# Patient Record
Sex: MALE | Race: WHITE | NOT HISPANIC OR LATINO | Employment: FULL TIME | URBAN - NONMETROPOLITAN AREA
[De-identification: names, ages, dates, MRNs, and addresses within clinical notes are randomized per-mention and may not be internally consistent; named-entity substitution may affect disease eponyms.]

---

## 2017-02-01 DIAGNOSIS — E11.9 TYPE 2 DIABETES MELLITUS WITHOUT COMPLICATION, UNSPECIFIED LONG TERM INSULIN USE STATUS: ICD-10-CM

## 2017-02-01 DIAGNOSIS — I10 BENIGN ESSENTIAL HYPERTENSION: Primary | ICD-10-CM

## 2017-02-02 RX ORDER — LISINOPRIL AND HYDROCHLOROTHIAZIDE 20; 25 MG/1; MG/1
TABLET ORAL
Qty: 60 TABLET | Refills: 0 | Status: SHIPPED | OUTPATIENT
Start: 2017-02-02 | End: 2017-03-21

## 2017-02-02 NOTE — TELEPHONE ENCOUNTER
Metformin 1000mg          Last Written Prescription Date: 07/18/2016  Last Fill Quantity: 180, # refills: 1  Last Office Visit with [facility], [facility] or [facility] prescribing provider:  01/27/2016        BP Readings from Last 3 Encounters:   01/27/16 120/80   01/08/16 138/77   12/18/15 132/82     MICROL       14   9/9/2015  No results found for this basename: microalbumin  CREATININE   Date Value Ref Range Status   01/05/2016 0.62* 0.66 - 1.25 mg/dL Final   ]  GFR ESTIMATE   Date Value Ref Range Status   01/05/2016 >90  Non  GFR Calc   >60 mL/min/1.7m2 Final   09/09/2015 >90  Non  GFR Calc   >60 mL/min/1.7m2 Final   03/24/2014 >90 >60 mL/min/1.7m2 Final     GFR ESTIMATE IF BLACK   Date Value Ref Range Status   01/05/2016 >90   GFR Calc   >60 mL/min/1.7m2 Final   09/09/2015 >90   GFR Calc   >60 mL/min/1.7m2 Final   03/24/2014 >90 >60 mL/min/1.7m2 Final     CHOL      191   9/9/2015  HDL       30   9/9/2015  LDL      111   9/9/2015  LDL      154   3/24/2014  TRIG      249   9/9/2015  CHOLHDLRATIO      6.4   9/9/2015  AST        9   9/9/2015  ALT       18   9/9/2015  A1C      7.4   1/6/2016  A1C      7.6   9/9/2015  A1C      7.7   3/16/2015  A1C      6.6   3/24/2014  A1C      6.8   8/12/2013  POTASSIUM   Date Value Ref Range Status   09/09/2015 3.9 3.4 - 5.3 mmol/L Final         LISINOPRIL-HYDROCHLOROTH 20-25 TABS  Last Written Prescription Date: 07/18/2016  Last Fill Quantity: 180, # refills: 1  Last Office Visit with [facility], [facility] or [facility] prescribing provider: 01/7/2016       POTASSIUM   Date Value Ref Range Status   09/09/2015 3.9 3.4 - 5.3 mmol/L Final     CREATININE   Date Value Ref Range Status   01/05/2016 0.62* 0.66 - 1.25 mg/dL Final     BP Readings from Last 3 Encounters:   01/27/16 120/80   01/08/16 138/77   12/18/15 132/82

## 2017-02-02 NOTE — TELEPHONE ENCOUNTER
Routing refill request to provider for review/approval because:  Labs out of range:  Creatine  Labs not current:  Potassium, creatinine, BP, GFR, LDL, Microalbumin, and A1C  Patient needs to be seen because it has been more than 1 year since last office visit.    Serenity Douglas RN  [facility]

## 2017-02-27 ENCOUNTER — TELEPHONE (OUTPATIENT)
Dept: FAMILY MEDICINE | Facility: OTHER | Age: 57
End: 2017-02-27

## 2017-02-27 DIAGNOSIS — E78.5 HYPERLIPIDEMIA LDL GOAL <100: ICD-10-CM

## 2017-02-27 DIAGNOSIS — E11.9 TYPE 2 DIABETES MELLITUS WITHOUT COMPLICATION, WITHOUT LONG-TERM CURRENT USE OF INSULIN (H): ICD-10-CM

## 2017-02-27 DIAGNOSIS — I10 BENIGN ESSENTIAL HYPERTENSION: Primary | ICD-10-CM

## 2017-02-27 DIAGNOSIS — I10 BENIGN ESSENTIAL HYPERTENSION: ICD-10-CM

## 2017-02-27 LAB
ALBUMIN SERPL-MCNC: 3.8 G/DL (ref 3.4–5)
ALP SERPL-CCNC: 84 U/L (ref 40–150)
ALT SERPL W P-5'-P-CCNC: 21 U/L (ref 0–70)
ANION GAP SERPL CALCULATED.3IONS-SCNC: 8 MMOL/L (ref 3–14)
AST SERPL W P-5'-P-CCNC: 14 U/L (ref 0–45)
BILIRUB SERPL-MCNC: 0.5 MG/DL (ref 0.2–1.3)
BUN SERPL-MCNC: 13 MG/DL (ref 7–30)
CALCIUM SERPL-MCNC: 8.8 MG/DL (ref 8.5–10.1)
CHLORIDE SERPL-SCNC: 102 MMOL/L (ref 94–109)
CO2 SERPL-SCNC: 31 MMOL/L (ref 20–32)
CREAT SERPL-MCNC: 0.75 MG/DL (ref 0.66–1.25)
CREAT UR-MCNC: 121 MG/DL
GFR SERPL CREATININE-BSD FRML MDRD: ABNORMAL ML/MIN/1.7M2
GLUCOSE SERPL-MCNC: 195 MG/DL (ref 70–99)
HBA1C MFR BLD: 9 % (ref 4.3–6)
LDLC SERPL DIRECT ASSAY-MCNC: 178 MG/DL
MICROALBUMIN UR-MCNC: 26 MG/L
MICROALBUMIN/CREAT UR: 21.49 MG/G CR (ref 0–17)
POTASSIUM SERPL-SCNC: 4 MMOL/L (ref 3.4–5.3)
PROT SERPL-MCNC: 7.2 G/DL (ref 6.8–8.8)
SODIUM SERPL-SCNC: 141 MMOL/L (ref 133–144)

## 2017-02-27 PROCEDURE — 83721 ASSAY OF BLOOD LIPOPROTEIN: CPT | Performed by: PHYSICIAN ASSISTANT

## 2017-02-27 PROCEDURE — 80053 COMPREHEN METABOLIC PANEL: CPT | Performed by: PHYSICIAN ASSISTANT

## 2017-02-27 PROCEDURE — 36415 COLL VENOUS BLD VENIPUNCTURE: CPT | Performed by: PHYSICIAN ASSISTANT

## 2017-02-27 PROCEDURE — 83036 HEMOGLOBIN GLYCOSYLATED A1C: CPT | Performed by: PHYSICIAN ASSISTANT

## 2017-02-27 PROCEDURE — 82043 UR ALBUMIN QUANTITATIVE: CPT | Performed by: PHYSICIAN ASSISTANT

## 2017-02-27 NOTE — TELEPHONE ENCOUNTER
Patient stopped in for his yearly blood work but there are no orders.  He says your name was on the letter he received and asks that you place the orders.  Please call when done and he will stop back.  [phone]

## 2017-02-27 NOTE — TELEPHONE ENCOUNTER
"Orders have been placed.  These should have come from Dr ALEJANDRE as I refilled while he was on \"vacation\".  He needs to follow-up with Dr ALEJANDRE soon.  Electronically signed:    Del Moise PA-C   "

## 2017-02-28 ENCOUNTER — TELEPHONE (OUTPATIENT)
Dept: FAMILY MEDICINE | Facility: OTHER | Age: 57
End: 2017-02-28

## 2017-02-28 NOTE — TELEPHONE ENCOUNTER
Patient has already been notified of this and had an appointment. Please disregard.   Kathy Becerra MA     2/28/2017

## 2017-02-28 NOTE — TELEPHONE ENCOUNTER
I left a call back message.  I was informing him of the following: Advise pt labs are not normal,needs office visit to discuss and arange treatment

## 2017-02-28 NOTE — LETTER
[address]   [address]  [phone]      2/28/2017  Fredy Clark   [address] [facility] [address]      Dear Fredy:      Lab results:    The results of your labs were abnormal. Please make an office visit to discuss and arange treatment . Enclosed is a copy of your current labs as well as your previous labs for comparison.  Please feel free to contact the clinic if you have any questions regarding your results.       Recommendations:  Please schedule an appointment with me.         Sincerely,         DOYLE Cannon M.D.  [address]   [address]  Tel. [phone]  Fax [phone]

## 2017-02-28 NOTE — TELEPHONE ENCOUNTER
Pt called back and we scheduled for 3/21 at 9am. If you want to see him sooner, please call him. Also pt would like a copy of his results mailed to him in the mail. Please send that to him.

## 2017-02-28 NOTE — TELEPHONE ENCOUNTER
I have attempted to contact this patient by phone with the following results: left message to return my call on answering machine.  Patient needs to schedule an office visit with Dr. Cannon to discuss lab results. Kathy Becerra MA     2/28/2017    Kathy Becerra MA     2/28/2017

## 2017-02-28 NOTE — TELEPHONE ENCOUNTER
----- Message from Lowell Cannon MD sent at 2/27/2017  4:33 PM CST -----  Advise pt labs are not normal,needs office visit to discuss and arange treatment

## 2017-03-21 ENCOUNTER — OFFICE VISIT (OUTPATIENT)
Dept: FAMILY MEDICINE | Facility: OTHER | Age: 57
End: 2017-03-21
Payer: COMMERCIAL

## 2017-03-21 VITALS
BODY MASS INDEX: 37.52 KG/M2 | RESPIRATION RATE: 20 BRPM | OXYGEN SATURATION: 95 % | DIASTOLIC BLOOD PRESSURE: 80 MMHG | TEMPERATURE: 97.4 F | HEART RATE: 89 BPM | HEIGHT: 72 IN | WEIGHT: 277 LBS | SYSTOLIC BLOOD PRESSURE: 134 MMHG

## 2017-03-21 DIAGNOSIS — E11.9 TYPE 2 DIABETES MELLITUS WITHOUT COMPLICATION, UNSPECIFIED LONG TERM INSULIN USE STATUS: ICD-10-CM

## 2017-03-21 DIAGNOSIS — E11.9 TYPE 2 DIABETES MELLITUS WITHOUT COMPLICATION, WITHOUT LONG-TERM CURRENT USE OF INSULIN (H): ICD-10-CM

## 2017-03-21 DIAGNOSIS — E78.5 HYPERLIPIDEMIA LDL GOAL <100: Primary | ICD-10-CM

## 2017-03-21 PROCEDURE — 99213 OFFICE O/P EST LOW 20 MIN: CPT | Performed by: FAMILY MEDICINE

## 2017-03-21 RX ORDER — GLIPIZIDE 10 MG/1
10 TABLET ORAL
Qty: 90 TABLET | Refills: 1 | Status: SHIPPED | OUTPATIENT
Start: 2017-03-21 | End: 2018-03-14

## 2017-03-21 ASSESSMENT — PAIN SCALES - GENERAL: PAINLEVEL: NO PAIN (0)

## 2017-03-21 NOTE — PROGRESS NOTES
SUBJECTIVE:                                                    Fredy Clark is a 56 year old male who presents to clinic today for the following health issues:      Diabetes Follow-up      Patient is checking blood sugars: not at all    Diabetic concerns: None     Symptoms of hypoglycemia (low blood sugar): none     Paresthesias (numbness or burning in feet) or sores: Yes has sore feet      Date of last diabetic eye exam: none      Hyperlipidemia Follow-Up      Rate your low fat/cholesterol diet?: not monitoring fat    Taking statin?  none    Other lipid medications/supplements?:  Fish oil/Omega 3,  without side effects     Hypertension Follow-up      Outpatient blood pressures are not being checked.    Low Salt Diet: not monitoring salt         Amount of exercise or physical activity: None    Problems taking medications regularly: adjusts on own    Medication side effects: none    Diet: regular (no restrictions)      Problem list and histories reviewed & adjusted, as indicated.    C: NEGATIVE for fever, chills, change in weight  E/M: NEGATIVE for ear, mouth and throat problems  R: NEGATIVE for significant cough or SOB  CV: NEGATIVE for chest pain, palpitations or peripheral edema    OBJECTIVE:                                                    /80 (BP Location: Left arm, Patient Position: Chair, Cuff Size: Adult Large)  Pulse 89  Temp 97.4  F (36.3  C) (Temporal)  Resp 20  Ht 6' (1.829 m)  Wt 277 lb (125.6 kg)  SpO2 95%  BMI 37.57 kg/m2  Body mass index is 37.57 kg/(m^2).    See dictated note     ASSESSMENT/PLAN:                                                        Lowell Cannon MD, MD  [facility]

## 2017-03-21 NOTE — PROGRESS NOTES
SUBJECTIVE:  Mr. Fredy Clark is a 56-year-old man, in to discuss recent labs several of which were abnormal.        He had labs drawn last month; came back with a markedly elevated A1c of 9; the previous one having been 7.4 and prior to that even better.  He has been on metformin 1000 mg twice daily for years.  He is on blood pressure medication and blood pressure is normal.  He does not smoke cigarettes.  He has had some lipid elevations in the past, was started on statins but developed aching on that and was intolerant of it so he has not been taking it.  He has been using some sort of herbal supplement ; he seems convinced that this has helped his diabetes and cholesterol.  I do not see where that would do much.      OBJECTIVE:     GENERAL:  On examination, a very talkative man, hard to keep him on subject.   I think he would really like to be in denial about his diabetes.  He is overweight and a bit florid.   HEART:  Regular.   LUNGS:  Clear.        ASSESSMENT AND PLAN:  He apparently does not have an Accu-Chek machine that is workable.  He has not had any hypoglycemic episodes.  I would not expect him to have on metformin.  We discuss this; I do note that he did not have a thyroid tested on his recent labs and we will draw that today.  His other labs were normal other than an elevated LDL.  He insists on repeating his lipids today and we will do so, although I expect they will remain elevated.  His option would probably be Zetia as he has been intolerant of statins.  I am going to have him seen by a diabetic nurse educator as he needs some education on his diabetes; needs an Accu-Chek machine and monitoring.        He is told I would like to see him back in 3 months.  We will repeat an A1c and lipids at that time.  We will not start the Zetia unless he agrees to it if lipids come back elevated, I do explain that the concern about his diabetes is for vascular damage.         LATONIA DEY M.D.              D: 2017 14:09   T: 2017 17:18   MT: [id number]      Name:     EVELYN CHILDRESS   MRN:      [id number]        Account:      [id number]   :      1960           Visit Date:   2017      Document: [id number]

## 2017-03-21 NOTE — MR AVS SNAPSHOT
After Visit Summary   3/21/2017    Fredy Clark    MRN: [id number]           Patient Information     Date Of Birth          1960        Visit Information        Provider Department      3/21/2017 9:00 AM Lowell Cannon MD [facility]        Today's Diagnoses     Hyperlipidemia LDL goal <100    -  1    Type 2 diabetes mellitus without complication, without long-term current use of insulin (H)        Type 2 diabetes mellitus without complication, unspecified long term insulin use status (H)           Follow-ups after your visit        Additional Services     DIABETES EDUCATOR REFERRAL       DIABETES SELF MANAGEMENT TRAINING (DSMT)      Your provider has referred you to Diabetes Education: [facility]: Diabetes Education - All [facility] [phone]   [email or web address]    Type of training and number of hours: Previous Diagnosis: Follow-up DSMT - 2 hours.    Medicare covers: 10 hours of initial DSMT in 12 month period from the time of first visit, plus 2 hours of follow-up DSMT annually, and additional hours as requested for insulin training.    Diabetes Type: Type 2 - On Oral Medication             Diabetes Co-Morbidities: none               A1C Goal:  <7.0       A1C is: Lab Results       Component                Value               Date                       A1C                      9.0                 02/27/2017              If an urgent visit is needed or A1C is above 12, Care Team to call the Diabetes Education Team at [phone] or send an In Basket message to the Diabetes Education Pool (P DIAB ED-PATIENT CARE).    Diabetes Education Topics: Comprehensive Knowledge Assessment and Instruction, Blood glucose meter instruction  and Medication Start: Oral Medication:     Special Educational Needs Requiring Individual DSMT: None       MEDICAL NUTRITION THERAPY (MNT) for Diabetes    Medical Nutrition Therapy with a  Registered Dietitian can be provided in coordination with Diabetes Self-Management Training to assist in achieving optimal diabetes management.    MNT Type and Hours: Previous diagnosis: Annual follow-up MNT - 2 hours                       Medicare will cover: 3 hours initial MNT in 12 month period after first visit, plus 2 hours of follow-up MNT annually    Please be aware that coverage of these services is subject to the terms and limitations of your health insurance plan.  Call member services at your health plan to determine Diabetes Self-Management Training benefits and ask which blood glucose monitor brands are covered by your plan.      Please bring the following with you to your appointment:    (1)  List of current medications   (2)  List of Blood Glucose Monitor brands that are covered by your insurance plan  (3)  Blood Glucose Monitor and log book  (4)   Food records for the 3 days prior to your visit    The Certified Diabetes Educator may make diabetes medication adjustments per the CDE Protocol and Collaborative Practice Agreement.                  Who to contact     If you have questions or need follow up information about today's clinic visit or your schedule please contact [facility] directly at [phone].  Normal or non-critical lab and imaging results will be communicated to you by Navdyhart, letter or phone within 4 business days after the clinic has received the results. If you do not hear from us within 7 days, please contact the clinic through [employer] or phone. If you have a critical or abnormal lab result, we will notify you by phone as soon as possible.  Submit refill requests through [employer] or call your pharmacy and they will forward the refill request to us. Please allow 3 business days for your refill to be completed.          Additional Information About Your Visit        [employer] Information     [employer] lets you send messages to your doctor, view your test results, renew  "your prescriptions, schedule appointments and more. To sign up, go to [email or web address] . Click on \"Log in\" on the left side of the screen, which will take you to the Welcome page. Then click on \"Sign up Now\" on the right side of the page.     You will be asked to enter the access code listed below, as well as some personal information. Please follow the directions to create your username and password.     Your access code is: [email or web address]  Expires: 2017 11:31 AM     Your access code will  in 90 days. If you need help or a new code, please call your [facility] clinic or [phone].        Care EveryWhere ID     This is your Care EveryWhere ID. This could be used by other organizations to access your [facility] medical records  [id number]        Your Vitals Were     Pulse Temperature Respirations Height Pulse Oximetry BMI (Body Mass Index)    89 97.4  F (36.3  C) (Temporal) 20 6' (1.829 m) 95% 37.57 kg/m2       Blood Pressure from Last 3 Encounters:   17 134/80   16 120/80   16 138/77    Weight from Last 3 Encounters:   17 277 lb (125.6 kg)   16 280 lb (127 kg)   16 274 lb (124.3 kg)              We Performed the Following     DIABETES EDUCATOR REFERRAL     LDL cholesterol direct     TSH with free T4 reflex          Today's Medication Changes          These changes are accurate as of: 3/21/17 11:31 AM.  If you have any questions, ask your nurse or doctor.               Start taking these medicines.        Dose/Directions    glipiZIDE 10 MG tablet   Commonly known as:  GLUCOTROL   Used for:  Type 2 diabetes mellitus without complication, unspecified long term insulin use status (H)   Started by:  Lowell Cannon MD        Dose:  10 mg   Take 1 tablet (10 mg) by mouth 2 times daily (before meals)   Quantity:  90 tablet   Refills:  1         These medicines have changed or have updated prescriptions.        Dose/Directions    metFORMIN 1000 MG tablet   Commonly " known as:  GLUCOPHAGE   This may have changed:  See the new instructions.   Used for:  Type 2 diabetes mellitus without complication, unspecified long term insulin use status (H)   Changed by:  Lowell Cannon MD        Dose:  1000 mg   Take 1 tablet (1,000 mg) by mouth 2 times daily (with meals)   Quantity:  180 tablet   Refills:  3            Where to get your medicines      These medications were sent to [facility] [address] - [place], [place] - [address] [facility]  [address] [facility] [address] [address]     Phone:  [phone]     glipiZIDE 10 MG tablet    metFORMIN 1000 MG tablet                Primary Care Provider Office Phone # Fax #    Lowell Cannon [phone]       [facility] [address] [facility] [address]        Thank you!     Thank you for choosing [facility]  for your care. Our goal is always to provide you with excellent care. Hearing back from our patients is one way we can continue to improve our services. Please take a few minutes to complete the written survey that you may receive in the mail after your visit with us. Thank you!             Your Updated Medication List - Protect others around you: Learn how to safely use, store and throw away your medicines at www.disposemymeds.org.          This list is accurate as of: 3/21/17 11:31 AM.  Always use your most recent med list.                   Brand Name Dispense Instructions for use    ascorbic acid 500 MG tablet    VITAMIN C    60    1 tab daily       aspirin  MG EC tablet      Take 1 tablet (325 mg) by mouth daily       * Cayenne 450 MG Caps      1 tab  bid  with [place]       * Cayenne 500 MG Caps      1 tab twice daily       glipiZIDE 10 MG tablet    GLUCOTROL    90 tablet    Take 1 tablet (10 mg) by mouth 2 times daily (before meals)       lisinopril-hydrochlorothiazide 20-25 MG per tablet    PRINZIDE/ZESTORETIC    180 tablet    Hold medication for the next three days.  On Monday 1/11/16,  begin taking 1 tablet by mouth daily.  On Friday 1/15/16, begin taking your normal 2 tablets by mouth daily.       metFORMIN 1000 MG tablet    GLUCOPHAGE    180 tablet    Take 1 tablet (1,000 mg) by mouth 2 times daily (with meals)       MULTIVITAMIN TABS   OR      1 tab daily       OMEGA-3 CAPS   OR      1200 mg.--1 tab bid       VITAMIN D (CHOLECALCIFEROL) PO      Take 1,000 Units by mouth daily       * Notice:  This list has 2 medication(s) that are the same as other medications prescribed for you. Read the directions carefully, and ask your doctor or other care provider to review them with you.

## 2017-05-14 DIAGNOSIS — I10 BENIGN ESSENTIAL HYPERTENSION: ICD-10-CM

## 2017-05-15 RX ORDER — LISINOPRIL AND HYDROCHLOROTHIAZIDE 20; 25 MG/1; MG/1
TABLET ORAL
Qty: 180 TABLET | Refills: 1 | Status: SHIPPED | OUTPATIENT
Start: 2017-05-15 | End: 2017-12-12

## 2017-05-15 NOTE — TELEPHONE ENCOUNTER
Lisinopril-hydrochlorothiazide (PRINZIDE, ZESTORETIC) 20-25 MG per tablet      Last Written Prescription Date: 01/08/2016  Last Fill Quantity: 180, # refills: 3  Last Office Visit with [facility], P or [facility] prescribing provider: 03/21/2017       Potassium   Date Value Ref Range Status   02/27/2017 4.0 3.4 - 5.3 mmol/L Final     Creatinine   Date Value Ref Range Status   02/27/2017 0.75 0.66 - 1.25 mg/dL Final     BP Readings from Last 3 Encounters:   03/21/17 134/80   01/27/16 120/80   01/08/16 138/77         Jennifer Maguire CMA

## 2017-05-15 NOTE — TELEPHONE ENCOUNTER
Routing refill request to provider for review/approval because:  A break in medication, patient should have been out of medication 4 months ago    Serenity Douglas RN  [facility]

## 2017-11-02 ENCOUNTER — TELEPHONE (OUTPATIENT)
Dept: FAMILY MEDICINE | Facility: OTHER | Age: 57
End: 2017-11-02

## 2017-11-02 NOTE — TELEPHONE ENCOUNTER
Panel Management Review      Patient has the following on his problem list:       Composite cancer screening  Chart review shows that this patient is due/due soon for the following   Summary:    Patient is due/failing the following:   tsh and diabetic office visit. and A1C    Action needed:   Patient needs office visit for diabetes.    Type of outreach:    Phone, left message for patient to call back.     Questions for provider review:    None                                                                                                                                    Cielo PAUL LPN       Chart routed to Cielo PAUL LPN   .

## 2017-11-06 DIAGNOSIS — E11.9 TYPE 2 DIABETES MELLITUS WITHOUT COMPLICATION, WITHOUT LONG-TERM CURRENT USE OF INSULIN (H): ICD-10-CM

## 2017-11-06 DIAGNOSIS — E78.5 HYPERLIPIDEMIA LDL GOAL <100: ICD-10-CM

## 2017-11-06 LAB
LDLC SERPL DIRECT ASSAY-MCNC: 154 MG/DL
TSH SERPL DL<=0.005 MIU/L-ACNC: 1.69 MU/L (ref 0.4–4)

## 2017-11-06 PROCEDURE — 36415 COLL VENOUS BLD VENIPUNCTURE: CPT | Performed by: FAMILY MEDICINE

## 2017-11-06 PROCEDURE — 83721 ASSAY OF BLOOD LIPOPROTEIN: CPT | Performed by: FAMILY MEDICINE

## 2017-11-06 PROCEDURE — 84443 ASSAY THYROID STIM HORMONE: CPT | Performed by: FAMILY MEDICINE

## 2017-11-20 ENCOUNTER — TELEPHONE (OUTPATIENT)
Dept: FAMILY MEDICINE | Facility: OTHER | Age: 57
End: 2017-11-20

## 2017-11-20 NOTE — TELEPHONE ENCOUNTER
"Patient called and is extremely upset that he was called and an A1C was not ordered or drawn at that time states \"well maybe I need to go elsewhere and they will do right and not charge me for a second draw.  Goodbye\"  And hung up on writer will forward to supervisor  "

## 2017-11-20 NOTE — TELEPHONE ENCOUNTER
Reason for Call:  Other regarding labs    Detailed comments: Nabil has questions regarding the lab results that were done on 11-6-17, please call.    Phone Number Patient can be reached at: Cell number on file:    Telephone Information:   Mobile [phone]       Best Time:     Can we leave a detailed message on this number? YES    Call taken on 11/20/2017 at 12:09 PM by María Roldan

## 2017-12-12 DIAGNOSIS — I10 BENIGN ESSENTIAL HYPERTENSION: ICD-10-CM

## 2017-12-12 NOTE — TELEPHONE ENCOUNTER
Requested Prescriptions   Pending Prescriptions Disp Refills     lisinopril-hydrochlorothiazide (PRINZIDE/ZESTORETIC) 20-25 MG per tablet [Pharmacy Med Name: LISINOPRIL-HYDROCHLOROTH 20-25 TABS] 180 tablet 1     Sig: TAKE TWO TABLETS BY MOUTH EVERY DAY    Diuretics (Including Combos) Protocol Passed    12/12/2017  2:52 AM       Passed - Blood pressure under 140/90    BP Readings from Last 3 Encounters:   03/21/17 134/80   01/27/16 120/80   01/08/16 138/77                Passed - Recent or future visit with authorizing provider's specialty    Patient had office visit in the last year or has a visit in the next 30 days with authorizing provider.  See chart review.              Passed - Patient is age 18 or older       Passed - Normal serum creatinine on file in past 12 months    Recent Labs   Lab Test  02/27/17   0937   CR  0.75             Passed - Normal serum potassium on file in past 12 months    Recent Labs   Lab Test  02/27/17   0937   POTASSIUM  4.0                   Passed - Normal serum sodium on file in past 12 months    Recent Labs   Lab Test  02/27/17   0937   NA  141

## 2017-12-13 RX ORDER — LISINOPRIL AND HYDROCHLOROTHIAZIDE 20; 25 MG/1; MG/1
TABLET ORAL
Qty: 180 TABLET | Refills: 1 | Status: SHIPPED | OUTPATIENT
Start: 2017-12-13 | End: 2018-12-17

## 2017-12-13 NOTE — TELEPHONE ENCOUNTER
Prescription approved per [facility] to the [facility] Refill Protocol.    Serenity Douglas RN  [facility]

## 2017-12-18 DIAGNOSIS — E11.9 TYPE 2 DIABETES MELLITUS WITHOUT COMPLICATION, WITHOUT LONG-TERM CURRENT USE OF INSULIN (H): Primary | ICD-10-CM

## 2017-12-18 LAB — HBA1C MFR BLD: 8.3 % (ref 4.3–6)

## 2017-12-18 PROCEDURE — 83036 HEMOGLOBIN GLYCOSYLATED A1C: CPT | Performed by: FAMILY MEDICINE

## 2017-12-18 PROCEDURE — 36415 COLL VENOUS BLD VENIPUNCTURE: CPT | Performed by: FAMILY MEDICINE

## 2018-02-20 ENCOUNTER — TELEPHONE (OUTPATIENT)
Dept: FAMILY MEDICINE | Facility: OTHER | Age: 58
End: 2018-02-20

## 2018-02-20 NOTE — TELEPHONE ENCOUNTER
Panel Management Review      Patient has the following on his problem list:       Composite cancer screening  Chart review shows that this patient is due/due soon for the following   Summary:    Patient is due/failing the following:   Diabetic office visit, labs and A1C    Action needed:   Patient needs office visit for diabetes.    Type of outreach:    Phone, left message for patient to call back.     Questions for provider review:    None                                                                                                                                    Cielo PAUL LPN       Chart routed to Cielo PAUL LPN   .

## 2018-03-14 ENCOUNTER — OFFICE VISIT (OUTPATIENT)
Dept: FAMILY MEDICINE | Facility: OTHER | Age: 58
End: 2018-03-14
Payer: COMMERCIAL

## 2018-03-14 ENCOUNTER — TELEPHONE (OUTPATIENT)
Dept: FAMILY MEDICINE | Facility: OTHER | Age: 58
End: 2018-03-14

## 2018-03-14 ENCOUNTER — RADIANT APPOINTMENT (OUTPATIENT)
Dept: GENERAL RADIOLOGY | Facility: OTHER | Age: 58
End: 2018-03-14
Attending: INTERNAL MEDICINE
Payer: COMMERCIAL

## 2018-03-14 VITALS
DIASTOLIC BLOOD PRESSURE: 70 MMHG | TEMPERATURE: 97.5 F | OXYGEN SATURATION: 91 % | BODY MASS INDEX: 35.13 KG/M2 | WEIGHT: 259 LBS | HEART RATE: 87 BPM | SYSTOLIC BLOOD PRESSURE: 110 MMHG

## 2018-03-14 DIAGNOSIS — H66.003 ACUTE SUPPURATIVE OTITIS MEDIA OF BOTH EARS WITHOUT SPONTANEOUS RUPTURE OF TYMPANIC MEMBRANES, RECURRENCE NOT SPECIFIED: ICD-10-CM

## 2018-03-14 DIAGNOSIS — R06.02 SOB (SHORTNESS OF BREATH): Primary | ICD-10-CM

## 2018-03-14 DIAGNOSIS — I25.10 ASCVD (ARTERIOSCLEROTIC CARDIOVASCULAR DISEASE): ICD-10-CM

## 2018-03-14 DIAGNOSIS — R07.9 ACUTE CHEST PAIN: ICD-10-CM

## 2018-03-14 DIAGNOSIS — E11.9 TYPE 2 DIABETES MELLITUS WITHOUT COMPLICATION, WITHOUT LONG-TERM CURRENT USE OF INSULIN (H): ICD-10-CM

## 2018-03-14 DIAGNOSIS — I10 BENIGN ESSENTIAL HYPERTENSION: ICD-10-CM

## 2018-03-14 DIAGNOSIS — R42 VERTIGO: ICD-10-CM

## 2018-03-14 DIAGNOSIS — R06.02 SOB (SHORTNESS OF BREATH): ICD-10-CM

## 2018-03-14 DIAGNOSIS — E78.5 HYPERLIPIDEMIA LDL GOAL <100: ICD-10-CM

## 2018-03-14 LAB
ALBUMIN SERPL-MCNC: 3.3 G/DL (ref 3.4–5)
ALP SERPL-CCNC: 63 U/L (ref 40–150)
ALT SERPL W P-5'-P-CCNC: 21 U/L (ref 0–70)
ANION GAP SERPL CALCULATED.3IONS-SCNC: 11 MMOL/L (ref 3–14)
AST SERPL W P-5'-P-CCNC: 26 U/L (ref 0–45)
BILIRUB SERPL-MCNC: 0.8 MG/DL (ref 0.2–1.3)
BUN SERPL-MCNC: 17 MG/DL (ref 7–30)
CALCIUM SERPL-MCNC: 8.5 MG/DL (ref 8.5–10.1)
CHLORIDE SERPL-SCNC: 95 MMOL/L (ref 94–109)
CO2 SERPL-SCNC: 28 MMOL/L (ref 20–32)
CREAT SERPL-MCNC: 0.76 MG/DL (ref 0.66–1.25)
ERYTHROCYTE [DISTWIDTH] IN BLOOD BY AUTOMATED COUNT: 12.2 % (ref 10–15)
GFR SERPL CREATININE-BSD FRML MDRD: >90 ML/MIN/1.7M2
GLUCOSE SERPL-MCNC: 168 MG/DL (ref 70–99)
HCT VFR BLD AUTO: 43.7 % (ref 40–53)
HGB BLD-MCNC: 15.5 G/DL (ref 13.3–17.7)
MCH RBC QN AUTO: 31.4 PG (ref 26.5–33)
MCHC RBC AUTO-ENTMCNC: 35.5 G/DL (ref 31.5–36.5)
MCV RBC AUTO: 89 FL (ref 78–100)
PLATELET # BLD AUTO: 196 10E9/L (ref 150–450)
POTASSIUM SERPL-SCNC: 3.7 MMOL/L (ref 3.4–5.3)
PROT SERPL-MCNC: 7.9 G/DL (ref 6.8–8.8)
RBC # BLD AUTO: 4.94 10E12/L (ref 4.4–5.9)
SODIUM SERPL-SCNC: 134 MMOL/L (ref 133–144)
TROPONIN I SERPL-MCNC: <0.015 UG/L (ref 0–0.04)
WBC # BLD AUTO: 6.3 10E9/L (ref 4–11)

## 2018-03-14 PROCEDURE — 71046 X-RAY EXAM CHEST 2 VIEWS: CPT | Mod: FY

## 2018-03-14 PROCEDURE — 36415 COLL VENOUS BLD VENIPUNCTURE: CPT | Performed by: INTERNAL MEDICINE

## 2018-03-14 PROCEDURE — 85027 COMPLETE CBC AUTOMATED: CPT | Performed by: INTERNAL MEDICINE

## 2018-03-14 PROCEDURE — 84484 ASSAY OF TROPONIN QUANT: CPT | Performed by: INTERNAL MEDICINE

## 2018-03-14 PROCEDURE — 80053 COMPREHEN METABOLIC PANEL: CPT | Performed by: INTERNAL MEDICINE

## 2018-03-14 PROCEDURE — 93000 ELECTROCARDIOGRAM COMPLETE: CPT | Performed by: INTERNAL MEDICINE

## 2018-03-14 PROCEDURE — 99214 OFFICE O/P EST MOD 30 MIN: CPT | Performed by: INTERNAL MEDICINE

## 2018-03-14 RX ORDER — MECLIZINE HYDROCHLORIDE 25 MG/1
25 TABLET ORAL EVERY 6 HOURS PRN
Qty: 30 TABLET | Refills: 0 | Status: SHIPPED | OUTPATIENT
Start: 2018-03-14 | End: 2018-12-17

## 2018-03-14 RX ORDER — AZITHROMYCIN 250 MG/1
TABLET, FILM COATED ORAL
Qty: 6 TABLET | Refills: 0 | Status: SHIPPED | OUTPATIENT
Start: 2018-03-14 | End: 2018-12-17

## 2018-03-14 NOTE — TELEPHONE ENCOUNTER
Fredy Clark is a 57 year old male who calls with concerns of dizziness.  Patient reports that symptoms started last Thursday.  Symptoms have slightly improved since that time.  Patient reports dizziness mostly with position changes, but reports it takes a while for dizziness to subside.  He reports some mild cold symptoms, non productive cough and runny nose that has been improving.  He reports a decrease in appetite, but is still getting fluids.  Nauseated but no vomiting.  No bowel or bladder concerns.  He reports some very mild burred vision that comes with the dizziness.  He denies any ringing in the ears or pain.  Denies fevers.  No new medications.  Patient is diabetic, when asked about blood sugars, he was not checking.  Patient is requesting a appointment.     NURSING ASSESSMENT:  Description:  Dizziness.   Onset/duration:  Last Thursday.   Associated symptoms:  Blurred vision, mild cold symptoms.   Improves/worsens symptoms:  Slight improvement.   Pain scale (0-10)   Denies pain.  Last exam/Treatment:  03/21/2017  Allergies:   Allergies   Allergen Reactions     No Known Drug Allergies      NURSING PLAN: Routed to provider Yes    RECOMMENDED DISPOSITION:  See in 24 hours - Patient encouraged to continue with fluids and eating small meals often, slow position changes.  Educated when to seek emergent care but reports the just wants to come to the clinic.   Will comply with recommendation: Unknown.   If further questions/concerns or if symptoms do not improve, worsen or new symptoms develop, call your PCP or [facility] as soon as possible.    Guideline used:  Dizziness.   Telephone Triage Protocols for Nurses, Fifth Edition, Emy Vieyra RN

## 2018-03-14 NOTE — NURSING NOTE
Chief Complaint   Patient presents with     Dizziness       Initial /70 (BP Location: Left arm, Patient Position: Chair, Cuff Size: Adult Regular)  Pulse 87  Temp 97.5  F (36.4  C) (Tympanic)  Wt 259 lb (117.5 kg)  SpO2 91%  BMI 35.13 kg/m2 Estimated body mass index is 35.13 kg/(m^2) as calculated from the following:    Height as of 3/21/17: 6' (1.829 m).    Weight as of this encounter: 259 lb (117.5 kg).  Medication Reconciliation: complete  Jackie SALVADOR

## 2018-03-14 NOTE — MR AVS SNAPSHOT
After Visit Summary   3/14/2018    Fredy Clark    MRN: [id number]           Patient Information     Date Of Birth          1960        Visit Information        Provider Department      3/14/2018 2:00 PM Raymond Sierra DO [facility]        Today's Diagnoses     SOB (shortness of breath)    -  1    ASCVD (arteriosclerotic cardiovascular disease)        Hyperlipidemia LDL goal <100        Type 2 diabetes mellitus without complication, without long-term current use of insulin (H)        Benign essential hypertension        Acute chest pain        Vertigo        Acute suppurative otitis media of both ears without spontaneous rupture of tympanic membranes, recurrence not specified           Follow-ups after your visit        Your next 10 appointments already scheduled     Mar 20, 2018  7:30 AM CDT   (Arrive by 7:15 AM)   NM MPI ADENOSINE with PHNM1, NL STRESS TEST, PMC RM1   [facility] ([facility])    [address] [place] [id number] [phone]           For a ONE day exam: Allow 3-4 hours for test. For a TWO day exam: Allow  minutes PER day for test.  On the day of your resting scan: Please stop eating 3 hours before the test. You may drink water or juice.  On the day of your stress test: Stop all caffeine 12 hours before the test. This includes coffee, tea, soda pop, chocolate and certain medicines (such as Anacin, Excedrin and NoDoz). Also avoid decaf coffee and tea, as these contain small amounts of caffeine.  Stop eating 3 hours before the test. You may drink water.  You may need to stop some medicines before the test. Follow your doctor s orders. - If you take a beta blocker: Do not take your beta-blocker on the day before your test, unless specifically told to by your doctor. And do not take it on the day of your test. Bring it with you to take after the test. - If you take Aggrenox or dipyridamole  "(Persantine, Permole), stop taking it 48 hours before your test. - If you take Viagra, Cialis or Levitra, stop taking it 48 hours before your test. - If you take theophylline or aminophylline, stop taking it 12 hours before your test.  Do not take nitrates on the day of your test. Do not wear your Nitro-Patch.  Please wear a loose two-piece outfit. If you will have an exercise test, bring rubber-soled walking shoes.  When you arrive, please tell us if you have a pacemaker or ICD (implantable defibrillator).  Please call your Imaging Department at your exam site with any questions.              Future tests that were ordered for you today     Open Future Orders        Priority Expected Expires Ordered    NM Exercise stress test Routine  3/14/2019 3/14/2018            Who to contact     If you have questions or need follow up information about today's clinic visit or your schedule please contact [facility] directly at [phone].  Normal or non-critical lab and imaging results will be communicated to you by [email or web address], letter or phone within 4 business days after the clinic has received the results. If you do not hear from us within 7 days, please contact the clinic through [employer] or phone. If you have a critical or abnormal lab result, we will notify you by phone as soon as possible.  Submit refill requests through Exim[employer] or call your pharmacy and they will forward the refill request to us. Please allow 3 business days for your refill to be completed.          Additional Information About Your Visit        Exim[employer] Information     [employer] lets you send messages to your doctor, view your test results, renew your prescriptions, schedule appointments and more. To sign up, go to [email or web address] . Click on \"Log in\" on the left side of the screen, which will take you to the Welcome page. Then click on \"Sign up Now\" on the right side of the page.     You will be asked to enter the access code listed " below, as well as some personal information. Please follow the directions to create your username and password.     Your access code is: [email or web address]  Expires: 2018  2:49 PM     Your access code will  in 90 days. If you need help or a new code, please call your [place] clinic or [phone].        Care EveryWhere ID     This is your Care EveryWhere ID. This could be used by other organizations to access your [place] medical records  [id number]        Your Vitals Were     Pulse Temperature Pulse Oximetry BMI (Body Mass Index)          87 97.5  F (36.4  C) (Tympanic) 91% 35.13 kg/m2         Blood Pressure from Last 3 Encounters:   18 110/70   17 134/80   16 120/80    Weight from Last 3 Encounters:   18 259 lb (117.5 kg)   17 277 lb (125.6 kg)   16 280 lb (127 kg)              We Performed the Following     *UA reflex to Microscopic and Culture ([facility] (except [place] and [place])     CBC with platelets     Comprehensive metabolic panel     EKG 12-lead complete w/read - Clinics     Troponin I          Today's Medication Changes          These changes are accurate as of 3/14/18  2:49 PM.  If you have any questions, ask your nurse or doctor.               Start taking these medicines.        Dose/Directions    azithromycin 250 MG tablet   Commonly known as:  ZITHROMAX   Used for:  Acute suppurative otitis media of both ears without spontaneous rupture of tympanic membranes, recurrence not specified   Started by:  Raymond Sierra DO        Two tablets first day, then one tablet daily for four days.   Quantity:  6 tablet   Refills:  0       meclizine 25 MG tablet   Commonly known as:  ANTIVERT   Used for:  Vertigo   Started by:  Raymond Sierra DO        Dose:  25 mg   Take 1 tablet (25 mg) by mouth every 6 hours as needed for dizziness   Quantity:  30 tablet   Refills:  0            Where to get your medicines      These  medications were sent to [facility] - [place], [place] - [address]  [address] [facility] [address], [place] [address]     Phone:  [phone]     azithromycin 250 MG tablet    meclizine 25 MG tablet                Primary Care Provider Fax #    Physician No Ref-Primary [phone]       No address on file        Equal Access to Services     [facility]: Hadii aad ku hadasho Soomaali, waaxda luqadaha, qaybta kaalmada adeegyada, waxay idiin hayaan adeeg kharash larena . So [facility] [phone].    ATENCIÓN: Si habla español, tiene a ledbetter disposición servicios gratuitos de asistencia lingüística. Mindy al [phone].    We comply with applicable federal civil rights laws and [place] laws. We do not discriminate on the basis of race, color, national origin, age, disability, sex, sexual orientation, or gender identity.            Thank you!     Thank you for choosing [facility]  for your care. Our goal is always to provide you with excellent care. Hearing back from our patients is one way we can continue to improve our services. Please take a few minutes to complete the written survey that you may receive in the mail after your visit with us. Thank you!             Your Updated Medication List - Protect others around you: Learn how to safely use, store and throw away your medicines at www.disposemymeds.org.          This list is accurate as of 3/14/18  2:49 PM.  Always use your most recent med list.                   Brand Name Dispense Instructions for use Diagnosis    ascorbic acid 500 MG tablet    VITAMIN C    60    1 tab daily        aspirin  MG EC tablet      Take 1 tablet (325 mg) by mouth daily        azithromycin 250 MG tablet    ZITHROMAX    6 tablet    Two tablets first day, then one tablet daily for four days.    Acute suppurative otitis media of both ears without spontaneous rupture of tympanic membranes, recurrence not specified       Cayenne 500 MG Caps      1 tab twice daily         lisinopril-hydrochlorothiazide 20-25 MG per tablet    PRINZIDE/ZESTORETIC    180 tablet    TAKE TWO TABLETS BY MOUTH EVERY DAY    Benign essential hypertension       meclizine 25 MG tablet    ANTIVERT    30 tablet    Take 1 tablet (25 mg) by mouth every 6 hours as needed for dizziness    Vertigo       metFORMIN 1000 MG tablet    GLUCOPHAGE    180 tablet    Take 1 tablet (1,000 mg) by mouth 2 times daily (with meals)    Type 2 diabetes mellitus without complication, unspecified long term insulin use status (H)       MULTIVITAMIN TABS   OR      1 tab daily        OMEGA-3 CAPS   OR      1200 mg.--1 tab bid

## 2018-03-14 NOTE — PROGRESS NOTES
"Chief Complaint   Patient presents with     Dizziness     CHIEF COMPLAINT:    The patient is a pleasant 57-year-old gentleman who presents today with \"dizziness\". As were talking, he also notes acute onset of shortness of breath with exertion, pressure-like chest pain intermittently, and a history of a 5 vessel coronary artery bypass graft. Additionally, he has implantable defibrillator in place. We have very little previous records on him with respect to his cardiac status. No recent echocardiogram is available. He is a  notes that he's been having increased difficulty maintaining his normal activities due to shortness of breath. This is not unlike the shortness of breath he had prior to his bypass in 2001. Additionally, he is recently developed some vertigo and unsteadiness on his feet. This is with head motion and position changes. He additionally has a history of type 2 diabetes and his most recent glycosylated hemoglobin was over 8. He does not check his blood sugars at home. He is on metformin and has discontinued his sulfonylurea. He describes the chest pain is intermittent, midsternal, without radiation. He denies any diaphoresis with it but does note that it does seem to get worse with activity. He is not having any at this time.                         PAST, FAMILY,SOCIAL HISTORY:     Medical  History:   has a past medical history of Coronary atherosclerosis of unspecified type of vessel, native or graft; Obesity, Class II, BMI 35-39.9, with comorbidity (10/26/2015); Obesity, unspecified; Other and unspecified hyperlipidemia; Type II or unspecified type diabetes mellitus without mention of complication, not stated as uncontrolled; and Unspecified essential hypertension.     Surgical History:   has a past surgical history that includes CABG, ARTERY-VEIN, FIVE; ANESTH,CARDIOVERTER/DEFIB (01/2001); PPM GENERATOR REMOVAL (06/16/06); and Arthroplasty hip (Left, 1/5/2016).     Social History:   " reports that he quit smoking about 18 years ago. His smoking use included Cigarettes. He has a 40.00 pack-year smoking history. He has quit using smokeless tobacco. He reports that he drinks alcohol. He reports that he does not use illicit drugs.     Family History:  family history is not on file.            MEDICATIONS  Current Outpatient Prescriptions   Medication Sig Dispense Refill     azithromycin (ZITHROMAX) 250 MG tablet Two tablets first day, then one tablet daily for four days. 6 tablet 0     meclizine (ANTIVERT) 25 MG tablet Take 1 tablet (25 mg) by mouth every 6 hours as needed for dizziness 30 tablet 0     lisinopril-hydrochlorothiazide (PRINZIDE/ZESTORETIC) 20-25 MG per tablet TAKE TWO TABLETS BY MOUTH EVERY  tablet 1     metFORMIN (GLUCOPHAGE) 1000 MG tablet Take 1 tablet (1,000 mg) by mouth 2 times daily (with meals) 180 tablet 3     aspirin  MG tablet Take 1 tablet (325 mg) by mouth daily       CAYENNE 500 MG OR CAPS 1 tab twice daily  0     OMEGA-3 CAPS   OR 1200 mg.--1 tab bid  0     VITAMIN C 500 MG OR TABS 1 tab daily 60 0     MULTIVITAMIN TABS   OR 1 tab daily  0     [DISCONTINUED] lisinopril-hydrochlorothiazide (PRINZIDE,ZESTORETIC) 20-25 MG per tablet Hold medication for the next three days.  On Monday 1/11/16, begin taking 1 tablet by mouth daily.  On Friday 1/15/16, begin taking your normal 2 tablets by mouth daily. 180 tablet 3         --------------------------------------------------------------------------------------------------------------------                          REVIEW OF SYSTEMS:         LUNGS: Pt denies: cough,excess sputum, hemoptysis, or shortness of breath at rest with no activity. He does have dyspnea with any exertion. He denies any orthopnea at this time..   HEART: Pt denies: chest pain, arrythmia, syncope, tachy or bradyarrhythmia or excess edema.   GI: Pt denies: nausea, vomitting, diarrhea, constipation, melena, or hematochezia.   NEURO: Pt denies:  seizures, strokes, diplopia, weakness, paraesthesias, or paralysis.   SKIN: Pt denies: itching, rashes, discoloration, or specific lesions of concern. Denies recent hair loss.                          EXAMINATION:         /70 (BP Location: Left arm, Patient Position: Chair, Cuff Size: Adult Regular)  Pulse 87  Temp 97.5  F (36.4  C) (Tympanic)  Wt 259 lb (117.5 kg)  SpO2 91%  BMI 35.13 kg/m2   Constitutional: The patient appears to be in moderate acute distress. The patient appears to be adequately hydrated. No acute respiratory or hemodynamic distress is noted at this time.   LUNGS: Scattered dependent rales are noted. Breath sounds are decreased bilaterally, airflow is brisk, no intercostal retraction or stridor is noted. No coughing is noted during visit.   HEART:  regular without rubs, clicks, gallops, or murmurs. PMI is nondisplaced. Upstrokes are brisk. S1,S2 are heard.   GI: Abdomen is soft, without rebound, guarding or tenderness. Bowel sounds are appropriate. No renal bruits are heard. Abdomen is obese.   NEURO: Pt is alert and appropriate. No neurologic lateralization is noted. Cranial nerves 2-12 are intact. Peripheral sensory and motor function are grossly normal. No significant neuropathy is noted.              ENT: Nasal mucosa is moist, no exudates are seen. No obstruction is present. Pharynx is clear of exudates, no significant cervical adenopathy is present. Tympanic membranes show significant bulging and redness. Significant nystagmus is noted with head motion. The patient is unable to stand with his eyes closed.                            DECISION MAKIN. SOB (shortness of breath)  Will obtain EKG to rule out acute myocardial infarction or arrhythmia./None noted. Evidence of previous inferior and anterior changes are noted as well as right bundle branch block.  - EKG 12-lead complete w/read - Clinics  - CBC with platelets  - XR Chest 2 Views; Future    2. ASCVD  (arteriosclerotic cardiovascular disease)  Will set up for nuclear stress test as it has been many years since she's had follow-up area    3. Hyperlipidemia LDL goal <100      4. Type 2 diabetes mellitus without complication, without long-term current use of insulin (H)  Recent A1c was noted. Will check renal function  - Comprehensive metabolic panel  - *UA reflex to Microscopic and Culture ([place] [facility] (except [place] and [place])    5. Benign essential hypertension  Blood pressure control, continue current medication. Not on beta blocker at this time  6. Acute chest pain  As above  - Troponin I  - NM Exercise stress test; Future    7. Vertigo  Secondary to otitis media. Will place on meclizine as needed  - meclizine (ANTIVERT) 25 MG tablet; Take 1 tablet (25 mg) by mouth every 6 hours as needed for dizziness  Dispense: 30 tablet; Refill: 0    8. Acute suppurative otitis media of both ears without spontaneous rupture of tympanic membranes, recurrence not specified  Tylenol, fluids, rest.  - azithromycin (ZITHROMAX) 250 MG tablet; Two tablets first day, then one tablet daily for four days.  Dispense: 6 tablet; Refill: 0      The patient is obese. Recommend weight loss through responsible dietary restriction and exercise as tolerated.                                 FOLLOW UP    I have asked the patient to make an appointment for follow up with me

## 2018-03-20 ENCOUNTER — HOSPITAL ENCOUNTER (OUTPATIENT)
Dept: NUCLEAR MEDICINE | Facility: CLINIC | Age: 58
Setting detail: NUCLEAR MEDICINE
Discharge: HOME | End: 2018-03-20
Attending: INTERNAL MEDICINE | Admitting: INTERNAL MEDICINE
Payer: COMMERCIAL

## 2018-03-20 DIAGNOSIS — R07.9 ACUTE CHEST PAIN: ICD-10-CM

## 2018-03-20 PROCEDURE — 34300033 ZZH RX 343: Performed by: INTERNAL MEDICINE

## 2018-03-20 PROCEDURE — 78452 HT MUSCLE IMAGE SPECT MULT: CPT | Mod: 26 | Performed by: INTERNAL MEDICINE

## 2018-03-20 PROCEDURE — 25000128 H RX IP 250 OP 636: Performed by: INTERNAL MEDICINE

## 2018-03-20 PROCEDURE — 93017 CV STRESS TEST TRACING ONLY: CPT | Performed by: REHABILITATION PRACTITIONER

## 2018-03-20 PROCEDURE — 78452 HT MUSCLE IMAGE SPECT MULT: CPT

## 2018-03-20 PROCEDURE — A9502 TC99M TETROFOSMIN: HCPCS | Performed by: INTERNAL MEDICINE

## 2018-03-20 RX ORDER — REGADENOSON 0.08 MG/ML
0.4 INJECTION, SOLUTION INTRAVENOUS ONCE
Status: COMPLETED | OUTPATIENT
Start: 2018-03-20 | End: 2018-03-20

## 2018-03-20 RX ADMIN — TETROFOSMIN 37.6 MCI.: 1.38 INJECTION, POWDER, LYOPHILIZED, FOR SOLUTION INTRAVENOUS at 09:35

## 2018-03-20 RX ADMIN — TETROFOSMIN 12.1 MCI.: 1.38 INJECTION, POWDER, LYOPHILIZED, FOR SOLUTION INTRAVENOUS at 07:35

## 2018-03-20 RX ADMIN — REGADENOSON 0.4 MG: 0.08 INJECTION, SOLUTION INTRAVENOUS at 09:24

## 2018-03-20 NOTE — PROGRESS NOTES
Please contact the patient and notify him of the following:  The chemistry panel demonstrates an elevated blood sugar 168.  Kidney tests are normal.  Liver tests shows slight decrease in protein but are otherwise normal.  The heart enzyme is normal.  Cell count is normal with no evidence of leukemia or anemia.    Thank you.   DO BETSEY Tierney

## 2018-03-20 NOTE — PROGRESS NOTES
Please contact the patient and notify him of the following:  No acute lung changes are noted.    Thank you.   DO BETSEY Tierney

## 2018-03-20 NOTE — LETTER
April 5, 2018      Evelyn Clark  [address] [facility] [address]        Dear ,    We are writing to inform you of your test results.    Nuclear stress testing demonstrates a small to moderately sized fixed inferior wall defect consistent with the previous/old infarction.  There is no evidence of new ischemia.   Ejection fraction is less than 35%.  This is about half of what it should be.   Please follow-up with an office visit to discuss further medical recommendations such as  Coreg.     Resulted Orders   NM MPI w Lexiscan    Narrative    GATED MYOCARDIAL PERFUSION SCINTIGRAPHY WITH INTRAVENOUS PHARMACOLOGIC  VASODILATATION LEXISCAN -ONE DAY STUDY     3/20/2018 11:02 AM  EVELYN CLARK  57 years  Male  1960.    Indication/Clinical History: Chest pain and dizziness, history of  coronary artery disease    Impression  1.  Myocardial perfusion imaging using single isotope technique  demonstrated a small to moderately sized mid and basal fixed inferior  wall defect consistent with infarction. There is no evidence of  inducible ischemia.   2. Gated images demonstrated inferior wall akinesis and moderate  global hypokinesis of left ventricle.  The left ventricular systolic  function is moderately reduced with a calculated ejection fraction  31-34%.  There are no previous studies for comparison .    Procedure  Pharmacologic stress testing was performed with Lexiscan at a rate of  0.08 mg/ml rapid bolus injection, for 15 seconds, 0.4 mg/5ml  intravenously. Low-level exercise was not performed along with the  vasodilator infusion.  The heart rate was 82 at baseline and vijay to  108 beats per minute during the Lexiscan infusion. The rest blood  pressure was 155/82 mmHg and was 146/82 mm Hg during Lexiscan  infusion. The patient experienced no symptoms  during the test.    Myocardial perfusion imaging was performed at rest, approximately 45  minutes after the injection intravenously of 12.1 mCi of  Tc-99m  Myoview. At peak pharmacologic effect, 10-20 seconds after Lexiscan,   the patient was injected intravenously with 37.6 mCi of  Tc-99m  Myoview. The post-stress tomographic imaging was performed  approximately 60 minutes after stress.    EKG Findings  The resting EKG demonstrated sinus rhythm with delayed R-wave  progression. There are small Q waves in the inferolateral leads. There  is 0.5 mm of ST downsloping segment depression in the inferior leads.  The stress EKG demonstrated 1 to 1.5 mm of downsloping ST segment in  the inferolateral leads though these changes are less specific due to  the abnormalities at baseline.    Tomographic Findings  Overall, the study quality is adequate . On the stress images, a small  to moderately sized inferior defect mainly affecting the mid and basal  portions of left ventricle is present. There is a severe reduction in  radiotracer uptake. On the rest images, no appreciable changes were  seen to the inferior wall defect . Gated images demonstrated severe  mid and basal inferior wall hypokinesis and moderate global  hypokinesis of left ventricle which also appears mildly enlarged. The  left ventricular ejection fraction was calculated to be 31% post  stress, 34% at rest. TID was absent.    JACOB HORAN MD       If you have any questions or concerns, please call the clinic at the number listed above.       Sincerely,        Raymond Sierra,

## 2018-04-05 NOTE — PROGRESS NOTES
Please contact the patient and notify him of the following:  Nuclear stress testing demonstrates a small to moderately sized fixed inferior wall defect consistent with the previous/old infarction.  There is no evidence of new ischemia.  Ejection fraction is less than 35%.  This is about half of what it should be.  Patient should follow-up to discuss further medical recommendations such as  Coreg.    Thank you.   DO BETSEY Tierney

## 2018-04-08 ENCOUNTER — DOCUMENTATION ONLY (OUTPATIENT)
Dept: FAMILY MEDICINE | Facility: OTHER | Age: 58
End: 2018-04-08

## 2018-04-08 NOTE — PROGRESS NOTES
This gentleman was supposed to return following his cardiac workup. He did not. Could you please get a hold of him and see if he would like to reschedule. His cardiac status is not entirely normal?       Mariela

## 2018-05-21 DIAGNOSIS — E11.9 TYPE 2 DIABETES MELLITUS WITHOUT COMPLICATION, UNSPECIFIED LONG TERM INSULIN USE STATUS: ICD-10-CM

## 2018-05-21 NOTE — TELEPHONE ENCOUNTER
"Requested Prescriptions   Pending Prescriptions Disp Refills     metFORMIN (GLUCOPHAGE) 1000 MG tablet 180 tablet 3    Last Written Prescription Date:  3-21-17  Last Fill Quantity: 180,  # refills: 3   Last office visit: 3/14/2018 with prescribing provider:  3-14-18   Future Office Visit:     Sig: Take 1 tablet (1,000 mg) by mouth 2 times daily (with meals)    Biguanide Agents Failed    5/21/2018 10:32 AM       Failed - Patient has had a Microalbumin in the past 12 mos.    Recent Labs   Lab Test  02/27/17   0949   MICROL  26   UMALCR  21.49*            Failed - Patient has documented A1c within the specified period of time.    If HgbA1C is 8 or greater, it needs to be on file within the past 3 months.  If less than 8, must be on file within the past 6 months.     Recent Labs   Lab Test  12/18/17   1201   A1C  8.3*            Passed - Blood pressure less than 140/90 in past 6 months    BP Readings from Last 3 Encounters:   03/14/18 110/70   03/21/17 134/80   01/27/16 120/80                Passed - Patient has documented LDL within the past 12 mos.    Recent Labs   Lab Test  11/06/17   0822   LDL  154*            Passed - Patient is age 10 or older       Passed - Patient's CR is NOT>1.4 OR Patient's EGFR is NOT<45 within past 12 mos.    Recent Labs   Lab Test  03/14/18   1434   GFRESTIMATED  >90   GFRESTBLACK  >90       Recent Labs   Lab Test  03/14/18   1434   CR  0.76            Passed - Patient does NOT have a diagnosis of CHF.       Passed - Recent (6 mo) or future (30 days) visit within the authorizing provider's specialty    Patient had office visit in the last 6 months or has a visit in the next 30 days with authorizing provider or within the authorizing provider's specialty.  See \"Patient Info\" tab in inbasket, or \"Choose Columns\" in Meds & Orders section of the refill encounter.              "

## 2018-05-22 NOTE — TELEPHONE ENCOUNTER
Routing refill request to provider for review/approval because:  Labs out of range:  Microalbumin, LDL  Labs not current:  Microalbumin, A1C    Serenity Douglas RN  [facility]

## 2018-05-22 NOTE — TELEPHONE ENCOUNTER
Patient will need to come in for microalbumin and A1c for refills after this. I will put in future orders for labs.

## 2018-06-07 ENCOUNTER — TELEPHONE (OUTPATIENT)
Dept: FAMILY MEDICINE | Facility: OTHER | Age: 58
End: 2018-06-07

## 2018-06-07 NOTE — LETTER
[facility]  [address] [facility] [address]  [phone]        Fredy Clark  [address] [facility] [address]      June 7, 2018      Dear Fredy,    I care about your health and have reviewed your health plan, including your medical conditions, medication list, and lab results and am making recommendations based on this review, to better manage your health.    You are in particular need of attention regarding:  -Diabetes    I am recommending that you:  -schedule a FOLLOWUP OFFICE APPOINTMENT with me.  I will recheck your: A1c and Microablumin tests.    If you've had the preventative screening completed at another facility or feel you're not due for this screening, please call our clinic at the number listed above or send us a My Chart message so we can update our records. We would like to thank you in advance for taking the time to take care of your health.  If you have any questions, please don t hesitate to contact our clinic.    Sincerely,       Your [facility] Team

## 2018-06-07 NOTE — TELEPHONE ENCOUNTER
Panel Management Review      Patient has the following on his problem list:     Diabetes    ASA: Passed    Last A1C  Lab Results   Component Value Date    A1C 8.3 12/18/2017    A1C 9.0 02/27/2017    A1C 7.4 01/06/2016    A1C 7.6 09/09/2015    A1C 7.7 03/16/2015     A1C tested: FAILED    Last LDL:    Lab Results   Component Value Date    CHOL 191 09/09/2015     Lab Results   Component Value Date    HDL 30 09/09/2015     Lab Results   Component Value Date     11/06/2017     09/09/2015     Lab Results   Component Value Date    TRIG 249 09/09/2015     Lab Results   Component Value Date    CHOLHDLRATIO 6.4 09/09/2015     No results found for: NHDL    Is the patient on a Statin? NO             Is the patient on Aspirin? YES    Medications     Salicylates    aspirin  MG tablet          Last three blood pressure readings:  BP Readings from Last 3 Encounters:   03/14/18 110/70   03/21/17 134/80   01/27/16 120/80       Date of last diabetes office visit: 3/14/18     Tobacco History:     History   Smoking Status     Former Smoker     Packs/day: 2.00     Years: 20.00     Types: Cigarettes     Quit date: 6/1/1999   Smokeless Tobacco     Former User           Composite cancer screening  Chart review shows that this patient is due/due soon for the following None  Summary:    Patient is due/failing the following:   microalbumin and A1C    Action needed:   Patient needs office visit for diabetes.    Type of outreach:    Sent letter.    Questions for provider review:    None                                                                                                                                    Jackie SALVADOR       Chart routed to Care Team .

## 2018-08-27 DIAGNOSIS — E11.9 TYPE 2 DIABETES MELLITUS WITHOUT COMPLICATION, UNSPECIFIED LONG TERM INSULIN USE STATUS: ICD-10-CM

## 2018-08-27 NOTE — TELEPHONE ENCOUNTER
"Requested Prescriptions   Pending Prescriptions Disp Refills     metFORMIN (GLUCOPHAGE) 1000 MG tablet 180 tablet 0    Last Written Prescription Date:  5-22-18  Last Fill Quantity: 180,  # refills: 0   Last office visit: 3/14/2018 with prescribing provider:  3-   Future Office Visit:     Sig: Take 1 tablet (1,000 mg) by mouth 2 times daily (with meals)    Biguanide Agents Failed    8/27/2018 12:48 PM       Failed - Patient has had a Microalbumin in the past 15 mos.    Recent Labs   Lab Test  02/27/17   0949   MICROL  26   UMALCR  21.49*            Failed - Patient has documented A1c within the specified period of time.    If HgbA1C is 8 or greater, it needs to be on file within the past 3 months.  If less than 8, must be on file within the past 6 months.     Recent Labs   Lab Test  12/18/17   1201   A1C  8.3*            Passed - Blood pressure less than 140/90 in past 6 months    BP Readings from Last 3 Encounters:   03/14/18 110/70   03/21/17 134/80   01/27/16 120/80                Passed - Patient has documented LDL within the past 12 mos.    Recent Labs   Lab Test  11/06/17   0822   LDL  154*            Passed - Patient is age 10 or older       Passed - Patient's CR is NOT>1.4 OR Patient's EGFR is NOT<45 within past 12 mos.    Recent Labs   Lab Test  03/14/18   1434   GFRESTIMATED  >90   GFRESTBLACK  >90       Recent Labs   Lab Test  03/14/18   1434   CR  0.76            Passed - Patient does NOT have a diagnosis of CHF.       Passed - Recent (6 mo) or future (30 days) visit within the authorizing provider's specialty    Patient had office visit in the last 6 months or has a visit in the next 30 days with authorizing provider or within the authorizing provider's specialty.  See \"Patient Info\" tab in inbasket, or \"Choose Columns\" in Meds & Orders section of the refill encounter.              "

## 2018-08-28 NOTE — TELEPHONE ENCOUNTER
Routing refill request to provider for review/approval because:  Labs out of range:  Microalbumin, LDL  Labs not current:  Microalbumin, A1C    DALJIT Avelar, RN  [facility]

## 2018-09-21 ENCOUNTER — TELEPHONE (OUTPATIENT)
Dept: FAMILY MEDICINE | Facility: OTHER | Age: 58
End: 2018-09-21

## 2018-09-21 NOTE — TELEPHONE ENCOUNTER
Panel Management Review      Patient has the following on his problem list:     Diabetes    ASA: Passed    Last A1C  Lab Results   Component Value Date    A1C 8.3 12/18/2017    A1C 9.0 02/27/2017    A1C 7.4 01/06/2016    A1C 7.6 09/09/2015    A1C 7.7 03/16/2015     A1C tested: MONITOR    Last LDL:    Lab Results   Component Value Date    CHOL 191 09/09/2015     Lab Results   Component Value Date    HDL 30 09/09/2015     Lab Results   Component Value Date     11/06/2017     09/09/2015     Lab Results   Component Value Date    TRIG 249 09/09/2015     Lab Results   Component Value Date    CHOLHDLRATIO 6.4 09/09/2015     No results found for: NHDL    Is the patient on a Statin? NO             Is the patient on Aspirin? YES    Medications     Salicylates    aspirin  MG tablet          Last three blood pressure readings:  BP Readings from Last 3 Encounters:   03/14/18 110/70   03/21/17 134/80   01/27/16 120/80       Date of last diabetes office visit: 3/14/18     Tobacco History:     History   Smoking Status     Former Smoker     Packs/day: 2.00     Years: 20.00     Types: Cigarettes     Quit date: 6/1/1999   Smokeless Tobacco     Former User           Composite cancer screening  Chart review shows that this patient is due/due soon for the following Colonoscopy  Summary:    Patient is due/failing the following:   A1C, COLONOSCOPY and FOLLOW UP    Action needed:   Patient needs office visit for diabetes.    Type of outreach:    Sent letter.    Questions for provider review:    None                                                                                                                                    Jackie SALVADOR       Chart routed to Care Team .

## 2018-09-21 NOTE — LETTER
[facility]  [address] [facility] [id number] [phone]        Fredy Clark  [id number] [facility] [address]      September 21, 2018      Dear Fredy,    I care about your health and have reviewed your health plan, including your medical conditions, medication list, and lab results and am making recommendations based on this review, to better manage your health.    You are in particular need of attention regarding:  -Diabetes  -Colon Cancer Screening    I am recommending that you:  -schedule a FOLLOWUP OFFICE APPOINTMENT with me.  I will recheck your: A1c, Lipids (fasting cholesterol - nothing to eat except water and/or meds for 8+ hours), BMP (basic metabolic panel) and Microablumin tests.  -schedule a COLONOSCOPY.  Colon cancer is now the second leading cause of cancer-related deaths in the [place] for both men and women.  There are over 130,000 new cases and 50,000 deaths per year from colon cancer.  A recent study, which included patients ages 55 to 79 found 50,400 [place] deaths from colorectal cancer could have been prevented if patients had undergone a colonoscopy in the previous 10 years.    If you have not had a colonoscopy, we encourage you to schedule by contacting us at [phone], Monday through Friday.  After hours, you may leave a message and we will return your call during normal business hours.      There is another option called a FIT test, if you don t wish to have a colonoscopy, which needs to be repeated every year.  It does replace the colonoscopy for colorectal cancer screening and can detect hidden bleeding in the lower colon.  If a positive result is obtained, you would be referred for a colonoscopy. Please discuss this option with your provider.      For patients under/uninsured, we recommend you contact the [employer] program. [employer] is a free colorectal cancer screening program that provides colonoscopies for eligible  under/uninsured [place] men and women. If you are interested in receiving a free colonoscopy, please call [employer] at [phone] (mention code [employer]) to see if you re eligible.     If you've had the preventative screening completed at another facility or feel you're not due for this screening, please call our clinic at the number listed above or send us a My Chart message so we can update our records. We would like to thank you in advance for taking the time to take care of your health.  If you have any questions, please don t hesitate to contact our clinic.    Sincerely,       Your [facility] Team

## 2018-10-01 DIAGNOSIS — E11.9 TYPE 2 DIABETES MELLITUS WITHOUT COMPLICATION, UNSPECIFIED LONG TERM INSULIN USE STATUS: ICD-10-CM

## 2018-10-01 LAB
CREAT UR-MCNC: 82 MG/DL
HBA1C MFR BLD: 9.2 % (ref 0–5.6)
MICROALBUMIN UR-MCNC: 36 MG/L
MICROALBUMIN/CREAT UR: 43.74 MG/G CR (ref 0–17)

## 2018-10-01 PROCEDURE — 83036 HEMOGLOBIN GLYCOSYLATED A1C: CPT | Performed by: PHYSICIAN ASSISTANT

## 2018-10-01 PROCEDURE — 82043 UR ALBUMIN QUANTITATIVE: CPT | Performed by: PHYSICIAN ASSISTANT

## 2018-10-01 PROCEDURE — 36415 COLL VENOUS BLD VENIPUNCTURE: CPT | Performed by: PHYSICIAN ASSISTANT

## 2018-12-02 DIAGNOSIS — E11.9 TYPE 2 DIABETES MELLITUS WITHOUT COMPLICATION, WITHOUT LONG-TERM CURRENT USE OF INSULIN (H): ICD-10-CM

## 2018-12-04 NOTE — TELEPHONE ENCOUNTER
"Routing to PCP for refill if appropriate.   Routing to schedulers for diabetic follow up with PCP.     Metformin  Last Written Prescription Date:  08/28/2018  Last Fill Quantity: 180,  # refills: 0   Last office visit: 3/14/2018 with prescribing provider:  Mariela   Future Office Visit:  None  Routing refill request to provider for review/approval because:  Patient needs to be seen because:  Per protocol, patient needs to be seen every 6 months.     Requested Prescriptions   Pending Prescriptions Disp Refills     metFORMIN (GLUCOPHAGE) 1000 MG tablet [Pharmacy Med Name: METFORMIN HCL 1000MG TABS] 180 tablet 0     Sig: TAKE ONE TABLET BY MOUTH TWICE A DAY WITH MEALS    Biguanide Agents Failed    12/2/2018  8:46 AM       Failed - Blood pressure less than 140/90 in past 6 months    BP Readings from Last 3 Encounters:   03/14/18 110/70   03/21/17 134/80   01/27/16 120/80          Failed - Patient has documented LDL within the past 12 mos.    Recent Labs   Lab Test  11/06/17   0822   LDL  154*          Failed - Recent (6 mo) or future (30 days) visit within the authorizing provider's specialty    Patient had office visit in the last 6 months or has a visit in the next 30 days with authorizing provider or within the authorizing provider's specialty.  See \"Patient Info\" tab in inbasket, or \"Choose Columns\" in Meds & Orders section of the refill encounter.         Passed - Patient has had a Microalbumin in the past 15 mos.    Recent Labs   Lab Test  10/01/18   0935   MICROL  36   UMALCR  43.74*          Passed - Patient is age 10 or older       Passed - Patient has documented A1c within the specified period of time.    If HgbA1C is 8 or greater, it needs to be on file within the past 3 months.  If less than 8, must be on file within the past 6 months.   Recent Labs   Lab Test  10/01/18   0912   A1C  9.2*          Passed - Patient's CR is NOT>1.4 OR Patient's EGFR is NOT<45 within past 12 mos.    Recent Labs   Lab Test  " 03/14/18   1434   GFRESTIMATED  >90   GFRESTBLACK  >90     Recent Labs   Lab Test  03/14/18   1434   CR  0.76          Passed - Patient does NOT have a diagnosis of CHF.      Adelia Vieyra RN

## 2018-12-17 ENCOUNTER — OFFICE VISIT (OUTPATIENT)
Dept: FAMILY MEDICINE | Facility: OTHER | Age: 58
End: 2018-12-17
Payer: COMMERCIAL

## 2018-12-17 VITALS
HEIGHT: 71 IN | DIASTOLIC BLOOD PRESSURE: 94 MMHG | RESPIRATION RATE: 22 BRPM | TEMPERATURE: 96.4 F | BODY MASS INDEX: 37.94 KG/M2 | HEART RATE: 94 BPM | WEIGHT: 271 LBS | SYSTOLIC BLOOD PRESSURE: 168 MMHG | OXYGEN SATURATION: 96 %

## 2018-12-17 DIAGNOSIS — I25.10 CARDIOVASCULAR DISEASE: ICD-10-CM

## 2018-12-17 DIAGNOSIS — E78.5 HYPERLIPIDEMIA LDL GOAL <100: ICD-10-CM

## 2018-12-17 DIAGNOSIS — Z12.11 SPECIAL SCREENING FOR MALIGNANT NEOPLASMS, COLON: ICD-10-CM

## 2018-12-17 DIAGNOSIS — Z13.89 SCREENING FOR DIABETIC PERIPHERAL NEUROPATHY: ICD-10-CM

## 2018-12-17 DIAGNOSIS — I48.92 ATRIAL FLUTTER, UNSPECIFIED TYPE (H): ICD-10-CM

## 2018-12-17 DIAGNOSIS — Z11.59 NEED FOR HEPATITIS C SCREENING TEST: ICD-10-CM

## 2018-12-17 DIAGNOSIS — I10 BENIGN ESSENTIAL HYPERTENSION: ICD-10-CM

## 2018-12-17 DIAGNOSIS — Z11.4 SCREENING FOR HIV (HUMAN IMMUNODEFICIENCY VIRUS): ICD-10-CM

## 2018-12-17 DIAGNOSIS — R33.8 BENIGN PROSTATIC HYPERPLASIA WITH URINARY RETENTION: ICD-10-CM

## 2018-12-17 DIAGNOSIS — N40.1 BENIGN PROSTATIC HYPERPLASIA WITH URINARY RETENTION: ICD-10-CM

## 2018-12-17 DIAGNOSIS — E11.9 TYPE 2 DIABETES MELLITUS WITHOUT COMPLICATION, WITHOUT LONG-TERM CURRENT USE OF INSULIN (H): Primary | ICD-10-CM

## 2018-12-17 DIAGNOSIS — E66.01 MORBID OBESITY (H): ICD-10-CM

## 2018-12-17 LAB
ALBUMIN SERPL-MCNC: 3.6 G/DL (ref 3.4–5)
ALP SERPL-CCNC: 78 U/L (ref 40–150)
ALT SERPL W P-5'-P-CCNC: 18 U/L (ref 0–70)
ANION GAP SERPL CALCULATED.3IONS-SCNC: 7 MMOL/L (ref 3–14)
AST SERPL W P-5'-P-CCNC: 12 U/L (ref 0–45)
BILIRUB SERPL-MCNC: 0.5 MG/DL (ref 0.2–1.3)
BUN SERPL-MCNC: 13 MG/DL (ref 7–30)
CALCIUM SERPL-MCNC: 8.5 MG/DL (ref 8.5–10.1)
CHLORIDE SERPL-SCNC: 104 MMOL/L (ref 94–109)
CHOLEST SERPL-MCNC: 176 MG/DL
CO2 SERPL-SCNC: 27 MMOL/L (ref 20–32)
CREAT SERPL-MCNC: 0.72 MG/DL (ref 0.66–1.25)
GFR SERPL CREATININE-BSD FRML MDRD: >90 ML/MIN/1.7M2
GLUCOSE SERPL-MCNC: 163 MG/DL (ref 70–99)
HBA1C MFR BLD: 7.6 % (ref 0–5.6)
HCV AB SERPL QL IA: NONREACTIVE
HDLC SERPL-MCNC: 33 MG/DL
HIV 1+2 AB+HIV1 P24 AG SERPL QL IA: NONREACTIVE
LDLC SERPL CALC-MCNC: 119 MG/DL
NONHDLC SERPL-MCNC: 143 MG/DL
POTASSIUM SERPL-SCNC: 4.1 MMOL/L (ref 3.4–5.3)
PROT SERPL-MCNC: 7.8 G/DL (ref 6.8–8.8)
SODIUM SERPL-SCNC: 138 MMOL/L (ref 133–144)
TRIGL SERPL-MCNC: 119 MG/DL

## 2018-12-17 PROCEDURE — 80053 COMPREHEN METABOLIC PANEL: CPT | Performed by: INTERNAL MEDICINE

## 2018-12-17 PROCEDURE — 83036 HEMOGLOBIN GLYCOSYLATED A1C: CPT | Performed by: INTERNAL MEDICINE

## 2018-12-17 PROCEDURE — 86803 HEPATITIS C AB TEST: CPT | Performed by: INTERNAL MEDICINE

## 2018-12-17 PROCEDURE — 99214 OFFICE O/P EST MOD 30 MIN: CPT | Performed by: INTERNAL MEDICINE

## 2018-12-17 PROCEDURE — 80061 LIPID PANEL: CPT | Performed by: INTERNAL MEDICINE

## 2018-12-17 PROCEDURE — 99207 C FOOT EXAM  NO CHARGE: CPT | Performed by: INTERNAL MEDICINE

## 2018-12-17 PROCEDURE — 36415 COLL VENOUS BLD VENIPUNCTURE: CPT | Performed by: INTERNAL MEDICINE

## 2018-12-17 PROCEDURE — 93000 ELECTROCARDIOGRAM COMPLETE: CPT | Performed by: INTERNAL MEDICINE

## 2018-12-17 PROCEDURE — 87389 HIV-1 AG W/HIV-1&-2 AB AG IA: CPT | Performed by: INTERNAL MEDICINE

## 2018-12-17 RX ORDER — LISINOPRIL AND HYDROCHLOROTHIAZIDE 20; 25 MG/1; MG/1
TABLET ORAL
Qty: 180 TABLET | Refills: 3 | Status: SHIPPED | OUTPATIENT
Start: 2018-12-17 | End: 2019-12-30

## 2018-12-17 RX ORDER — DOXAZOSIN 2 MG/1
2 TABLET ORAL AT BEDTIME
Qty: 30 TABLET | Refills: 0 | Status: SHIPPED | OUTPATIENT
Start: 2018-12-17 | End: 2019-01-14

## 2018-12-17 ASSESSMENT — PAIN SCALES - GENERAL: PAINLEVEL: MILD PAIN (2)

## 2018-12-17 ASSESSMENT — MIFFLIN-ST. JEOR: SCORE: 2066.38

## 2018-12-17 NOTE — PROGRESS NOTES
"  SUBJECTIVE:   Fredy Clark is a 58 year old male who presents to clinic today for the following health issues:      Medication Followup     Taking Medication as prescribed: Not taking htn medications    Side Effects:  None    Medication Helping Symptoms:  not applicable     Joint Pain    Onset: several months    Description:   Location: right hip  Character: Sharp    Progression of Symptoms: worse    Accompanying Signs & Symptoms:  Other symptoms: radiation of pain to back and knee    History:   Previous similar pain: YES- had left hip replaced      Precipitating factors:   Trauma or overuse: no     Therapies Tried and outcome: Alieve- helped pain                       Chief Complaint         The patient is a pleasant 58-year-old gentleman who has a history of type 2 diabetes as well as ischemic cardiomyopathy.  He notes he does have a defibrillator in place but it is \"dead\".  He questions whether or not he needs to retain this \"dead\" defibrillator.  Additionally, he is having some cardiac arrhythmia and EKG confirms A. fib flutter pattern.  He does have a controlled rate at this time.  He has not been taking his blood pressure medication because his last blood pressure was good so he stopped it.  He has now been reeducated.  He also complains of hip pain on the right.  This is made somewhat better with over-the-counter naproxen.  He is post total joint arthroplasty on the contralateral side.  He does not check his blood sugars at home.  He is not on a statin because he believes that his cayenne pepper is superior to this.  I find that in [place] it truly is.                         PAST, FAMILY,SOCIAL HISTORY:     Medical  History:   has a past medical history of Coronary atherosclerosis of unspecified type of vessel, native or graft, Obesity, Class II, BMI 35-39.9, with comorbidity (10/26/2015), Obesity, unspecified, Other and unspecified hyperlipidemia, Type II or unspecified type diabetes mellitus without " mention of complication, not stated as uncontrolled, and Unspecified essential hypertension.     Surgical History:   has a past surgical history that includes CABG, ARTERY-VEIN, FIVE; ANESTH,CARDIOVERTER/DEFIB (01/2001); PPM GENERATOR REMOVAL (06/16/06); and Arthroplasty hip (Left, 1/5/2016).     Social History:   reports that he quit smoking about 19 years ago. His smoking use included cigarettes. He has a 40.00 pack-year smoking history. He has quit using smokeless tobacco. He reports that he drinks alcohol. He reports that he does not use drugs.     Family History:  family history is not on file.            MEDICATIONS  Current Outpatient Medications   Medication Sig Dispense Refill     aspirin  MG tablet Take 1 tablet (325 mg) by mouth daily       CAYENNE 500 MG OR CAPS 1 tab twice daily  0     Cholecalciferol (VITAMIN D PO)        doxazosin (CARDURA) 2 MG tablet Take 1 tablet (2 mg) by mouth At Bedtime 30 tablet 0     lisinopril-hydrochlorothiazide (PRINZIDE/ZESTORETIC) 20-25 MG tablet TAKE TWO TABLETS BY MOUTH EVERY  tablet 3     metFORMIN (GLUCOPHAGE) 1000 MG tablet TAKE ONE TABLET BY MOUTH TWICE A DAY WITH MEALS 180 tablet 0     MULTIVITAMIN TABS   OR 1 tab daily  0     OMEGA-3 CAPS   OR 1200 mg.--1 tab bid  0     VITAMIN C 500 MG OR TABS 1 tab daily 60 0         --------------------------------------------------------------------------------------------------------------------                              Review of Systems       LUNGS: Pt denies: cough, excess sputum, hemoptysis, or shortness of breath.   HEART: Pt denies: chest pain, arrhythmia, syncope, tachy or bradyarrhythmia.   GI: Pt denies: nausea, vomiting, diarrhea, constipation, melena, or hematochezia.   NEURO: Pt denies: seizures, strokes, diplopia, weakness, paraesthesias, or paralysis.   SKIN: Pt denies: itching, rashes, discoloration, or specific lesions of concern. Denies recent hair loss.   PSYCH: The patient denies significant  "depression, anxiety, mood imbalance. Specifically denies any suicidal ideation.   URINARY: Pt acknowledges frequency, incomplete voiding, nocturia, and hesitancy.                                       Examination    BP (!) 168/94 (BP Location: Left arm, Patient Position: Sitting, Cuff Size: Adult Large)   Pulse 94   Temp 96.4  F (35.8  C) (Temporal)   Resp 22   Ht 1.803 m (5' 11\")   Wt 122.9 kg (271 lb)   SpO2 96%   BMI 37.80 kg/m         LUNGS: clear bilaterally, airflow is brisk, no intercostal retraction or stridor is noted. No coughing is noted during visit.   HEART: Irregular without rubs, clicks, gallops, or murmurs. PMI is nondisplaced. Upstrokes are brisk. S1,S2 are heard.  2+ lower extremity edema is present.   GI: Abdomen is soft, without rebound, guarding or tenderness. Bowel sounds are appropriate. No renal bruits are heard.   NEURO: Pt is alert and appropriate. No neurologic lateralization is noted. Cranial nerves 2-12 are intact. Peripheral sensory and motor function are grossly normal.    SKIN:  warm and dry. No erythema, or rashes are noted. No specific lesions of concern are noted.    PSYCH: The patient appears grossly appropriate. Maintains good eye contact, does not have any jittery or atypical motion. Displays appropriate affect.   MS: Moderate crepitance is noted in the right hip. No deformity is present. Muscle strength is appropriate and equal bilaterally. No acute joint erythema or swelling is present.   Feet: no evidence of skin breakdown or ulceration is noted.  Sensation is decreased to monofilament and vibration.  Pulses are strong, capillary refill is brisk.                                      Decision Making  1. Type 2 diabetes mellitus without complication, without long-term current use of insulin (H)  Check conference of panel for renal function  - Comprehensive metabolic panel  Check A1c.  2. Benign essential hypertension  Restart medication, check renal function  - " lisinopril-hydrochlorothiazide (PRINZIDE/ZESTORETIC) 20-25 MG tablet; TAKE TWO TABLETS BY MOUTH EVERY DAY  Dispense: 180 tablet; Refill: 3    3. Cardiovascular disease  EKG demonstrates atrial fib/flutter  - EKG 12-lead complete w/read - Clinics    4. Morbid obesity (H)  Recommend weight loss to responsible caloric restriction    5. Benign prostatic hyperplasia with urinary retention  Start doxazosin for prostate obstruction and hypertension but, titrate as tolerated  - doxazosin (CARDURA) 2 MG tablet; Take 1 tablet (2 mg) by mouth At Bedtime  Dispense: 30 tablet; Refill: 0    6. Hyperlipidemia LDL goal <100  Check lipid levels, consider statin therapy once again  - Lipid panel reflex to direct LDL Fasting    7. Special screening for malignant neoplasms, colon  Recommended  - Fecal colorectal cancer screen (FIT); Future    8. Screening for diabetic peripheral neuropathy  Performed  - FOOT EXAM  NO CHARGE [13664.114]    9. Atrial flutter, unspecified type (H)  Set up cardiology consultation regarding arrhythmia as well as defibrillator/pacemaker/myopathy  - CARDIOLOGY EVAL ADULT REFERRAL    10. Screening for HIV (human immunodeficiency virus)    - HIV Screening    11. Need for hepatitis C screening test    - Hepatitis C Screen Reflex to HCV RNA Quant and Genotype                             FOLLOW UP   I have asked the patient to make an appointment for followup with me 3 weeks        I have carefully explained the diagnosis and treatment options to the patient.  The patient has displayed an understanding of the above, and all subsequent questions were answered.      DO BETSEY Tierney    Portions of this note were produced using [employer]ify  Although every attempt at real-time proof reading has been made, occasional grammar/syntax errors may have been missed.

## 2018-12-20 ENCOUNTER — TELEPHONE (OUTPATIENT)
Dept: INTERNAL MEDICINE | Facility: CLINIC | Age: 58
End: 2018-12-20

## 2018-12-20 NOTE — TELEPHONE ENCOUNTER
----- Message from Raymond Sierra DO sent at 12/19/2018  9:23 PM CST -----  Please contact the patient and notify him of the following:  Hemoglobin A1c is improved significantly and is now 7.6 after the previous 9.2.  This would suggest marked improvement in blood sugar management.  The hepatitis C screening test is negative.  The HIV screening test is negative.  The cholesterol is slightly elevated with an LDL of 119.  Would like to see this under 100 if possible.  The chemistry panel shows an elevated blood sugar of 163 with normal kidney and liver tests.  Patient may benefit from the addition of sulfonylurea such as Amaryl.  If he does not see substantial improvement in his blood sugar by the next evaluation, would recommend starting medication.    Thank you.   DO CHAGO TierneyOI

## 2018-12-20 NOTE — RESULT ENCOUNTER NOTE
Please contact the patient and notify him of the following:  Hemoglobin A1c is improved significantly and is now 7.6 after the previous 9.2.  This would suggest marked improvement in blood sugar management.  The hepatitis C screening test is negative.  The HIV screening test is negative.  The cholesterol is slightly elevated with an LDL of 119.  Would like to see this under 100 if possible.  The chemistry panel shows an elevated blood sugar of 163 with normal kidney and liver tests.  Patient may benefit from the addition of sulfonylurea such as Amaryl.  If he does not see substantial improvement in his blood sugar by the next evaluation, would recommend starting medication.    Thank you.   DO BETSEY Tierney

## 2019-01-07 ENCOUNTER — OFFICE VISIT (OUTPATIENT)
Dept: CARDIOLOGY | Facility: CLINIC | Age: 59
End: 2019-01-07
Payer: COMMERCIAL

## 2019-01-07 VITALS
OXYGEN SATURATION: 96 % | BODY MASS INDEX: 37.36 KG/M2 | WEIGHT: 266.9 LBS | DIASTOLIC BLOOD PRESSURE: 78 MMHG | HEIGHT: 71 IN | HEART RATE: 88 BPM | SYSTOLIC BLOOD PRESSURE: 132 MMHG

## 2019-01-07 DIAGNOSIS — I10 BENIGN ESSENTIAL HYPERTENSION: ICD-10-CM

## 2019-01-07 DIAGNOSIS — I48.4 ATYPICAL ATRIAL FLUTTER (H): Primary | ICD-10-CM

## 2019-01-07 PROCEDURE — 99205 OFFICE O/P NEW HI 60 MIN: CPT | Performed by: INTERNAL MEDICINE

## 2019-01-07 ASSESSMENT — MIFFLIN-ST. JEOR: SCORE: 2047.78

## 2019-01-07 NOTE — LETTER
"1/7/2019    Raymond Sierra, DO  [address] [facility] [address]    RE: Fredy Clark       Dear Colleague,    I had the pleasure of seeing Fredy Clark in the [facility].    HISTORY:    Fredy Clark is a pleasant 58-year-old male who is somewhat opinionated and surprising only naïve about cardiology issues.  He has a history of coronary artery disease with 5 vessel bypass surgery done at [facility] in 2001.  He is not certain if he had myocardial infarction in fact asked me what that is, but he had an ICD placed shortly after his surgery.  By his report this was because of a fast rhythm after his surgery.  He does not seem to be aware that he has a cardiomyopathy.  He does have a history of hypertension for which he uses his medications routinely.    Fredy had an ICD placed I believe in 2001 and then he reports that this was replaced about 6 years later.  It has never fired.  He believes that his ICD is dead because 2 or 3 years ago it made beeping sounds which he ignored.    Overall Nabil seems to be doing reasonably well.  He does not notice any real limitations of his exercise capacity and has not had any exertional chest, arm, neck, or jaw discomfort.  He also denies any sense of palpitations, syncope or near syncope, PND/orthopnea, peripheral edema, or claudication.  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    Nabil reports a some vague symptoms but despite careful questioning I really could never tell what his symptoms were.  He states that \"something does not feel right\", and \"sometimes I get a rush\".  This occurs \"once in a while\" including yesterday when he had a \"funny feeling\".  When I asked him to describe details of his complaints he was unable to explain beyond the above quotes.  It does not seem like these symptoms are exertional or associated with palpitations although when I asked him if it involved dizziness or " lightheadedness he thought may be it did.  He could not give further details as to frequency duration etc.    Nabil has had one hip replaced and is considering having the other because of severe pain.  He wanted me to try to answer when this could be done.    Other medical problems include type 2 diabetes and ischemic cardiomyopathy.  His last echocardiogram was done in 2006 and showed an ejection fraction of 35%.  He had a nuclear stress test done here 9 months ago which demonstrated a fixed inferior defect with no inducible ischemia and an ejection fraction at rest of 31%.    Nabil was asked to see me to evaluate a recent arrhythmia that was seen on ECG.  He had this ECG done in anticipation of his possible hip surgery and the ECG demonstrated atrial flutter with controlled ventricular response, new from March of last year.  The patient is completely unaware of this arrhythmia.    In the past Nabil was placed on statins.  He does not recall the names of any of them but does not ever think he was tried on Crestor.  All of them cause severe muscle aches and instead he has been using Cayanne pepper tablets.  He is convinced that this test is good a job as the statins.  He acknowledges that he eats whatever he wants and makes no attempt to follow a healthy diet.      ASSESSMENT/PLAN:    1.  Ischemic cardiomyopathy.  Asymptomatic.  It looks like his ejection fraction has been stable for the last 12 years with an echocardiogram in 2006 and a recent nuclear scan giving roughly identical ejection fraction estimates.  He is currently using lisinopril but not a beta-blocker is quite clear that he like to minimize his medications is not interested in starting multiple medications today.  He openly states that he prefer to be on no medicines at all.  I will arrange an echocardiogram to reevaluate for valvular or structural abnormalities and to reevaluate.  At this point we will not start a beta-blocker in order to keep his  medical regimen simple.  I talked to him extensively about an ICD and stated that I recommended that it be replaced, he wants it removed, and we settled on leaving it alone at this point.  I explained to him that the benefit of replacement would be avoidance of sudden death for ventricular tachycardia, which she is at risk for because of his low ejection fraction.  He seemed to express an understanding of this concept.  2.  Atrial flutter.  Atypical atrial flutter seen on ECG.  This puts him at very high risk of a stroke with a chads vascular score of 4 with points for diabetes, hypertension, CHF, and vascular disease.  I spoke to him extensively about the risks and benefits of anticoagulation and he is willing to consider this.  We talked about various agents.  He is very disinclined to using warfarin because it would require frequent blood tests and he really is not interested in meeting to this.  We settled on using Eliquis.  He understands the potential cost and side effects.  I carefully explained that he should not use aspirin and Eliquis together and should stop aspirin when he starts the Eliquis and restart it if he quits.  3.  Coronary artery disease.  Patient is 18 years out from his surgery, and no ischemia was demonstrated on a recent stress test.  None of his symptoms sound very suspicious for coronary disease although the vague symptoms described above are hard to understand.  He clearly does not have any exertional component to any type of his vague symptoms.  I have suggested initiation of Crestor and we had a very long discussion about this but ultimately he refused to consider it.  He will continue to use his cayenne pepper.  4.  Hypertension.  Adequately controlled on current medications.  He came a long explanation of why his blood pressure was high on his last reading, he had out of medications.  I pointed out that he needs to take responsibility for making sure he gets the prescriptions he is  on filled on a regular basis.    Thank you for inviting me to participate in your patient's care.  Although this was a somewhat frustrating and extremely prolonged conversation, I think we established a rappor today.  He expresses some understanding of his cardiac situation and risks and seems willing toward moving toward making changes to improve those risks.  6-month follow-up will be planned.    Orders Placed This Encounter   Procedures     Follow-Up with Cardiologist     EKG 12-lead complete w/read - Clinics     Echocardiogram Complete     Orders Placed This Encounter   Medications     apixaban ANTICOAGULANT (ELIQUIS) 5 MG tablet     Sig: Take 1 tablet (5 mg) by mouth 2 times daily     Dispense:  60 tablet     Refill:  11     Medications Discontinued During This Encounter   Medication Reason     aspirin  MG tablet        10 year ASCVD risk: The 10-year ASCVD risk score (Marco GOOD Jr., et al., 2013) is: 21.4%    Values used to calculate the score:      Age: 58 years      Sex: Male      Is Non- : No      Diabetic: Yes      Tobacco smoker: No      Systolic Blood Pressure: 132 mmHg      Is BP treated: Yes      HDL Cholesterol: 33 mg/dL      Total Cholesterol: 176 mg/dL    Encounter Diagnoses   Name Primary?     Atypical atrial flutter (H) Yes     Benign essential hypertension        CURRENT MEDICATIONS:  Current Outpatient Medications   Medication Sig Dispense Refill     apixaban ANTICOAGULANT (ELIQUIS) 5 MG tablet Take 1 tablet (5 mg) by mouth 2 times daily 60 tablet 11     CAYENNE 500 MG OR CAPS 1 tab twice daily  0     Cholecalciferol (VITAMIN D PO)        doxazosin (CARDURA) 2 MG tablet Take 1 tablet (2 mg) by mouth At Bedtime 30 tablet 0     lisinopril-hydrochlorothiazide (PRINZIDE/ZESTORETIC) 20-25 MG tablet TAKE TWO TABLETS BY MOUTH EVERY  tablet 3     metFORMIN (GLUCOPHAGE) 1000 MG tablet TAKE ONE TABLET BY MOUTH TWICE A DAY WITH MEALS 180 tablet 0     MULTIVITAMIN TABS    OR 1 tab daily  0     OMEGA-3 CAPS   OR 1200 mg.--1 tab bid  0     VITAMIN C 500 MG OR TABS 1 tab daily 60 0       ALLERGIES     Allergies   Allergen Reactions     No Known Drug Allergies        PAST MEDICAL HISTORY:  Past Medical History:   Diagnosis Date     Coronary atherosclerosis of unspecified type of vessel, native or graft     Coronary artery disease     Obesity, Class II, BMI 35-39.9, with comorbidity 10/26/2015     Obesity, unspecified      Other and unspecified hyperlipidemia      Type II or unspecified type diabetes mellitus without mention of complication, not stated as uncontrolled      Unspecified essential hypertension        PAST SURGICAL HISTORY:  Past Surgical History:   Procedure Laterality Date     ARTHROPLASTY HIP Left 2016    Procedure: ARTHROPLASTY HIP;  Surgeon: Dejon Luis DO;  Location: [facility] OR     C ANESTH,CARDIOVERTER/DEFIB  2001    Implant defibrillator, 2006 - pulse generator change serial # [id number]     C CABG, ARTERY-VEIN, FIVE       HC PPM GENERATOR REMOVAL  06    [facility]- removed Medtronic [id number] replaced with Medtronic Entrust#[id number]       FAMILY HISTORY:  No family history on file.    SOCIAL HISTORY:  Social History     Socioeconomic History     Marital status: Single     Spouse name: Not on file     Number of children: Not on file     Years of education: Not on file     Highest education level: Not on file   Social Needs     Financial resource strain: Not on file     Food insecurity - worry: Not on file     Food insecurity - inability: Not on file     Transportation needs - medical: Not on file     Transportation needs - non-medical: Not on file   Occupational History     Not on file   Tobacco Use     Smoking status: Former Smoker     Packs/day: 2.00     Years: 20.00     Pack years: 40.00     Types: Cigarettes     Last attempt to quit: 1999     Years since quittin.6     Smokeless tobacco: Former User   Substance and Sexual  "Activity     Alcohol use: Yes     Alcohol/week: 0.0 oz     Comment: very little     Drug use: No     Sexual activity: No   Other Topics Concern     Parent/sibling w/ CABG, MI or angioplasty before 65F 55M? Not Asked   Social History Narrative     Not on file       Review of Systems:  Skin:  Negative     Eyes:  Negative    ENT:  Negative    Respiratory:  Negative    Cardiovascular:  Negative for;palpitations;chest pain;edema;lightheadedness;dizziness    Gastroenterology: Negative    Genitourinary:  Negative    Musculoskeletal:  Positive for joint pain  Neurologic:  Negative    Psychiatric:  Negative    Heme/Lymph/Imm:  Negative    Endocrine:  Positive for diabetes    Physical Exam:  Vitals: /78 (BP Location: Right arm, Patient Position: Fowlers, Cuff Size: Adult Large)   Pulse 88   Ht 1.803 m (5' 11\")   Wt 121.1 kg (266 lb 14.4 oz)   SpO2 96%   BMI 37.22 kg/m       Constitutional:  cooperative, alert and oriented, well developed, well nourished, in no acute distress obese      Skin:  warm and dry to the touch        Head:  normocephalic        Eyes:  no xanthalasma        ENT:  no pallor or cyanosis        Neck:  carotid pulses are full and equal bilaterally, JVP normal, no carotid bruit        Chest:  normal breath sounds, clear to auscultation, normal A-P diameter, normal symmetry, normal respiratory excursion, no use of accessory muscles        Cardiac: regular rhythm, normal S1/S2, no S3 or S4, apical impulse not displaced, no murmurs, gallops or rubs                  Abdomen:  abdomen soft;BS normoactive        Vascular: pulses full and equal                                      Extremities and Back:  no edema        Neurological:  no gross motor deficits          Recent Lab Results:  LIPID RESULTS:  Lab Results   Component Value Date    CHOL 176 12/17/2018    HDL 33 (L) 12/17/2018     (H) 12/17/2018    TRIG 119 12/17/2018    CHOLHDLRATIO 6.4 (H) 09/09/2015       LIVER ENZYME RESULTS:  Lab " Results   Component Value Date    AST 12 12/17/2018    ALT 18 12/17/2018       CBC RESULTS:  Lab Results   Component Value Date    WBC 6.3 03/14/2018    RBC 4.94 03/14/2018    HGB 15.5 03/14/2018    HCT 43.7 03/14/2018    MCV 89 03/14/2018    MCH 31.4 03/14/2018    MCHC 35.5 03/14/2018    RDW 12.2 03/14/2018     03/14/2018       BMP RESULTS:  Lab Results   Component Value Date     12/17/2018    POTASSIUM 4.1 12/17/2018    CHLORIDE 104 12/17/2018    CO2 27 12/17/2018    ANIONGAP 7 12/17/2018     (H) 12/17/2018    BUN 13 12/17/2018    CR 0.72 12/17/2018    GFRESTIMATED >90 12/17/2018    GFRESTBLACK >90 12/17/2018    STEPHANIE 8.5 12/17/2018        A1C RESULTS:  Lab Results   Component Value Date    A1C 7.6 (H) 12/17/2018       INR RESULTS:  No results found for: INR      Thank you for allowing me to participate in the care of your patient.    Sincerely,     Ruy Molina MD     [facility]

## 2019-01-07 NOTE — LETTER
"1/7/2019    Raymond Sierra, DO  [address] [facility] [address]    RE: Fredy Clark       Dear Colleague,    I had the pleasure of seeing Fredy Clark in the [facility].    HISTORY:    Fredy Clark is a pleasant 58-year-old male who is somewhat opinionated and surprising only naïve about cardiology issues.  He has a history of coronary artery disease with 5 vessel bypass surgery done at [facility] in 2001.  He is not certain if he had myocardial infarction in fact asked me what that is, but he had an ICD placed shortly after his surgery.  By his report this was because of a fast rhythm after his surgery.  He does not seem to be aware that he has a cardiomyopathy.  He does have a history of hypertension for which he uses his medications routinely.    Fredy had an ICD placed I believe in 2001 and then he reports that this was replaced about 6 years later.  It has never fired.  He believes that his ICD is dead because 2 or 3 years ago it made beeping sounds which he ignored.    Overall Nabil seems to be doing reasonably well.  He does not notice any real limitations of his exercise capacity and has not had any exertional chest, arm, neck, or jaw discomfort.  He also denies any sense of palpitations, syncope or near syncope, PND/orthopnea, peripheral edema, or claudication.  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    Nabil reports a some vague symptoms but despite careful questioning I really could never tell what his symptoms were.  He states that \"something does not feel right\", and \"sometimes I get a rush\".  This occurs \"once in a while\" including yesterday when he had a \"funny feeling\".  When I asked him to describe details of his complaints he was unable to explain beyond the above quotes.  It does not seem like these symptoms are exertional or associated with palpitations although when I asked him if it involved dizziness or " lightheadedness he thought may be it did.  He could not give further details as to frequency duration etc.    Nabil has had one hip replaced and is considering having the other because of severe pain.  He wanted me to try to answer when this could be done.    Other medical problems include type 2 diabetes and ischemic cardiomyopathy.  His last echocardiogram was done in 2006 and showed an ejection fraction of 35%.  He had a nuclear stress test done here 9 months ago which demonstrated a fixed inferior defect with no inducible ischemia and an ejection fraction at rest of 31%.    Nabil was asked to see me to evaluate a recent arrhythmia that was seen on ECG.  He had this ECG done in anticipation of his possible hip surgery and the ECG demonstrated atrial flutter with controlled ventricular response, new from March of last year.  The patient is completely unaware of this arrhythmia.    In the past Nabil was placed on statins.  He does not recall the names of any of them but does not ever think he was tried on Crestor.  All of them cause severe muscle aches and instead he has been using Cayanne pepper tablets.  He is convinced that this test is good a job as the statins.  He acknowledges that he eats whatever he wants and makes no attempt to follow a healthy diet.      ASSESSMENT/PLAN:    1.  Ischemic cardiomyopathy.  Asymptomatic.  It looks like his ejection fraction has been stable for the last 12 years with an echocardiogram in 2006 and a recent nuclear scan giving roughly identical ejection fraction estimates.  He is currently using lisinopril but not a beta-blocker is quite clear that he like to minimize his medications is not interested in starting multiple medications today.  He openly states that he prefer to be on no medicines at all.  I will arrange an echocardiogram to reevaluate for valvular or structural abnormalities and to reevaluate.  At this point we will not start a beta-blocker in order to keep his  medical regimen simple.  I talked to him extensively about an ICD and stated that I recommended that it be replaced, he wants it removed, and we settled on leaving it alone at this point.  I explained to him that the benefit of replacement would be avoidance of sudden death for ventricular tachycardia, which she is at risk for because of his low ejection fraction.  He seemed to express an understanding of this concept.  2.  Atrial flutter.  Atypical atrial flutter seen on ECG.  This puts him at very high risk of a stroke with a chads vascular score of 4 with points for diabetes, hypertension, CHF, and vascular disease.  I spoke to him extensively about the risks and benefits of anticoagulation and he is willing to consider this.  We talked about various agents.  He is very disinclined to using warfarin because it would require frequent blood tests and he really is not interested in meeting to this.  We settled on using Eliquis.  He understands the potential cost and side effects.  I carefully explained that he should not use aspirin and Eliquis together and should stop aspirin when he starts the Eliquis and restart it if he quits.  3.  Coronary artery disease.  Patient is 18 years out from his surgery, and no ischemia was demonstrated on a recent stress test.  None of his symptoms sound very suspicious for coronary disease although the vague symptoms described above are hard to understand.  He clearly does not have any exertional component to any type of his vague symptoms.  I have suggested initiation of Crestor and we had a very long discussion about this but ultimately he refused to consider it.  He will continue to use his cayenne pepper.  4.  Hypertension.  Adequately controlled on current medications.  He came a long explanation of why his blood pressure was high on his last reading, he had out of medications.  I pointed out that he needs to take responsibility for making sure he gets the prescriptions he is  on filled on a regular basis.    Thank you for inviting me to participate in your patient's care.  Although this was a somewhat frustrating and extremely prolonged conversation, I think we established a rappor today.  He expresses some understanding of his cardiac situation and risks and seems willing toward moving toward making changes to improve those risks.  6-month follow-up will be planned.    Orders Placed This Encounter   Procedures     Follow-Up with Cardiologist     EKG 12-lead complete w/read - Clinics     Echocardiogram Complete     Orders Placed This Encounter   Medications     apixaban ANTICOAGULANT (ELIQUIS) 5 MG tablet     Sig: Take 1 tablet (5 mg) by mouth 2 times daily     Dispense:  60 tablet     Refill:  11     Medications Discontinued During This Encounter   Medication Reason     aspirin  MG tablet        10 year ASCVD risk: The 10-year ASCVD risk score (Marco GOOD Jr., et al., 2013) is: 21.4%    Values used to calculate the score:      Age: 58 years      Sex: Male      Is Non- : No      Diabetic: Yes      Tobacco smoker: No      Systolic Blood Pressure: 132 mmHg      Is BP treated: Yes      HDL Cholesterol: 33 mg/dL      Total Cholesterol: 176 mg/dL    Encounter Diagnoses   Name Primary?     Atypical atrial flutter (H) Yes     Benign essential hypertension        CURRENT MEDICATIONS:  Current Outpatient Medications   Medication Sig Dispense Refill     apixaban ANTICOAGULANT (ELIQUIS) 5 MG tablet Take 1 tablet (5 mg) by mouth 2 times daily 60 tablet 11     CAYENNE 500 MG OR CAPS 1 tab twice daily  0     Cholecalciferol (VITAMIN D PO)        doxazosin (CARDURA) 2 MG tablet Take 1 tablet (2 mg) by mouth At Bedtime 30 tablet 0     lisinopril-hydrochlorothiazide (PRINZIDE/ZESTORETIC) 20-25 MG tablet TAKE TWO TABLETS BY MOUTH EVERY  tablet 3     metFORMIN (GLUCOPHAGE) 1000 MG tablet TAKE ONE TABLET BY MOUTH TWICE A DAY WITH MEALS 180 tablet 0     MULTIVITAMIN TABS    OR 1 tab daily  0     OMEGA-3 CAPS   OR 1200 mg.--1 tab bid  0     VITAMIN C 500 MG OR TABS 1 tab daily 60 0       ALLERGIES     Allergies   Allergen Reactions     No Known Drug Allergies        PAST MEDICAL HISTORY:  Past Medical History:   Diagnosis Date     Coronary atherosclerosis of unspecified type of vessel, native or graft     Coronary artery disease     Obesity, Class II, BMI 35-39.9, with comorbidity 10/26/2015     Obesity, unspecified      Other and unspecified hyperlipidemia      Type II or unspecified type diabetes mellitus without mention of complication, not stated as uncontrolled      Unspecified essential hypertension        PAST SURGICAL HISTORY:  Past Surgical History:   Procedure Laterality Date     ARTHROPLASTY HIP Left 2016    Procedure: ARTHROPLASTY HIP;  Surgeon: Dejon Lusi DO;  Location: [facility] OR     C ANESTH,CARDIOVERTER/DEFIB  2001    Implant defibrillator, 2006 - pulse generator change serial # [id number]     C CABG, ARTERY-VEIN, FIVE       HC PPM GENERATOR REMOVAL  06    [facility]- removed Medtronic [id number] replaced with Medtronic Entrust#[id number]       FAMILY HISTORY:  No family history on file.    SOCIAL HISTORY:  Social History     Socioeconomic History     Marital status: Single     Spouse name: Not on file     Number of children: Not on file     Years of education: Not on file     Highest education level: Not on file   Social Needs     Financial resource strain: Not on file     Food insecurity - worry: Not on file     Food insecurity - inability: Not on file     Transportation needs - medical: Not on file     Transportation needs - non-medical: Not on file   Occupational History     Not on file   Tobacco Use     Smoking status: Former Smoker     Packs/day: 2.00     Years: 20.00     Pack years: 40.00     Types: Cigarettes     Last attempt to quit: 1999     Years since quittin.6     Smokeless tobacco: Former User   Substance and Sexual  "Activity     Alcohol use: Yes     Alcohol/week: 0.0 oz     Comment: very little     Drug use: No     Sexual activity: No   Other Topics Concern     Parent/sibling w/ CABG, MI or angioplasty before 65F 55M? Not Asked   Social History Narrative     Not on file       Review of Systems:  Skin:  Negative     Eyes:  Negative    ENT:  Negative    Respiratory:  Negative    Cardiovascular:  Negative for;palpitations;chest pain;edema;lightheadedness;dizziness    Gastroenterology: Negative    Genitourinary:  Negative    Musculoskeletal:  Positive for joint pain  Neurologic:  Negative    Psychiatric:  Negative    Heme/Lymph/Imm:  Negative    Endocrine:  Positive for diabetes    Physical Exam:  Vitals: /78 (BP Location: Right arm, Patient Position: Fowlers, Cuff Size: Adult Large)   Pulse 88   Ht 1.803 m (5' 11\")   Wt 121.1 kg (266 lb 14.4 oz)   SpO2 96%   BMI 37.22 kg/m       Constitutional:  cooperative, alert and oriented, well developed, well nourished, in no acute distress obese      Skin:  warm and dry to the touch        Head:  normocephalic        Eyes:  no xanthalasma        ENT:  no pallor or cyanosis        Neck:  carotid pulses are full and equal bilaterally, JVP normal, no carotid bruit        Chest:  normal breath sounds, clear to auscultation, normal A-P diameter, normal symmetry, normal respiratory excursion, no use of accessory muscles        Cardiac: regular rhythm, normal S1/S2, no S3 or S4, apical impulse not displaced, no murmurs, gallops or rubs                  Abdomen:  abdomen soft;BS normoactive        Vascular: pulses full and equal                                      Extremities and Back:  no edema        Neurological:  no gross motor deficits          Recent Lab Results:  LIPID RESULTS:  Lab Results   Component Value Date    CHOL 176 12/17/2018    HDL 33 (L) 12/17/2018     (H) 12/17/2018    TRIG 119 12/17/2018    CHOLHDLRATIO 6.4 (H) 09/09/2015       LIVER ENZYME RESULTS:  Lab " Results   Component Value Date    AST 12 12/17/2018    ALT 18 12/17/2018       CBC RESULTS:  Lab Results   Component Value Date    WBC 6.3 03/14/2018    RBC 4.94 03/14/2018    HGB 15.5 03/14/2018    HCT 43.7 03/14/2018    MCV 89 03/14/2018    MCH 31.4 03/14/2018    MCHC 35.5 03/14/2018    RDW 12.2 03/14/2018     03/14/2018       BMP RESULTS:  Lab Results   Component Value Date     12/17/2018    POTASSIUM 4.1 12/17/2018    CHLORIDE 104 12/17/2018    CO2 27 12/17/2018    ANIONGAP 7 12/17/2018     (H) 12/17/2018    BUN 13 12/17/2018    CR 0.72 12/17/2018    GFRESTIMATED >90 12/17/2018    GFRESTBLACK >90 12/17/2018    STEPHANIE 8.5 12/17/2018        A1C RESULTS:  Lab Results   Component Value Date    A1C 7.6 (H) 12/17/2018       INR RESULTS:  No results found for: INR      Ruy Molina MD, FAC    CC  Raymond Sierra, DO  [address] [place] [phone]                    Thank you for allowing me to participate in the care of your patient.      Sincerely,     Ruy Molina MD     [facility]    cc:   Raymond Sierra,   [address] [place] [phone]

## 2019-01-07 NOTE — PROGRESS NOTES
"HISTORY:    Fredy Clark is a pleasant 58-year-old male who is somewhat opinionated and surprising only naïve about cardiology issues.  He has a history of coronary artery disease with 5 vessel bypass surgery done at [facility] in 2001.  He is not certain if he had myocardial infarction in fact asked me what that is, but he had an ICD placed shortly after his surgery.  By his report this was because of a fast rhythm after his surgery.  He does not seem to be aware that he has a cardiomyopathy.  He does have a history of hypertension for which he uses his medications routinely.    Fredy had an ICD placed I believe in 2001 and then he reports that this was replaced about 6 years later.  It has never fired.  He believes that his ICD is dead because 2 or 3 years ago it made beeping sounds which he ignored.    Overall Nabil seems to be doing reasonably well.  He does not notice any real limitations of his exercise capacity and has not had any exertional chest, arm, neck, or jaw discomfort.  He also denies any sense of palpitations, syncope or near syncope, PND/orthopnea, peripheral edema, or claudication.  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    Nabil reports a some vague symptoms but despite careful questioning I really could never tell what his symptoms were.  He states that \"something does not feel right\", and \"sometimes I get a rush\".  This occurs \"once in a while\" including yesterday when he had a \"funny feeling\".  When I asked him to describe details of his complaints he was unable to explain beyond the above quotes.  It does not seem like these symptoms are exertional or associated with palpitations although when I asked him if it involved dizziness or lightheadedness he thought may be it did.  He could not give further details as to frequency duration etc.    Nabil has had one hip replaced and is considering having the other because of severe pain.  He wanted me to try to answer when " this could be done.    Other medical problems include type 2 diabetes and ischemic cardiomyopathy.  His last echocardiogram was done in 2006 and showed an ejection fraction of 35%.  He had a nuclear stress test done here 9 months ago which demonstrated a fixed inferior defect with no inducible ischemia and an ejection fraction at rest of 31%.    Nabil was asked to see me to evaluate a recent arrhythmia that was seen on ECG.  He had this ECG done in anticipation of his possible hip surgery and the ECG demonstrated atrial flutter with controlled ventricular response, new from March of last year.  The patient is completely unaware of this arrhythmia.    In the past Nabil was placed on statins.  He does not recall the names of any of them but does not ever think he was tried on Crestor.  All of them cause severe muscle aches and instead he has been using Cayanne pepper tablets.  He is convinced that this test is good a job as the statins.  He acknowledges that he eats whatever he wants and makes no attempt to follow a healthy diet.      ASSESSMENT/PLAN:    1.  Ischemic cardiomyopathy.  Asymptomatic.  It looks like his ejection fraction has been stable for the last 12 years with an echocardiogram in 2006 and a recent nuclear scan giving roughly identical ejection fraction estimates.  He is currently using lisinopril but not a beta-blocker is quite clear that he like to minimize his medications is not interested in starting multiple medications today.  He openly states that he prefer to be on no medicines at all.  I will arrange an echocardiogram to reevaluate for valvular or structural abnormalities and to reevaluate.  At this point we will not start a beta-blocker in order to keep his medical regimen simple.  I talked to him extensively about an ICD and stated that I recommended that it be replaced, he wants it removed, and we settled on leaving it alone at this point.  I explained to him that the benefit of replacement  would be avoidance of sudden death for ventricular tachycardia, which she is at risk for because of his low ejection fraction.  He seemed to express an understanding of this concept.  2.  Atrial flutter.  Atypical atrial flutter seen on ECG.  This puts him at very high risk of a stroke with a chads vascular score of 4 with points for diabetes, hypertension, CHF, and vascular disease.  I spoke to him extensively about the risks and benefits of anticoagulation and he is willing to consider this.  We talked about various agents.  He is very disinclined to using warfarin because it would require frequent blood tests and he really is not interested in meeting to this.  We settled on using Eliquis.  He understands the potential cost and side effects.  I carefully explained that he should not use aspirin and Eliquis together and should stop aspirin when he starts the Eliquis and restart it if he quits.  3.  Coronary artery disease.  Patient is 18 years out from his surgery, and no ischemia was demonstrated on a recent stress test.  None of his symptoms sound very suspicious for coronary disease although the vague symptoms described above are hard to understand.  He clearly does not have any exertional component to any type of his vague symptoms.  I have suggested initiation of Crestor and we had a very long discussion about this but ultimately he refused to consider it.  He will continue to use his cayenne pepper.  4.  Hypertension.  Adequately controlled on current medications.  He came a long explanation of why his blood pressure was high on his last reading, he had out of medications.  I pointed out that he needs to take responsibility for making sure he gets the prescriptions he is on filled on a regular basis.    Thank you for inviting me to participate in your patient's care.  Although this was a somewhat frustrating and extremely prolonged conversation, I think we established a rappor today.  He expresses some  understanding of his cardiac situation and risks and seems willing toward moving toward making changes to improve those risks.  6-month follow-up will be planned.    Orders Placed This Encounter   Procedures     Follow-Up with Cardiologist     EKG 12-lead complete w/read - Clinics     Echocardiogram Complete     Orders Placed This Encounter   Medications     apixaban ANTICOAGULANT (ELIQUIS) 5 MG tablet     Sig: Take 1 tablet (5 mg) by mouth 2 times daily     Dispense:  60 tablet     Refill:  11     Medications Discontinued During This Encounter   Medication Reason     aspirin  MG tablet        10 year ASCVD risk: The 10-year ASCVD risk score ([place] GOOD Jr., et al., 2013) is: 21.4%    Values used to calculate the score:      Age: 58 years      Sex: Male      Is Non- : No      Diabetic: Yes      Tobacco smoker: No      Systolic Blood Pressure: 132 mmHg      Is BP treated: Yes      HDL Cholesterol: 33 mg/dL      Total Cholesterol: 176 mg/dL    Encounter Diagnoses   Name Primary?     Atypical atrial flutter (H) Yes     Benign essential hypertension        CURRENT MEDICATIONS:  Current Outpatient Medications   Medication Sig Dispense Refill     apixaban ANTICOAGULANT (ELIQUIS) 5 MG tablet Take 1 tablet (5 mg) by mouth 2 times daily 60 tablet 11     CAYENNE 500 MG OR CAPS 1 tab twice daily  0     Cholecalciferol (VITAMIN D PO)        doxazosin (CARDURA) 2 MG tablet Take 1 tablet (2 mg) by mouth At Bedtime 30 tablet 0     lisinopril-hydrochlorothiazide (PRINZIDE/ZESTORETIC) 20-25 MG tablet TAKE TWO TABLETS BY MOUTH EVERY  tablet 3     metFORMIN (GLUCOPHAGE) 1000 MG tablet TAKE ONE TABLET BY MOUTH TWICE A DAY WITH MEALS 180 tablet 0     MULTIVITAMIN TABS   OR 1 tab daily  0     OMEGA-3 CAPS   OR 1200 mg.--1 tab bid  0     VITAMIN C 500 MG OR TABS 1 tab daily 60 0       ALLERGIES     Allergies   Allergen Reactions     No Known Drug Allergies        PAST MEDICAL HISTORY:  Past Medical  History:   Diagnosis Date     Coronary atherosclerosis of unspecified type of vessel, native or graft     Coronary artery disease     Obesity, Class II, BMI 35-39.9, with comorbidity 10/26/2015     Obesity, unspecified      Other and unspecified hyperlipidemia      Type II or unspecified type diabetes mellitus without mention of complication, not stated as uncontrolled      Unspecified essential hypertension        PAST SURGICAL HISTORY:  Past Surgical History:   Procedure Laterality Date     ARTHROPLASTY HIP Left 2016    Procedure: ARTHROPLASTY HIP;  Surgeon: Dejon Luis DO;  Location: [facility] OR     C ANESTH,CARDIOVERTER/DEFIB  2001    Implant defibrillator, 2006 - pulse generator change serial # [id number]     C CABG, ARTERY-VEIN, FIVE       HC PPM GENERATOR REMOVAL  06    [facility]- removed Medtronic [id number] replaced with Medtronic Entrust#[id number]       FAMILY HISTORY:  No family history on file.    SOCIAL HISTORY:  Social History     Socioeconomic History     Marital status: Single     Spouse name: Not on file     Number of children: Not on file     Years of education: Not on file     Highest education level: Not on file   Social Needs     Financial resource strain: Not on file     Food insecurity - worry: Not on file     Food insecurity - inability: Not on file     Transportation needs - medical: Not on file     Transportation needs - non-medical: Not on file   Occupational History     Not on file   Tobacco Use     Smoking status: Former Smoker     Packs/day: 2.00     Years: 20.00     Pack years: 40.00     Types: Cigarettes     Last attempt to quit: 1999     Years since quittin.6     Smokeless tobacco: Former User   Substance and Sexual Activity     Alcohol use: Yes     Alcohol/week: 0.0 oz     Comment: very little     Drug use: No     Sexual activity: No   Other Topics Concern     Parent/sibling w/ CABG, MI or angioplasty before 65F 55M? Not Asked   Social History  "Narrative     Not on file       Review of Systems:  Skin:  Negative     Eyes:  Negative    ENT:  Negative    Respiratory:  Negative    Cardiovascular:  Negative for;palpitations;chest pain;edema;lightheadedness;dizziness    Gastroenterology: Negative    Genitourinary:  Negative    Musculoskeletal:  Positive for joint pain  Neurologic:  Negative    Psychiatric:  Negative    Heme/Lymph/Imm:  Negative    Endocrine:  Positive for diabetes    Physical Exam:  Vitals: /78 (BP Location: Right arm, Patient Position: Fowlers, Cuff Size: Adult Large)   Pulse 88   Ht 1.803 m (5' 11\")   Wt 121.1 kg (266 lb 14.4 oz)   SpO2 96%   BMI 37.22 kg/m      Constitutional:  cooperative, alert and oriented, well developed, well nourished, in no acute distress obese      Skin:  warm and dry to the touch        Head:  normocephalic        Eyes:  no xanthalasma        ENT:  no pallor or cyanosis        Neck:  carotid pulses are full and equal bilaterally, JVP normal, no carotid bruit        Chest:  normal breath sounds, clear to auscultation, normal A-P diameter, normal symmetry, normal respiratory excursion, no use of accessory muscles        Cardiac: regular rhythm, normal S1/S2, no S3 or S4, apical impulse not displaced, no murmurs, gallops or rubs                  Abdomen:  abdomen soft;BS normoactive        Vascular: pulses full and equal                                      Extremities and Back:  no edema        Neurological:  no gross motor deficits          Recent Lab Results:  LIPID RESULTS:  Lab Results   Component Value Date    CHOL 176 12/17/2018    HDL 33 (L) 12/17/2018     (H) 12/17/2018    TRIG 119 12/17/2018    CHOLHDLRATIO 6.4 (H) 09/09/2015       LIVER ENZYME RESULTS:  Lab Results   Component Value Date    AST 12 12/17/2018    ALT 18 12/17/2018       CBC RESULTS:  Lab Results   Component Value Date    WBC 6.3 03/14/2018    RBC 4.94 03/14/2018    HGB 15.5 03/14/2018    HCT 43.7 03/14/2018    MCV 89 " 03/14/2018    MCH 31.4 03/14/2018    MCHC 35.5 03/14/2018    RDW 12.2 03/14/2018     03/14/2018       BMP RESULTS:  Lab Results   Component Value Date     12/17/2018    POTASSIUM 4.1 12/17/2018    CHLORIDE 104 12/17/2018    CO2 27 12/17/2018    ANIONGAP 7 12/17/2018     (H) 12/17/2018    BUN 13 12/17/2018    CR 0.72 12/17/2018    GFRESTIMATED >90 12/17/2018    GFRESTBLACK >90 12/17/2018    STEPHANIE 8.5 12/17/2018        A1C RESULTS:  Lab Results   Component Value Date    A1C 7.6 (H) 12/17/2018       INR RESULTS:  No results found for: INR      Ruy Molina MD, FACC    CC  Raymond Ruy Sierra, DO  [address] [place] [address]

## 2019-01-14 DIAGNOSIS — R33.8 BENIGN PROSTATIC HYPERPLASIA WITH URINARY RETENTION: ICD-10-CM

## 2019-01-14 DIAGNOSIS — N40.1 BENIGN PROSTATIC HYPERPLASIA WITH URINARY RETENTION: ICD-10-CM

## 2019-01-16 RX ORDER — DOXAZOSIN 2 MG/1
TABLET ORAL
Qty: 30 TABLET | Refills: 0 | Status: SHIPPED | OUTPATIENT
Start: 2019-01-16 | End: 2019-12-23

## 2019-01-16 NOTE — TELEPHONE ENCOUNTER
Prescription approved per [facility] Refill Protocol.    JESIKA AvelarN, RN  [facility]

## 2019-01-16 NOTE — TELEPHONE ENCOUNTER
"Requested Prescriptions   Pending Prescriptions Disp Refills     doxazosin (CARDURA) 2 MG tablet [Pharmacy Med Name: DOXAZOSIN MESYLATE 2MG TABS] 30 tablet 0    Last Written Prescription Date:  12/17/18  Last Fill Quantity: 30,  # refills: 0   Last office visit: 12/17/2018 with prescribing provider:  12/17/18   Future Office Visit:     Sig: TAKE ONE TABLET BY MOUTH AT BEDTIME    Alpha Blockers Passed - 1/14/2019  5:59 AM       Passed - Blood pressure under 140/90 in past 12 months    BP Readings from Last 3 Encounters:   01/07/19 132/78   12/17/18 (!) 168/94   03/14/18 110/70                Passed - Recent (12 mo) or future (30 days) visit within the authorizing provider's specialty    Patient had office visit in the last 12 months or has a visit in the next 30 days with authorizing provider or within the authorizing provider's specialty.  See \"Patient Info\" tab in inbasket, or \"Choose Columns\" in Meds & Orders section of the refill encounter.             Passed - Patient does not have Tadalafil, Vardenafil, or Sildenafil on their medication list       Passed - Medication is active on med list       Passed - Patient is 18 years of age or older        "

## 2019-01-24 ENCOUNTER — TELEPHONE (OUTPATIENT)
Dept: FAMILY MEDICINE | Facility: OTHER | Age: 59
End: 2019-01-24

## 2019-01-24 NOTE — TELEPHONE ENCOUNTER
Panel Management Review      Patient has the following on his problem list:       Composite cancer screening  Chart review shows that this patient is due/due soon for the following Colonoscopy  Summary:    Patient is due/failing the following:   COLONOSCOPY    Action needed:   COLON    Type of outreach:    Phone, left message for patient to call back.  and Sent letter.    Questions for provider review:    None                                                                                                                                    Kathy Spaulding MA     1/24/2019       Chart routed to  .

## 2019-01-24 NOTE — LETTER
[facility]  [address] [facility] [id number] [phone]        Fredy Clark  [address] [facility] [address]      January 24, 2019      Dear Fredy,    I care about your health and have reviewed your health plan, including your medical conditions, medication list, and lab results and am making recommendations based on this review, to better manage your health.    You are in particular need of attention regarding:  -Colon Cancer Screening    I am recommending that you:  -schedule a COLONOSCOPY.  Colon cancer is now the second leading cause of cancer-related deaths in the [place] for both men and women.  There are over 130,000 new cases and 50,000 deaths per year from colon cancer.  A recent study, which included patients ages 55 to 79 found 50,400 [place] deaths from colorectal cancer could have been prevented if patients had undergone a colonoscopy in the previous 10 years.    If you have not had a colonoscopy, we encourage you to schedule by contacting us at [phone], Monday through Friday.  After hours, you may leave a message and we will return your call during normal business hours.      There is another option called a FIT test, if you don t wish to have a colonoscopy, which needs to be repeated every year.  It does replace the colonoscopy for colorectal cancer screening and can detect hidden bleeding in the lower colon.  If a positive result is obtained, you would be referred for a colonoscopy. Please discuss this option with your provider.      For patients under/uninsured, we recommend you contact the [employer] program. [employer] is a free colorectal cancer screening program that provides colonoscopies for eligible under/uninsured [place] men and women. If you are interested in receiving a free colonoscopy, please call [facility] at [phone] (mention code [employer]) to see if you re eligible.     If you've had the preventative screening  completed at another facility or feel you're not due for this screening, please call our clinic at the number listed above or send us a My Chart message so we can update our records. We would like to thank you in advance for taking the time to take care of your health.  If you have any questions, please don t hesitate to contact our clinic.    Sincerely,       Your [facility] Team

## 2019-02-15 ENCOUNTER — OFFICE VISIT (OUTPATIENT)
Dept: ORTHOPEDICS | Facility: CLINIC | Age: 59
End: 2019-02-15
Payer: COMMERCIAL

## 2019-02-15 ENCOUNTER — ANCILLARY PROCEDURE (OUTPATIENT)
Dept: GENERAL RADIOLOGY | Facility: CLINIC | Age: 59
End: 2019-02-15
Attending: INTERNAL MEDICINE
Payer: COMMERCIAL

## 2019-02-15 VITALS
HEIGHT: 71 IN | WEIGHT: 262.3 LBS | DIASTOLIC BLOOD PRESSURE: 85 MMHG | BODY MASS INDEX: 36.72 KG/M2 | SYSTOLIC BLOOD PRESSURE: 166 MMHG

## 2019-02-15 DIAGNOSIS — M25.551 RIGHT HIP PAIN: ICD-10-CM

## 2019-02-15 DIAGNOSIS — M16.11 PRIMARY OSTEOARTHRITIS OF RIGHT HIP: Primary | ICD-10-CM

## 2019-02-15 PROCEDURE — 99213 OFFICE O/P EST LOW 20 MIN: CPT | Performed by: ORTHOPAEDIC SURGERY

## 2019-02-15 PROCEDURE — 73502 X-RAY EXAM HIP UNI 2-3 VIEWS: CPT | Mod: TC

## 2019-02-15 ASSESSMENT — MIFFLIN-ST. JEOR: SCORE: 2026.91

## 2019-02-15 NOTE — NURSING NOTE
Fredy to follow up with Primary Care provider regarding elevated blood pressure.    Patient also states he never filled his RX for Eliquis due to cost. Informed patient to discuss with his PCP...............Fátima Gautam CMA  ([facility])

## 2019-02-15 NOTE — LETTER
"    2/15/2019         RE: Fredy Clark  [address] [facility] [address]        Dear Colleague,    Thank you for referring your patient, Fredy Clark, to the [facility]. Please see a copy of my visit note below.    Office Visit-Follow up    Chief Complaint: Fredy Clark is a 58 year old male who is being seen for   Chief Complaint   Patient presents with     Musculoskeletal Problem     right hip pain     Consult       History of Present Illness:   Presents for 6 months worth of deep lateral right hip pain.  Progressively worsening.  Similar to his contralateral side that he underwent a total hip for.  Pain is waking him at night.  His pain at rest.  He has difficulty putting on his socks because of his pain.  He is taking occasional Aleve.  Rest activity modification.      REVIEW OF SYSTEMS  General: negative for, night sweats, dizziness, fatigue  Resp: No shortness of breath and no cough  CV: negative for chest pain, syncope or near-syncope  GI: negative for nausea, vomiting and diarrhea  : negative for dysuria and hematuria  Musculoskeletal: as above  Neurologic: negative for syncope   Hematologic: negative for bleeding disorder    Physical Exam:  Vitals: /85   Ht 1.803 m (5' 11\")   Wt 119 kg (262 lb 4.8 oz)   BMI 36.58 kg/m     BMI= Body mass index is 36.58 kg/m .  Constitutional: healthy, alert and no acute distress   Psychiatric: mentation appears normal and affect normal/bright  NEURO: no focal deficits  RESP: Normal with easy respirations and no use of accessory muscles to breathe, no audible wheezing or retractions  CV: RLE: no edema         Regular rate and rhythm by palpation  SKIN: No erythema, rashes, excoriation, or breakdown. No evidence of infection.   JOINT/EXTREMITIES:right hip: Flexion to approximately 95 degrees.  0 internal rotation.  Any attempts to do so re-creates pain.  No focal areas of tenderness.  No deformity.  GAIT: antalgic            Diagnostic " Modalities:  right hip X-ray: superior wear , with bone on bone wear, osteophytes  Independent visualization of the images was performed.      Impression: right hip primary osteoarthritis    Plan:  All of the above pertinent physical exam and imaging modalities findings was reviewed with Fredy.    I discussed his options.  He has end-stage osteoarthritis impacting the quality of his life.  Next potential option would be a hip replacement.  However I reviewed his last cardiology note.  It was recommended he start Eliquis.  Unfortunately he was unable to afford this.  He recently also discontinued another medication recommended.  He has a history of ischemic cardiomyopathy, a flutter, coronary artery disease (status post CABG).    I discussed in order to even consider hip replacement we would need an evaluation again by cardiology or his primary care doc.  He verbalized understanding of this.      Return to clinic PRN, or sooner as needed for changes.  Re-x-ray on return: No    Jeremy Luis D.O.          Again, thank you for allowing me to participate in the care of your patient.        Sincerely,        Dejon Luis, DO

## 2019-02-15 NOTE — PROGRESS NOTES
"Office Visit-Follow up    Chief Complaint: Fredy Clark is a 58 year old male who is being seen for   Chief Complaint   Patient presents with     Musculoskeletal Problem     right hip pain     Consult       History of Present Illness:   Presents for 6 months worth of deep lateral right hip pain.  Progressively worsening.  Similar to his contralateral side that he underwent a total hip for.  Pain is waking him at night.  His pain at rest.  He has difficulty putting on his socks because of his pain.  He is taking occasional Aleve.  Rest activity modification.      REVIEW OF SYSTEMS  General: negative for, night sweats, dizziness, fatigue  Resp: No shortness of breath and no cough  CV: negative for chest pain, syncope or near-syncope  GI: negative for nausea, vomiting and diarrhea  : negative for dysuria and hematuria  Musculoskeletal: as above  Neurologic: negative for syncope   Hematologic: negative for bleeding disorder    Physical Exam:  Vitals: /85   Ht 1.803 m (5' 11\")   Wt 119 kg (262 lb 4.8 oz)   BMI 36.58 kg/m    BMI= Body mass index is 36.58 kg/m .  Constitutional: healthy, alert and no acute distress   Psychiatric: mentation appears normal and affect normal/bright  NEURO: no focal deficits  RESP: Normal with easy respirations and no use of accessory muscles to breathe, no audible wheezing or retractions  CV: RLE: no edema         Regular rate and rhythm by palpation  SKIN: No erythema, rashes, excoriation, or breakdown. No evidence of infection.   JOINT/EXTREMITIES:right hip: Flexion to approximately 95 degrees.  0 internal rotation.  Any attempts to do so re-creates pain.  No focal areas of tenderness.  No deformity.  GAIT: antalgic            Diagnostic Modalities:  right hip X-ray: superior wear , with bone on bone wear, osteophytes  Independent visualization of the images was performed.      Impression: right hip primary osteoarthritis    Plan:  All of the above pertinent physical exam and " imaging modalities findings was reviewed with Fredy.    I discussed his options.  He has end-stage osteoarthritis impacting the quality of his life.  Next potential option would be a hip replacement.  However I reviewed his last cardiology note.  It was recommended he start Eliquis.  Unfortunately he was unable to afford this.  He recently also discontinued another medication recommended.  He has a history of ischemic cardiomyopathy, a flutter, coronary artery disease (status post CABG).    I discussed in order to even consider hip replacement we would need an evaluation again by cardiology or his primary care doc.  He verbalized understanding of this.      Return to clinic PRN, or sooner as needed for changes.  Re-x-ray on return: No    Jeremy Luis D.O.

## 2019-03-08 DIAGNOSIS — E11.9 TYPE 2 DIABETES MELLITUS WITHOUT COMPLICATION, WITHOUT LONG-TERM CURRENT USE OF INSULIN (H): ICD-10-CM

## 2019-03-08 NOTE — TELEPHONE ENCOUNTER
"Requested Prescriptions   Pending Prescriptions Disp Refills     metFORMIN (GLUCOPHAGE) 1000 MG tablet [Pharmacy Med Name: METFORMIN HCL 1000MG TABS]  Last Written Prescription Date:  12/4/18  Last Fill Quantity: 180,  # refills: 0   Last office visit: 12/17/2018 with prescribing provider:  Mariela   Future Office Visit:     180 tablet 0     Sig: TAKE ONE TABLET BY MOUTH TWICE A DAY WITH MEALS    Biguanide Agents Failed - 3/8/2019  5:35 AM       Failed - Blood pressure less than 140/90 in past 6 months    BP Readings from Last 3 Encounters:   02/15/19 166/85   01/07/19 132/78   12/17/18 (!) 168/94                Passed - Patient has documented LDL within the past 12 mos.    Recent Labs   Lab Test 12/17/18  0756   *            Passed - Patient has had a Microalbumin in the past 15 mos.    Recent Labs   Lab Test 10/01/18  0935   MICROL 36   UMALCR 43.74*            Passed - Patient is age 10 or older       Passed - Patient has documented A1c within the specified period of time.    If HgbA1C is 8 or greater, it needs to be on file within the past 3 months.  If less than 8, must be on file within the past 6 months.     Recent Labs   Lab Test 12/17/18  0817   A1C 7.6*            Passed - Patient's CR is NOT>1.4 OR Patient's EGFR is NOT<45 within past 12 mos.    Recent Labs   Lab Test 12/17/18  0756   GFRESTIMATED >90   GFRESTBLACK >90       Recent Labs   Lab Test 12/17/18  0756   CR 0.72            Passed - Patient does NOT have a diagnosis of CHF.       Passed - Medication is active on med list       Passed - Recent (6 mo) or future (30 days) visit within the authorizing provider's specialty    Patient had office visit in the last 6 months or has a visit in the next 30 days with authorizing provider or within the authorizing provider's specialty.  See \"Patient Info\" tab in inbasket, or \"Choose Columns\" in Meds & Orders section of the refill encounter.              "

## 2019-03-08 NOTE — TELEPHONE ENCOUNTER
Routing refill request to provider for review/approval because:  Labs out of range:  BP, LDL, Microalbumin    JESIKA AvelarN, RN  [facility]

## 2019-05-09 ENCOUNTER — TELEPHONE (OUTPATIENT)
Dept: FAMILY MEDICINE | Facility: OTHER | Age: 59
End: 2019-05-09

## 2019-05-09 NOTE — LETTER
[facility]  [address] [facility] [id number] [phone]        Fredy Vasquezbg  [address] [facility] [address]      May 9, 2019      Dear Fredy,    I care about your health and have reviewed your health plan, including your medical conditions, medication list, and lab results and am making recommendations based on this review, to better manage your health.    You are in particular need of attention regarding:  -High Blood Pressure  -Colon Cancer Screening  -Wellness (Physical) Visit     I am recommending that you:  -schedule a WELLNESS (Physical) APPOINTMENT with me.   I will check fasting labs the same day - nothing to eat except water and meds for 8-10 hours prior.  -schedule a COLONOSCOPY.  Colon cancer is now the second leading cause of cancer-related deaths in the [place] for both men and women.  There are over 130,000 new cases and 50,000 deaths per year from colon cancer.  A recent study, which included patients ages 55 to 79 found 50,400 [place] deaths from colorectal cancer could have been prevented if patients had undergone a colonoscopy in the previous 10 years.    If you have not had a colonoscopy, we encourage you to schedule by contacting us at [phone], Monday through Friday.  After hours, you may leave a message and we will return your call during normal business hours.      There is another option called a FIT test, if you don t wish to have a colonoscopy, which needs to be repeated every year.  It does replace the colonoscopy for colorectal cancer screening and can detect hidden bleeding in the lower colon.  If a positive result is obtained, you would be referred for a colonoscopy. Please discuss this option with your provider.      For patients under/uninsured, we recommend you contact the [employer] program. [employer] is a free colorectal cancer screening program that provides colonoscopies for eligible under/uninsured [place] men and  women. If you are interested in receiving a free colonoscopy, please call [employer] at [phone] (mention code [employer]) to see if you re eligible.     If you've had the preventative screening completed at another facility or feel you're not due for this screening, please call our clinic at the number listed above or send us a My Chart message so we can update our records. We would like to thank you in advance for taking the time to take care of your health.  If you have any questions, please don t hesitate to contact our clinic.    Sincerely,       Your [facility] Team

## 2019-05-09 NOTE — TELEPHONE ENCOUNTER
Panel Management Review      Patient has the following on his problem list:     Diabetes    ASA: Not Required     Last A1C  Lab Results   Component Value Date    A1C 7.6 12/17/2018    A1C 9.2 10/01/2018    A1C 8.3 12/18/2017    A1C 9.0 02/27/2017    A1C 7.4 01/06/2016     A1C tested: Passed    Last LDL:    Lab Results   Component Value Date    CHOL 176 12/17/2018     Lab Results   Component Value Date    HDL 33 12/17/2018     Lab Results   Component Value Date     12/17/2018     Lab Results   Component Value Date    TRIG 119 12/17/2018     Lab Results   Component Value Date    CHOLHDLRATIO 6.4 09/09/2015     Lab Results   Component Value Date    NHDL 143 12/17/2018       Is the patient on a Statin? NO             Is the patient on Aspirin? NO        Last three blood pressure readings:  BP Readings from Last 3 Encounters:   02/15/19 166/85   01/07/19 132/78   12/17/18 (!) 168/94       Date of last diabetes office visit: 12/17/2018     Tobacco History:     History   Smoking Status     Former Smoker     Packs/day: 2.00     Years: 20.00     Types: Cigarettes     Quit date: 6/1/1999   Smokeless Tobacco     Former User         Hypertension   Last three blood pressure readings:  BP Readings from Last 3 Encounters:   02/15/19 166/85   01/07/19 132/78   12/17/18 (!) 168/94     Blood pressure: FAILED    HTN Guidelines:  Less than 140/90      Composite cancer screening  Chart review shows that this patient is due/due soon for the following Colonoscopy  Summary:    Patient is due/failing the following:   COLONOSCOPY and PHYSICAL    Action needed:   Patient needs office visit for Physical, Diabetic eye exam. and Patient needs referral/order: Colonoscopy.     Type of outreach:    Sent letter. Patient preferred contact.     Questions for provider review:    None                                                                                                                                    Michelle Munoz MA

## 2019-06-14 DIAGNOSIS — E11.9 TYPE 2 DIABETES MELLITUS WITHOUT COMPLICATION, WITHOUT LONG-TERM CURRENT USE OF INSULIN (H): ICD-10-CM

## 2019-06-14 NOTE — TELEPHONE ENCOUNTER
Medication is being filled for 1 time refill only due to:  Patient needs to be seen because due for 6 month diabetic check.     Will route to scheduling to call and schedule.     JESIKA AvelarN, RN  [facility]

## 2019-06-14 NOTE — TELEPHONE ENCOUNTER
"Requested Prescriptions   Pending Prescriptions Disp Refills     metFORMIN (GLUCOPHAGE) 1000 MG tablet [Pharmacy Med Name: METFORMIN HCL 1000MG TABS] 180 tablet 0     Sig: TAKE ONE TABLET BY MOUTH TWICE A DAY WITH MEALS   Last Written Prescription Date:  3/8/19  Last Fill Quantity: 180,  # refills: 0   Last office visit: 12/17/2018 with prescribing provider:  12/17/18   Future Office Visit:        Biguanide Agents Failed - 6/14/2019  6:02 AM        Failed - Blood pressure less than 140/90 in past 6 months     BP Readings from Last 3 Encounters:   02/15/19 166/85   01/07/19 132/78   12/17/18 (!) 168/94                 Passed - Patient has documented LDL within the past 12 mos.     Recent Labs   Lab Test 12/17/18  0756   *             Passed - Patient has had a Microalbumin in the past 15 mos.     Recent Labs   Lab Test 10/01/18  0935   MICROL 36   UMALCR 43.74*             Passed - Patient is age 10 or older        Passed - Patient has documented A1c within the specified period of time.     If HgbA1C is 8 or greater, it needs to be on file within the past 3 months.  If less than 8, must be on file within the past 6 months.     Recent Labs   Lab Test 12/17/18  0817   A1C 7.6*             Passed - Patient's CR is NOT>1.4 OR Patient's EGFR is NOT<45 within past 12 mos.     Recent Labs   Lab Test 12/17/18  0756   GFRESTIMATED >90   GFRESTBLACK >90       Recent Labs   Lab Test 12/17/18  0756   CR 0.72             Passed - Patient does NOT have a diagnosis of CHF.        Passed - Medication is active on med list        Passed - Recent (6 mo) or future (30 days) visit within the authorizing provider's specialty     Patient had office visit in the last 6 months or has a visit in the next 30 days with authorizing provider or within the authorizing provider's specialty.  See \"Patient Info\" tab in inbasket, or \"Choose Columns\" in Meds & Orders section of the refill encounter.            "

## 2019-07-09 DIAGNOSIS — E11.9 TYPE 2 DIABETES MELLITUS WITHOUT COMPLICATION, WITHOUT LONG-TERM CURRENT USE OF INSULIN (H): ICD-10-CM

## 2019-07-10 NOTE — TELEPHONE ENCOUNTER
"Metformin 1000mg  Last Written Prescription Date:  6/14/19  Last Fill Quantity: 60,  # refills: 0 - Needs office visit for further refills to be granted!  Last office visit: 12/17/2018 with prescribing provider:     Future Office Visit:  NONE   Requested Prescriptions   Pending Prescriptions Disp Refills     metFORMIN (GLUCOPHAGE) 1000 MG tablet [Pharmacy Med Name: METFORMIN HCL 1000MG TABS] 60 tablet 0     Sig: TAKE ONE TABLET BY MOUTH TWICE A DAY WITH MEALS       Biguanide Agents Failed - 7/9/2019  1:05 AM        Failed - Blood pressure less than 140/90 in past 6 months     BP Readings from Last 3 Encounters:   02/15/19 166/85   01/07/19 132/78   12/17/18 (!) 168/94           Failed - Patient has documented A1c within the specified period of time.     If HgbA1C is 8 or greater, it needs to be on file within the past 3 months.  If less than 8, must be on file within the past 6 months.     Recent Labs   Lab Test 12/17/18  0817   A1C 7.6*           Failed - Recent (6 mo) or future (30 days) visit within the authorizing provider's specialty     Patient had office visit in the last 6 months or has a visit in the next 30 days with authorizing provider or within the authorizing provider's specialty.  See \"Patient Info\" tab in inbasket, or \"Choose Columns\" in Meds & Orders section of the refill encounter.            Passed - Patient has documented LDL within the past 12 mos.     Recent Labs   Lab Test 12/17/18  0756   *           Passed - Patient has had a Microalbumin in the past 15 mos.     Recent Labs   Lab Test 10/01/18  0935   MICROL 36   UMALCR 43.74*           Passed - Patient is age 10 or older        Passed - Patient's CR is NOT>1.4 OR Patient's EGFR is NOT<45 within past 12 mos.     Recent Labs   Lab Test 12/17/18  0756   GFRESTIMATED >90   GFRESTBLACK >90     Recent Labs   Lab Test 12/17/18  0756   CR 0.72           Passed - Patient does NOT have a diagnosis of CHF.        Passed - Medication is active " on med list      Routing refill request to provider for review/approval because:  Labs out of range:  See above  Patient needs to be seen because:  It has been over 6 months since last seen  MERCEDES Mosquera

## 2019-07-20 DIAGNOSIS — E11.9 TYPE 2 DIABETES MELLITUS WITHOUT COMPLICATION, WITHOUT LONG-TERM CURRENT USE OF INSULIN (H): ICD-10-CM

## 2019-07-22 NOTE — TELEPHONE ENCOUNTER
Routing refill request to provider for review/approval because:  Melissa given x1 and patient did not follow up, please advise  Labs out of range:  BP, LDL, Microabumin  Labs not current:  A1C  Patient needs to be seen because:  Due for diabetic follow up    JESIKA AvelarN, RN  [facility]

## 2019-07-22 NOTE — TELEPHONE ENCOUNTER
"Requested Prescriptions   Pending Prescriptions Disp Refills     metFORMIN (GLUCOPHAGE) 1000 MG tablet [Pharmacy Med Name: METFORMIN HCL 1000MG TABS] 60 tablet 0     Sig: TAKE ONE TABLET BY MOUTH TWICE A DAY WITH MEALS   Last Written Prescription Date:  7/10/19  Last Fill Quantity: 60,  # refills: 0   Last office visit: 12/17/2018 with prescribing provider:  12/17/18   Future Office Visit:        Biguanide Agents Failed - 7/20/2019  8:02 AM        Failed - Blood pressure less than 140/90 in past 6 months     BP Readings from Last 3 Encounters:   02/15/19 166/85   01/07/19 132/78   12/17/18 (!) 168/94                 Failed - Patient has documented A1c within the specified period of time.     If HgbA1C is 8 or greater, it needs to be on file within the past 3 months.  If less than 8, must be on file within the past 6 months.     Recent Labs   Lab Test 12/17/18  0817   A1C 7.6*             Failed - Recent (6 mo) or future (30 days) visit within the authorizing provider's specialty     Patient had office visit in the last 6 months or has a visit in the next 30 days with authorizing provider or within the authorizing provider's specialty.  See \"Patient Info\" tab in inbasket, or \"Choose Columns\" in Meds & Orders section of the refill encounter.            Passed - Patient has documented LDL within the past 12 mos.     Recent Labs   Lab Test 12/17/18  0756   *             Passed - Patient has had a Microalbumin in the past 15 mos.     Recent Labs   Lab Test 10/01/18  0935   MICROL 36   UMALCR 43.74*             Passed - Patient is age 10 or older        Passed - Patient's CR is NOT>1.4 OR Patient's EGFR is NOT<45 within past 12 mos.     Recent Labs   Lab Test 12/17/18  0756   GFRESTIMATED >90   GFRESTBLACK >90       Recent Labs   Lab Test 12/17/18  0756   CR 0.72             Passed - Patient does NOT have a diagnosis of CHF.        Passed - Medication is active on med list        "

## 2019-08-22 DIAGNOSIS — E11.9 TYPE 2 DIABETES MELLITUS WITHOUT COMPLICATION, WITHOUT LONG-TERM CURRENT USE OF INSULIN (H): ICD-10-CM

## 2019-08-22 NOTE — TELEPHONE ENCOUNTER
"Requested Prescriptions   Pending Prescriptions Disp Refills     metFORMIN (GLUCOPHAGE) 1000 MG tablet [Pharmacy Med Name: METFORMIN HCL 1000MG TABS] 60 tablet 0     Sig: TAKE ONE TABLET BY MOUTH TWICE A DAY WITH MEALS   Last Written Prescription Date:  7/22/19  Last Fill Quantity: 60,  # refills: 0   Last office visit: 12/17/2018 with prescribing provider:  12/17/18   Future Office Visit:        Biguanide Agents Failed - 8/22/2019  1:06 AM        Failed - Blood pressure less than 140/90 in past 6 months     BP Readings from Last 3 Encounters:   02/15/19 166/85   01/07/19 132/78   12/17/18 (!) 168/94                 Failed - Patient has documented A1c within the specified period of time.     If HgbA1C is 8 or greater, it needs to be on file within the past 3 months.  If less than 8, must be on file within the past 6 months.     Recent Labs   Lab Test 12/17/18  0817   A1C 7.6*             Failed - Recent (6 mo) or future (30 days) visit within the authorizing provider's specialty     Patient had office visit in the last 6 months or has a visit in the next 30 days with authorizing provider or within the authorizing provider's specialty.  See \"Patient Info\" tab in inbasket, or \"Choose Columns\" in Meds & Orders section of the refill encounter.            Passed - Patient has documented LDL within the past 12 mos.     Recent Labs   Lab Test 12/17/18  0756   *             Passed - Patient has had a Microalbumin in the past 15 mos.     Recent Labs   Lab Test 10/01/18  0935   MICROL 36   UMALCR 43.74*             Passed - Patient is age 10 or older        Passed - Patient's CR is NOT>1.4 OR Patient's EGFR is NOT<45 within past 12 mos.     Recent Labs   Lab Test 12/17/18  0756   GFRESTIMATED >90   GFRESTBLACK >90       Recent Labs   Lab Test 12/17/18  0756   CR 0.72             Passed - Patient does NOT have a diagnosis of CHF.        Passed - Medication is active on med list        "

## 2019-08-22 NOTE — TELEPHONE ENCOUNTER
Routing refill request to provider for review/approval because:  Melissa given x1 and patient did not follow up, please advise  Labs out of range:  LDL, Microalbumin, BP  Labs not current:  A1C  Patient needs to be seen because it has been more than 1 year since last office visit.    Serenity Douglas, BSN, RN  [facility]

## 2019-12-23 ENCOUNTER — OFFICE VISIT (OUTPATIENT)
Dept: FAMILY MEDICINE | Facility: OTHER | Age: 59
End: 2019-12-23
Payer: COMMERCIAL

## 2019-12-23 VITALS
HEIGHT: 71 IN | TEMPERATURE: 97.8 F | RESPIRATION RATE: 18 BRPM | SYSTOLIC BLOOD PRESSURE: 118 MMHG | BODY MASS INDEX: 36.29 KG/M2 | DIASTOLIC BLOOD PRESSURE: 68 MMHG | HEART RATE: 78 BPM | WEIGHT: 259.2 LBS | OXYGEN SATURATION: 98 %

## 2019-12-23 DIAGNOSIS — E78.5 HYPERLIPIDEMIA LDL GOAL <100: Primary | ICD-10-CM

## 2019-12-23 DIAGNOSIS — Z12.5 SCREENING FOR PROSTATE CANCER: ICD-10-CM

## 2019-12-23 DIAGNOSIS — E66.01 MORBID OBESITY (H): ICD-10-CM

## 2019-12-23 DIAGNOSIS — N41.1 CHRONIC PROSTATITIS: ICD-10-CM

## 2019-12-23 DIAGNOSIS — M16.11 PRIMARY OSTEOARTHRITIS OF RIGHT HIP: ICD-10-CM

## 2019-12-23 DIAGNOSIS — I10 BENIGN ESSENTIAL HYPERTENSION: ICD-10-CM

## 2019-12-23 DIAGNOSIS — E11.9 TYPE 2 DIABETES MELLITUS WITHOUT COMPLICATION, WITHOUT LONG-TERM CURRENT USE OF INSULIN (H): ICD-10-CM

## 2019-12-23 DIAGNOSIS — I25.10 CARDIOVASCULAR DISEASE: ICD-10-CM

## 2019-12-23 LAB
ANION GAP SERPL CALCULATED.3IONS-SCNC: 5 MMOL/L (ref 3–14)
BUN SERPL-MCNC: 25 MG/DL (ref 7–30)
CALCIUM SERPL-MCNC: 9.3 MG/DL (ref 8.5–10.1)
CHLORIDE SERPL-SCNC: 100 MMOL/L (ref 94–109)
CHOLEST SERPL-MCNC: 222 MG/DL
CO2 SERPL-SCNC: 29 MMOL/L (ref 20–32)
CREAT SERPL-MCNC: 0.96 MG/DL (ref 0.66–1.25)
CREAT UR-MCNC: 167 MG/DL
GFR SERPL CREATININE-BSD FRML MDRD: 85 ML/MIN/{1.73_M2}
GLUCOSE SERPL-MCNC: 255 MG/DL (ref 70–99)
HBA1C MFR BLD: 9.8 % (ref 0–5.6)
HDLC SERPL-MCNC: 39 MG/DL
LDLC SERPL CALC-MCNC: 152 MG/DL
MICROALBUMIN UR-MCNC: 23 MG/L
MICROALBUMIN/CREAT UR: 13.77 MG/G CR (ref 0–17)
NONHDLC SERPL-MCNC: 183 MG/DL
POTASSIUM SERPL-SCNC: 3.9 MMOL/L (ref 3.4–5.3)
PSA SERPL-ACNC: 8.09 UG/L (ref 0–4)
SODIUM SERPL-SCNC: 134 MMOL/L (ref 133–144)
TRIGL SERPL-MCNC: 156 MG/DL
TSH SERPL DL<=0.005 MIU/L-ACNC: 1.49 MU/L (ref 0.4–4)

## 2019-12-23 PROCEDURE — 80048 BASIC METABOLIC PNL TOTAL CA: CPT | Performed by: INTERNAL MEDICINE

## 2019-12-23 PROCEDURE — G0103 PSA SCREENING: HCPCS | Performed by: INTERNAL MEDICINE

## 2019-12-23 PROCEDURE — 82043 UR ALBUMIN QUANTITATIVE: CPT | Performed by: INTERNAL MEDICINE

## 2019-12-23 PROCEDURE — 93005 ELECTROCARDIOGRAM TRACING: CPT | Performed by: INTERNAL MEDICINE

## 2019-12-23 PROCEDURE — 83036 HEMOGLOBIN GLYCOSYLATED A1C: CPT | Performed by: INTERNAL MEDICINE

## 2019-12-23 PROCEDURE — 36415 COLL VENOUS BLD VENIPUNCTURE: CPT | Performed by: INTERNAL MEDICINE

## 2019-12-23 PROCEDURE — 99214 OFFICE O/P EST MOD 30 MIN: CPT | Performed by: INTERNAL MEDICINE

## 2019-12-23 PROCEDURE — 84443 ASSAY THYROID STIM HORMONE: CPT | Performed by: INTERNAL MEDICINE

## 2019-12-23 PROCEDURE — 80061 LIPID PANEL: CPT | Performed by: INTERNAL MEDICINE

## 2019-12-23 ASSESSMENT — MIFFLIN-ST. JEOR: SCORE: 2007.85

## 2019-12-23 ASSESSMENT — PAIN SCALES - GENERAL: PAINLEVEL: NO PAIN (0)

## 2019-12-23 NOTE — PROGRESS NOTES
"Subjective     Fredy Clark is a 59 year old male who presents to clinic today for the following health issues:    HPI   Chief Complaint   Patient presents with     Musculoskeletal Problem     right hip pain recheck. Discuss getting hip replaced                      Chief Complaint         The patient is a uniquely original 59-year-old gentleman who presents today because his right hip hurts.  He was last seen about a year ago and has had minimal medical care in the interim.  He does have diabetes but quit his medication because he does not think he \"needs it\".  He has extensive history of heart disease including multivessel bypass and significant ischemic cardiomyopathy with a nonfunctional ICD in place.  He notes he is quite perturbed that he has ICD in the first place as it is never fired and therefore was placed unnecessarily.  I did explain to him that I have a brand-new spare tire in my  that I have never used   I still need it.  He denies any polyuria or polydipsia though he does have some symptoms of prosthetic obstruction which he notes the medicine made him tired so he quit.  He is quite upset that he has not had his hip surgery yet though he never followed up with a cardiologist or the orthopedic surgeon.  Additionally, he refuses colonoscopy because he states that that should be able to be done simultaneously with his hip surgery.  I explained to him the concept of a sterile surgery versus an unsterile colonoscopy, he does not seem to appreciate the significance of this logic.  He does have rather significant callus formation on his feet but is walking around in old tennis shoes without socks on in December.  He notes that he has very little sensation of cold in his feet.                         PAST, FAMILY,SOCIAL HISTORY:     Medical  History:   has a past medical history of Coronary atherosclerosis of unspecified type of vessel, native or graft, Obesity, Class II, BMI 35-39.9, with comorbidity " (10/26/2015), Obesity, unspecified, Other and unspecified hyperlipidemia, Type II or unspecified type diabetes mellitus without mention of complication, not stated as uncontrolled, and Unspecified essential hypertension.     Surgical History:   has a past surgical history that includes CABG, ARTERY-VEIN, FIVE; ANESTH,CARDIOVERTER/DEFIB (01/2001); PPM GENERATOR REMOVAL (06/16/06); and Arthroplasty hip (Left, 1/5/2016).     Social History:   reports that he quit smoking about 20 years ago. His smoking use included cigarettes. He has a 40.00 pack-year smoking history. He has quit using smokeless tobacco. He reports current alcohol use. He reports that he does not use drugs.     Family History:  family history is not on file.            MEDICATIONS  Current Outpatient Medications   Medication Sig Dispense Refill     CAYENNE 500 MG OR CAPS 1 tab twice daily  0     lisinopril-hydrochlorothiazide (PRINZIDE/ZESTORETIC) 20-25 MG tablet TAKE TWO TABLETS BY MOUTH EVERY  tablet 3     MULTIVITAMIN TABS   OR 1 tab daily  0     OMEGA-3 CAPS   OR 1200 mg.--1 tab bid  0     VITAMIN C 500 MG OR TABS 1 tab daily 60 0     metFORMIN (GLUCOPHAGE) 1000 MG tablet TAKE ONE TABLET BY MOUTH TWICE A DAY WITH MEALS (Patient not taking: Reported on 12/23/2019) 30 tablet 0     metFORMIN (GLUCOPHAGE) 1000 MG tablet TAKE ONE TABLET BY MOUTH TWICE A DAY WITH MEALS (Patient not taking: Reported on 12/23/2019) 60 tablet 0         --------------------------------------------------------------------------------------------------------------------                              Review of Systems       LUNGS: Pt denies: cough, excess sputum, hemoptysis, or shortness of breath.   HEART: Pt denies: chest pain, arrhythmia, syncope, tachy or bradyarrhythmia.   GI: Pt denies: nausea, vomiting, diarrhea, constipation, melena, or hematochezia.   NEURO: Pt denies: seizures, strokes, diplopia, weakness, paraesthesias, or paralysis.   SKIN: Pt denies: itching,  "rashes, discoloration, or specific lesions of concern. Denies recent hair loss.   PSYCH: The patient denies significant depression, anxiety, mood imbalance. Specifically denies any suicidal ideation.                                     Examination    /68 (BP Location: Left arm, Patient Position: Sitting, Cuff Size: Adult Large)   Pulse 78   Temp 97.8  F (36.6  C) (Temporal)   Resp 18   Ht 1.803 m (5' 11\")   Wt 117.6 kg (259 lb 3.2 oz)   SpO2 98%   BMI 36.15 kg/m    But slightly decreased   Constitutional: The patient appears to be in no acute distress. The patient appears to be adequately hydrated. No acute respiratory or hemodynamic distress is noted at this time.   LUNGS: clear bilaterally, airflow is brisk, no intercostal retraction or stridor is noted. No coughing is noted during visit.   HEART:  regular without rubs, clicks, gallops, or murmurs. PMI is nondisplaced. Upstrokes are brisk. S1,S2 are heard.   GI: Abdomen is soft, without rebound, guarding or tenderness. Bowel sounds are appropriate. No renal bruits are heard.   NEURO: Pt is alert and appropriate. No neurologic lateralization is noted. Cranial nerves 2-12 are intact. Peripheral sensory is decreased in the feet sKIN:  warm and dry. No erythema, or rashes are noted. No specific lesions of concern are noted.    PSYCH: The patient appears grossly appropriate but poorly informed and highly opinionated. Maintains good eye contact, does not have any jittery or atypical motion. Displays appropriate affect.                                             Decision Making    1. Morbid obesity (H)  Recommend weight loss or caloric restriction    2. Type 2 diabetes mellitus without complication, without long-term current use of insulin (H)  We will check A1c and lab work.  Patient refuses initiation of any medications until I can prove that he \"needs them\"  - HEMOGLOBIN A1C  - Albumin Random Urine Quantitative with Creat Ratio  - FOOT EXAM    3. " Hyperlipidemia LDL goal <100  Discussed the need for statin therapy.  Patient refuses statin therapy because he feels that his cholesterol is good.  I explained to him that he is already had a multivessel bypass therefore it cannot be all that awesome.  - Lipid panel reflex to direct LDL Fasting    4. Benign essential hypertension  Recommend blood pressure management with an ACE inhibitor  - TSH WITH FREE T4 REFLEX  - BASIC METABOLIC PANEL    5. Cardiovascular disease  We will check EKG to determine if he is in atrial flutter still  - EKG 12-lead complete w/read - Clinics    6. Screening for prostate cancer  Screening done  - PSA, screen    7. Primary osteoarthritis of right hip  Refer back to orthopedics as he chooses  Explained that he cannot have his colonoscopy simultaneous with the total hip arthroplasty                                 FOLLOW UP   I have asked the patient to make an appointment for followup with me in 2 weeks (though this is unlikely).  We will need to discuss the addition of ACE inhibitor, aspirin, statin, some form of glucose management etc.            I have carefully explained the diagnosis and treatment options to the patient.  The patient has displayed an understanding of the above, and all subsequent questions were answered.          DO BETSEY Tierney    Portions of this note were produced using [employer]  Although every attempt at real-time proof reading has been made, occasional grammar/syntax errors may have been missed.

## 2019-12-28 DIAGNOSIS — I10 BENIGN ESSENTIAL HYPERTENSION: ICD-10-CM

## 2019-12-30 RX ORDER — LISINOPRIL AND HYDROCHLOROTHIAZIDE 20; 25 MG/1; MG/1
TABLET ORAL
Qty: 180 TABLET | Refills: 1 | Status: SHIPPED | OUTPATIENT
Start: 2019-12-30 | End: 2020-06-15

## 2019-12-30 NOTE — TELEPHONE ENCOUNTER
Prescription approved per [facility] Refill Protocol.    JESIKA AvelarN, RN  [facility]

## 2019-12-30 NOTE — TELEPHONE ENCOUNTER
"Requested Prescriptions   Pending Prescriptions Disp Refills     lisinopril-hydrochlorothiazide (PRINZIDE/ZESTORETIC) 20-25 MG tablet [Pharmacy Med Name: LISINOPRIL-HCTZ 20-25MG TABS] 180 tablet 3     Sig: TAKE TWO TABLETS BY MOUTH EVERY DAY   Last Written Prescription Date:  12/17/18  Last Fill Quantity: 180,  # refills: 3   Last office visit: 12/23/2019 with prescribing provider:  12/23/19   Future Office Visit:   Next 5 appointments (look out 90 days)    Jan 13, 2020  7:50 AM CST  Return Visit with Dejon Luis DO  [facility] [address] [place] [id number] [phone]           Diuretics (Including Combos) Protocol Passed - 12/28/2019 11:56 AM        Passed - Blood pressure under 140/90 in past 12 months     BP Readings from Last 3 Encounters:   12/23/19 118/68   02/15/19 166/85   01/07/19 132/78                 Passed - Recent (12 mo) or future (30 days) visit within the authorizing provider's specialty     Patient has had an office visit with the authorizing provider or a provider within the authorizing providers department within the previous 12 mos or has a future within next 30 days. See \"Patient Info\" tab in inbasket, or \"Choose Columns\" in Meds & Orders section of the refill encounter.              Passed - Medication is active on med list        Passed - Patient is age 18 or older        Passed - Normal serum creatinine on file in past 12 months     Recent Labs   Lab Test 12/23/19  0749   CR 0.96              Passed - Normal serum potassium on file in past 12 months     Recent Labs   Lab Test 12/23/19  0749   POTASSIUM 3.9                    Passed - Normal serum sodium on file in past 12 months     Recent Labs   Lab Test 12/23/19  0749                 "

## 2020-01-02 ENCOUNTER — TELEPHONE (OUTPATIENT)
Dept: FAMILY MEDICINE | Facility: OTHER | Age: 60
End: 2020-01-02

## 2020-01-02 DIAGNOSIS — E11.9 TYPE 2 DIABETES MELLITUS WITHOUT COMPLICATION, WITHOUT LONG-TERM CURRENT USE OF INSULIN (H): ICD-10-CM

## 2020-01-02 RX ORDER — SULFAMETHOXAZOLE/TRIMETHOPRIM 800-160 MG
1 TABLET ORAL 2 TIMES DAILY
Qty: 28 TABLET | Refills: 0 | Status: SHIPPED | OUTPATIENT
Start: 2020-01-02 | End: 2020-09-14

## 2020-01-02 NOTE — RESULT ENCOUNTER NOTE
Dear Fredy, your recent test results are attached.  The thyroid is well adjusted.  Cholesterol is somewhat elevated with LDL of 152.  Sugar is elevated at 255 but the kidney function is normal.  The prostate-specific antigen is elevated.  Would recommend a short course of antibiotic to rule out subacute prostatitis and then repeat the PSA value in a month.  The hemoglobin A1c is 9.8 suggesting very poor blood sugar control..    I will call over a prescription for the antibiotic and would like to see him in 3 weeks.    With respect to his elevated hemoglobin A1c, I anticipate that being back on his medication should help offset of these results.    Feel free to contact me via the office or My Chart if you have any questions regarding the above.

## 2020-01-02 NOTE — TELEPHONE ENCOUNTER
----- Message from Raymond Sierra DO sent at 1/2/2020 10:23 AM CST -----  Dear Fredy, your recent test results are attached.  The thyroid is well adjusted.  Cholesterol is somewhat elevated with LDL of 152.  Sugar is elevated at 255 but the kidney function is normal.  The prostate-specific antigen is elevated.  Would recommend a short course of antibiotic to rule out subacute prostatitis and then repeat the PSA value in a month.  The hemoglobin A1c is 9.8 suggesting very poor blood sugar control..    I will call over a prescription for the antibiotic and would like to see him in 3 weeks.    With respect to his elevated hemoglobin A1c, I anticipate that being back on his medication should help offset of these results.    Feel free to contact me via the office or My Chart if you have any questions regarding the above.

## 2020-01-02 NOTE — TELEPHONE ENCOUNTER
Called and LM for patient to call back. Please relay results below to patient and help schedule appointment. Medication was sent to [facility] in [place] on 1/2/2020.     Michelle Munoz MA

## 2020-01-03 RX ORDER — ATORVASTATIN CALCIUM 20 MG/1
20 TABLET, FILM COATED ORAL DAILY
Qty: 30 TABLET | Refills: 0 | Status: SHIPPED | OUTPATIENT
Start: 2020-01-03 | End: 2020-09-14

## 2020-01-03 NOTE — TELEPHONE ENCOUNTER
Pt called and results relayed to him. Pt did not want to schedule appt at this time.Pt is wondering if he should still be taking metformin, please advise.

## 2020-01-03 NOTE — TELEPHONE ENCOUNTER
"Patient is going to  medications from the pharmacy. He is NOT going to  the statin that was prescribed, he does not need it and he is not going to start taking it again.  He is going to see the orthopedic surgeon to get his hip replaced before he does ANYTHING else. He declined making an appointment with Dr Sierra at this time, explained to him that he needs to be see before he receives a refill of his medication and he stated that \"he doesn't understand why it can't be refilled for a year, he was off of it for 4 months and that is why his A1C is so high\".  He requests to have his lab work ordered prior to any appointments to that he can discuss the results with the doctor at the time of his scheduled visit and not have to wait or come back in.  I explained that he needed to be seen to make sure the medication was working properly for him before Dr Sierra would give him further refills and he stressed again the \"he is getting his hip replaced before he does anything else\", and he declined again to make an appointment.    Michelle PARMARO/  "

## 2020-01-03 NOTE — TELEPHONE ENCOUNTER
The patient should continue the metformin.  Would also recommend continuation of the ACE inhibitor, a statin, and aspirin.    Would like to see him in 1 month.    Medications have been called to his pharmacy.    Mariela

## 2020-01-13 ENCOUNTER — OFFICE VISIT (OUTPATIENT)
Dept: ORTHOPEDICS | Facility: CLINIC | Age: 60
End: 2020-01-13
Payer: COMMERCIAL

## 2020-01-13 VITALS
BODY MASS INDEX: 36.26 KG/M2 | HEIGHT: 71 IN | DIASTOLIC BLOOD PRESSURE: 80 MMHG | SYSTOLIC BLOOD PRESSURE: 124 MMHG | WEIGHT: 259 LBS

## 2020-01-13 DIAGNOSIS — M16.11 PRIMARY OSTEOARTHRITIS OF RIGHT HIP: Primary | ICD-10-CM

## 2020-01-13 PROCEDURE — 99213 OFFICE O/P EST LOW 20 MIN: CPT | Mod: GW | Performed by: ORTHOPAEDIC SURGERY

## 2020-01-13 ASSESSMENT — MIFFLIN-ST. JEOR: SCORE: 1999.01

## 2020-01-13 ASSESSMENT — PAIN SCALES - GENERAL: PAINLEVEL: MILD PAIN (2)

## 2020-01-13 NOTE — LETTER
"    2020         RE: Fredy Clark  [address] [facility] [address]        Dear Colleague,    Thank you for referring your patient, Fredy Clark, to the [facility]. Please see a copy of my visit note below.    Office Visit-Follow up    Chief Complaint: Fredy Clark is a 59 year old male who is being seen for   Chief Complaint   Patient presents with     RECHECK     right hip primary osteoarthritis       History of Present Illness:   Today's visit  Returns to clinic. The hip continues to worsen.  [place]ruggling [place] and [place]. Walking getting worse. Using a cane and limping.    His last A1c was 9.8 from 19. He had stopped metformin for quite a while.  Starting again.   Also since the last visit has not seen his cardiologist again. Not taking the Eliquis. Instead taking a full strength aspirin daily on his own decision instead of the Eliquis.  He does feel \"my blood is thin enough\".  2/15/19  worsening.  Similar to his contralateral side that he underwent a total hip for.  Pain is waking him at night.  His pain at rest.  He has difficulty putting on his socks because of his pain.  He is taking occasional Aleve.  Rest activity modification.      Social History     Occupational History     Not on file   Tobacco Use     Smoking status: Former Smoker     Packs/day: 2.00     Years: 20.00     Pack years: 40.00     Types: Cigarettes     Last attempt to quit: 1999     Years since quittin.6     Smokeless tobacco: Former User   Substance and Sexual Activity     Alcohol use: Yes     Alcohol/week: 0.0 standard drinks     Comment: very little     Drug use: No     Sexual activity: Not Currently       REVIEW OF SYSTEMS  General: negative for, night sweats, dizziness, fatigue  Resp: No shortness of breath and no cough  CV: negative for chest pain, syncope or near-syncope  GI: negative for nausea, vomiting and diarrhea  : negative for dysuria and " "hematuria  Musculoskeletal: as above  Neurologic: negative for syncope   Hematologic: negative for bleeding disorder    Physical Exam:  Vitals: /80   Ht 1.791 m (5' 10.5\")   Wt 117.5 kg (259 lb)   BMI 36.64 kg/m     BMI= Body mass index is 36.64 kg/m .Constitutional: healthy, alert and no acute distress   Psychiatric: mentation appears normal and affect normal/bright  NEURO: no focal deficits  RESP: Normal with easy respirations and no use of accessory muscles to breathe, no audible wheezing or retractions  CV: RLE: no edema  SKIN: No erythema, rashes, excoriation, or breakdown. No evidence of infection.   JOINT/EXTREMITIES:right hip: Flexion to approximately 95 degrees.  0 internal rotation. A couple degrees most of external rotation. Mild pain with motion.  No focal areas of tenderness.   GAIT: antalgic  with cane        Diagnostic Modalities:  None today.  Previous imaging reviewed.  Independent visualization of the images was performed.      Impression: right hip primary osteoarthritis  Uncontrolled Diabetes    Plan:  All of the above pertinent physical exam and imaging modalities findings was reviewed with Fredy.  He has bone on bone osteoarthritis of the hip and it is impacting quality of life. unfortunately with an A1c of 9.8 and not following cardiologist advise would not be a surgical candidate at this time. Recommended for patient to get diabetes back under control and follow-up with cardiologist prior to any further discussion of surgery.     Return to clinic PRN, or sooner as needed for changes.  Re-x-ray on return: No    Scribed by:  SHALONDA Gandara, CNP  8:09 AM  1/13/2020  The information in this document, created by a scribe for me, accurately reflects the services I personally performed and the decisions made by me. I have reviewed and approved this document for accuracy    Dejon Luis, DO         Again, thank you for allowing me to participate in the care of your patient.  "       Sincerely,        Dejon Luis, DO

## 2020-01-13 NOTE — PROGRESS NOTES
"Office Visit-Follow up    Chief Complaint: Fredy Clark is a 59 year old male who is being seen for   Chief Complaint   Patient presents with     RECHECK     right hip primary osteoarthritis       History of Present Illness:   Today's visit  Returns to clinic. The hip continues to worsen.  Struggling with [place] and driving. Walking getting worse. Using a cane and limping.    His last A1c was 9.8 from 19. He had stopped metformin for quite a while.  Starting again.   Also since the last visit has not seen his cardiologist again. Not taking the Eliquis. Instead taking a full strength aspirin daily on his own decision instead of the Eliquis.  He does feel \"my blood is thin enough\".  2/15/19  worsening.  Similar to his contralateral side that he underwent a total hip for.  Pain is waking him at night.  His pain at rest.  He has difficulty putting on his socks because of his pain.  He is taking occasional Aleve.  Rest activity modification.      Social History     Occupational History     Not on file   Tobacco Use     Smoking status: Former Smoker     Packs/day: 2.00     Years: 20.00     Pack years: 40.00     Types: Cigarettes     Last attempt to quit: 1999     Years since quittin.6     Smokeless tobacco: Former User   Substance and Sexual Activity     Alcohol use: Yes     Alcohol/week: 0.0 standard drinks     Comment: very little     Drug use: No     Sexual activity: Not Currently       REVIEW OF SYSTEMS  General: negative for, night sweats, dizziness, fatigue  Resp: No shortness of breath and no cough  CV: negative for chest pain, syncope or near-syncope  GI: negative for nausea, vomiting and diarrhea  : negative for dysuria and hematuria  Musculoskeletal: as above  Neurologic: negative for syncope   Hematologic: negative for bleeding disorder    Physical Exam:  Vitals: /80   Ht 1.791 m (5' 10.5\")   Wt 117.5 kg (259 lb)   BMI 36.64 kg/m    BMI= Body mass index is 36.64 " kg/m .Constitutional: healthy, alert and no acute distress   Psychiatric: mentation appears normal and affect normal/bright  NEURO: no focal deficits  RESP: Normal with easy respirations and no use of accessory muscles to breathe, no audible wheezing or retractions  CV: RLE: no edema  SKIN: No erythema, rashes, excoriation, or breakdown. No evidence of infection.   JOINT/EXTREMITIES:right hip: Flexion to approximately 95 degrees.  0 internal rotation. A couple degrees most of external rotation. Mild pain with motion.  No focal areas of tenderness.   GAIT: antalgic with cane        Diagnostic Modalities:  None today.  Previous imaging reviewed.  Independent visualization of the images was performed.      Impression: right hip primary osteoarthritis  Uncontrolled Diabetes    Plan:  All of the above pertinent physical exam and imaging modalities findings was reviewed with Fredy.  He has bone on bone osteoarthritis of the hip and it is impacting quality of life. unfortunately with an A1c of 9.8 and not following cardiologist advise would not be a surgical candidate at this time. Recommended for patient to get diabetes back under control and follow-up with cardiologist prior to any further discussion of surgery.     Return to clinic PRN, or sooner as needed for changes.  Re-x-ray on return: No    Scribed by:  SHALONDA Gandara, CNP  8:09 AM  1/13/2020  The information in this document, created by a scribe for me, accurately reflects the services I personally performed and the decisions made by me. I have reviewed and approved this document for accuracy    Dejon Luis DO

## 2020-01-19 DIAGNOSIS — E11.9 TYPE 2 DIABETES MELLITUS WITHOUT COMPLICATION, WITHOUT LONG-TERM CURRENT USE OF INSULIN (H): ICD-10-CM

## 2020-01-20 NOTE — TELEPHONE ENCOUNTER
"Requested Prescriptions   Pending Prescriptions Disp Refills     metFORMIN (GLUCOPHAGE) 1000 MG tablet [Pharmacy Med Name: METFORMIN HCL 1000MG TABS] 30 tablet 0     Sig: TAKE ONE TABLET BY MOUTH TWICE A DAY WITH MEALS   Last Written Prescription Date:  1/3/2020  Last Fill Quantity: 120,  # refills: 0   Last office visit: 12/23/2019 with prescribing provider:  12/23/19   Future Office Visit:        Biguanide Agents Passed - 1/19/2020  9:13 AM        Passed - Blood pressure less than 140/90 in past 6 months     BP Readings from Last 3 Encounters:   01/13/20 124/80   12/23/19 118/68   02/15/19 166/85                 Passed - Patient has documented LDL within the past 12 mos.     Recent Labs   Lab Test 12/23/19  0749   *             Passed - Patient has had a Microalbumin in the past 15 mos.     Recent Labs   Lab Test 12/23/19  0745   MICROL 23   UMALCR 13.77             Passed - Patient is age 10 or older        Passed - Patient has documented A1c within the specified period of time.     If HgbA1C is 8 or greater, it needs to be on file within the past 3 months.  If less than 8, must be on file within the past 6 months.     Recent Labs   Lab Test 12/23/19  0749   A1C 9.8*             Passed - Patient's CR is NOT>1.4 OR Patient's EGFR is NOT<45 within past 12 mos.     Recent Labs   Lab Test 12/23/19  0749   GFRESTIMATED 85   GFRESTBLACK >90       Recent Labs   Lab Test 12/23/19  0749   CR 0.96             Passed - Patient does NOT have a diagnosis of CHF.        Passed - Medication is active on med list        Passed - Recent (6 mo) or future (30 days) visit within the authorizing provider's specialty     Patient had office visit in the last 6 months or has a visit in the next 30 days with authorizing provider or within the authorizing provider's specialty.  See \"Patient Info\" tab in inbasket, or \"Choose Columns\" in Meds & Orders section of the refill encounter.            "

## 2020-01-20 NOTE — TELEPHONE ENCOUNTER
Routing refill request to provider for review/approval because:  Labs out of range:  LDL    JESIKA AvelarN, RN  [facility]

## 2020-07-08 ENCOUNTER — TELEPHONE (OUTPATIENT)
Dept: FAMILY MEDICINE | Facility: OTHER | Age: 60
End: 2020-07-08

## 2020-07-08 DIAGNOSIS — E11.9 TYPE 2 DIABETES MELLITUS WITHOUT COMPLICATION, WITHOUT LONG-TERM CURRENT USE OF INSULIN (H): Primary | ICD-10-CM

## 2020-07-08 DIAGNOSIS — E78.5 HYPERLIPIDEMIA LDL GOAL <100: ICD-10-CM

## 2020-07-08 DIAGNOSIS — Z12.5 SCREENING FOR PROSTATE CANCER: ICD-10-CM

## 2020-07-08 NOTE — TELEPHONE ENCOUNTER
Orders have been placed.  Please have the patient set up an appointment with the laboratory at a time of his convenience.    Thank you.    Mariela

## 2020-07-08 NOTE — TELEPHONE ENCOUNTER
Reason for Call: Request for an order or referral:    Order or referral being requested: lab     Date needed: at your convenience    Has the patient been seen by the PCP for this problem? YES    Additional comments: requesting orders for a HA1C and lipids     Phone number Patient can be reached at:  Cell number on file:    Telephone Information:   Mobile [phone]       Best Time:  Any     Can we leave a detailed message on this number?  YES    Call taken on 7/8/2020 at 8:36 AM by Romy Sherman

## 2020-07-15 DIAGNOSIS — E11.9 TYPE 2 DIABETES MELLITUS WITHOUT COMPLICATION, WITHOUT LONG-TERM CURRENT USE OF INSULIN (H): ICD-10-CM

## 2020-07-15 NOTE — TELEPHONE ENCOUNTER
Routing refill request to provider for review/approval because:  Labs not current:  A1C  Per protocol, patient needs diabetic follow up visit.     Adelia Vieyra RN

## 2020-08-03 DIAGNOSIS — E11.9 TYPE 2 DIABETES MELLITUS WITHOUT COMPLICATION, WITHOUT LONG-TERM CURRENT USE OF INSULIN (H): ICD-10-CM

## 2020-08-03 DIAGNOSIS — E78.5 HYPERLIPIDEMIA LDL GOAL <100: ICD-10-CM

## 2020-08-03 DIAGNOSIS — Z12.5 SCREENING FOR PROSTATE CANCER: ICD-10-CM

## 2020-08-03 LAB
ALBUMIN SERPL-MCNC: 3.7 G/DL (ref 3.4–5)
ALP SERPL-CCNC: 80 U/L (ref 40–150)
ALT SERPL W P-5'-P-CCNC: 18 U/L (ref 0–70)
ANION GAP SERPL CALCULATED.3IONS-SCNC: 4 MMOL/L (ref 3–14)
AST SERPL W P-5'-P-CCNC: 10 U/L (ref 0–45)
BILIRUB DIRECT SERPL-MCNC: 0.1 MG/DL (ref 0–0.2)
BILIRUB SERPL-MCNC: 0.5 MG/DL (ref 0.2–1.3)
BUN SERPL-MCNC: 18 MG/DL (ref 7–30)
CALCIUM SERPL-MCNC: 9.2 MG/DL (ref 8.5–10.1)
CHLORIDE SERPL-SCNC: 105 MMOL/L (ref 94–109)
CHOLEST SERPL-MCNC: 193 MG/DL
CO2 SERPL-SCNC: 29 MMOL/L (ref 20–32)
CREAT SERPL-MCNC: 0.69 MG/DL (ref 0.66–1.25)
GFR SERPL CREATININE-BSD FRML MDRD: >90 ML/MIN/{1.73_M2}
GLUCOSE SERPL-MCNC: 177 MG/DL (ref 70–99)
HDLC SERPL-MCNC: 35 MG/DL
LDLC SERPL CALC-MCNC: 125 MG/DL
NONHDLC SERPL-MCNC: 158 MG/DL
POTASSIUM SERPL-SCNC: 3.7 MMOL/L (ref 3.4–5.3)
PROT SERPL-MCNC: 7.4 G/DL (ref 6.8–8.8)
PSA SERPL-ACNC: 6.59 UG/L (ref 0–4)
SODIUM SERPL-SCNC: 138 MMOL/L (ref 133–144)
TRIGL SERPL-MCNC: 165 MG/DL

## 2020-08-03 PROCEDURE — 80048 BASIC METABOLIC PNL TOTAL CA: CPT | Performed by: INTERNAL MEDICINE

## 2020-08-03 PROCEDURE — 80061 LIPID PANEL: CPT | Performed by: INTERNAL MEDICINE

## 2020-08-03 PROCEDURE — 36415 COLL VENOUS BLD VENIPUNCTURE: CPT | Performed by: INTERNAL MEDICINE

## 2020-08-03 PROCEDURE — 83036 HEMOGLOBIN GLYCOSYLATED A1C: CPT | Mod: QW | Performed by: INTERNAL MEDICINE

## 2020-08-03 PROCEDURE — 80076 HEPATIC FUNCTION PANEL: CPT | Performed by: INTERNAL MEDICINE

## 2020-08-03 PROCEDURE — G0103 PSA SCREENING: HCPCS | Performed by: INTERNAL MEDICINE

## 2020-08-03 NOTE — LETTER
August 6, 2020      Fredy Clark  [address] [facility] [address]        Dear ,    We are writing to inform you of your test results.    The A1c is 7.5.  This suggests marked improvement in the blood sugar control.   The prostate-specific antigen is down from the previous 8.09 down to 6.59 suggesting no progression toward prostate cancer.   The hemoglobin A1c is liver tests are normal.   The cholesterol is slightly elevated with an LDL of 125 though this is markedly improved over the previous 152.   Chemistry panel shows a blood sugar 177 and normal kidney function.     You will be contacted with any outstanding results as they are available.   Feel free to contact me via the office or My Chart if you have any questions regarding the above.     Resulted Orders   **Hepatic panel FUTURE 2mo   Result Value Ref Range    Bilirubin Direct 0.1 0.0 - 0.2 mg/dL    Bilirubin Total 0.5 0.2 - 1.3 mg/dL    Albumin 3.7 3.4 - 5.0 g/dL    Protein Total 7.4 6.8 - 8.8 g/dL    Alkaline Phosphatase 80 40 - 150 U/L    ALT 18 0 - 70 U/L    AST 10 0 - 45 U/L   **A1C FUTURE anytime   Result Value Ref Range    Hemoglobin A1C 7.5 (H) 0 - 5.6 %      Comment:      Results confirmed by repeat test  Normal <5.7% Prediabetes 5.7-6.4%  Diabetes 6.5% or higher - adopted from ADA   consensus guidelines.  CORRECTED ON 08/06 AT 0836: PREVIOUSLY REPORTED AS Results confirmed by repeat   test     Lipid panel reflex to direct LDL Fasting   Result Value Ref Range    Cholesterol 193 <200 mg/dL    Triglycerides 165 (H) <150 mg/dL      Comment:      Borderline high:  150-199 mg/dl  High:             200-499 mg/dl  Very high:       >499 mg/dl      HDL Cholesterol 35 (L) >39 mg/dL    LDL Cholesterol Calculated 125 (H) <100 mg/dL      Comment:      Above desirable:  100-129 mg/dl  Borderline High:  130-159 mg/dL  High:             160-189 mg/dL  Very high:       >189 mg/dl      Non HDL Cholesterol 158 (H) <130 mg/dL      Comment:      Above  Desirable:  130-159 mg/dl  Borderline high:  160-189 mg/dl  High:             190-219 mg/dl  Very high:       >219 mg/dl     **Basic metabolic panel FUTURE anytime   Result Value Ref Range    Sodium 138 133 - 144 mmol/L    Potassium 3.7 3.4 - 5.3 mmol/L    Chloride 105 94 - 109 mmol/L    Carbon Dioxide 29 20 - 32 mmol/L    Anion Gap 4 3 - 14 mmol/L    Glucose 177 (H) 70 - 99 mg/dL    Urea Nitrogen 18 7 - 30 mg/dL    Creatinine 0.69 0.66 - 1.25 mg/dL    GFR Estimate >90 >60 mL/min/[1.73_m2]      Comment:      Non  GFR Calc  Starting 12/18/2018, serum creatinine based estimated GFR (eGFR) will be   calculated using the Chronic Kidney Disease Epidemiology Collaboration   (CKD-EPI) equation.      GFR Estimate If Black >90 >60 mL/min/[1.73_m2]      Comment:       GFR Calc  Starting 12/18/2018, serum creatinine based estimated GFR (eGFR) will be   calculated using the Chronic Kidney Disease Epidemiology Collaboration   (CKD-EPI) equation.      Calcium 9.2 8.5 - 10.1 mg/dL   **Prostate spec antigen screen FUTURE anytime   Result Value Ref Range    PSA 6.59 (H) 0 - 4 ug/L      Comment:      Assay Method:  Chemiluminescence using Siemens Vista analyzer       If you have any questions or concerns, please call the clinic at the number listed above.       Sincerely,        Raymond Sierra DO

## 2020-08-06 LAB — HBA1C MFR BLD: 7.5 % (ref 0–5.6)

## 2020-08-06 NOTE — RESULT ENCOUNTER NOTE
Dear Fredy, your recent test results are attached.  The A1c is 7.5.  This suggests marked improvement in the blood sugar control.  The prostate-specific antigen is down from the previous 8.09 down to 6.59 suggesting no progression toward prostate cancer.  The hemoglobin A1c is liver tests are normal.  The cholesterol is slightly elevated with an LDL of 125 though this is markedly improved over the previous 152.  Chemistry panel shows a blood sugar 177 and normal kidney function.    You will be contacted with any outstanding results as they are available.  Feel free to contact me via the office or My Chart if you have any questions regarding the above.

## 2020-09-14 ENCOUNTER — OFFICE VISIT (OUTPATIENT)
Dept: ORTHOPEDICS | Facility: CLINIC | Age: 60
End: 2020-09-14
Payer: COMMERCIAL

## 2020-09-14 ENCOUNTER — ANCILLARY PROCEDURE (OUTPATIENT)
Dept: GENERAL RADIOLOGY | Facility: CLINIC | Age: 60
End: 2020-09-14
Attending: ORTHOPAEDIC SURGERY
Payer: COMMERCIAL

## 2020-09-14 VITALS
HEIGHT: 71 IN | BODY MASS INDEX: 36.31 KG/M2 | WEIGHT: 259.4 LBS | SYSTOLIC BLOOD PRESSURE: 152 MMHG | DIASTOLIC BLOOD PRESSURE: 80 MMHG

## 2020-09-14 DIAGNOSIS — M16.11 PRIMARY OSTEOARTHRITIS OF RIGHT HIP: ICD-10-CM

## 2020-09-14 DIAGNOSIS — M16.11 PRIMARY OSTEOARTHRITIS OF RIGHT HIP: Primary | ICD-10-CM

## 2020-09-14 PROCEDURE — 99214 OFFICE O/P EST MOD 30 MIN: CPT | Mod: GW | Performed by: ORTHOPAEDIC SURGERY

## 2020-09-14 PROCEDURE — 73502 X-RAY EXAM HIP UNI 2-3 VIEWS: CPT | Mod: TC

## 2020-09-14 ASSESSMENT — MIFFLIN-ST. JEOR: SCORE: 1995.82

## 2020-09-14 ASSESSMENT — PAIN SCALES - GENERAL: PAINLEVEL: MILD PAIN (2)

## 2020-09-14 NOTE — PROGRESS NOTES
Fredy to follow up with Primary Care provider regarding elevated blood pressure.  Estefani/MADELEINE

## 2020-09-14 NOTE — PROGRESS NOTES
"Office Visit-Follow up    Chief Complaint: Fredy Clark is a 60 year old male who is being seen for   Chief Complaint   Patient presents with     RECHECK     right hip primary osteoarthritis       History of Present Illness:   Today's visit  Since his last visit he is improved his diabetic/blood sugar control with a hemoglobin A1c of 7.5.  He is return back to his metformin.  Pain continues to be severe lateral hip.  Nonradiating.  Pain at rest.  Pain that wakes him at night.  Pain that causes movement.  He is now attempted rest, activity modification, cane, Tylenol and occasional Aleve with no improvement.  2020 visit:  Returns to clinic. The hip continues to worsen.  [place]ruggling [place] and [place]. Walking getting worse. Using a cane and limping.    His last A1c was 9.8 from 19. He had stopped metformin for quite a while.  Starting again.   Also since the last visit has not seen his cardiologist again. Not taking the Eliquis. Instead taking a full strength aspirin daily on his own decision instead of the Eliquis.  He does feel \"my blood is thin enough\".  2/15/19  worsening.  Similar to his contralateral side that he underwent a total hip for.  Pain is waking him at night.  His pain at rest.  He has difficulty putting on his socks because of his pain.  He is taking occasional Aleve.  Rest activity modification.         Social History     Occupational History     Not on file   Tobacco Use     Smoking status: Former Smoker     Packs/day: 2.00     Years: 20.00     Pack years: 40.00     Types: Cigarettes     Last attempt to quit: 1999     Years since quittin.3     Smokeless tobacco: Former User   Substance and Sexual Activity     Alcohol use: Yes     Alcohol/week: 0.0 standard drinks     Comment: very little     Drug use: No     Sexual activity: Not Currently       REVIEW OF SYSTEMS  General: negative for, night sweats, dizziness, fatigue  Resp: No shortness of breath and no " "cough  CV: negative for chest pain, syncope or near-syncope  GI: negative for nausea, vomiting and diarrhea  : negative for dysuria and hematuria  Musculoskeletal: as above  Neurologic: negative for syncope   Hematologic: negative for bleeding disorder    Physical Exam:  Vitals: BP (!) 152/80   Ht 1.791 m (5' 10.5\")   Wt 117.7 kg (259 lb 6.4 oz)   BMI 36.69 kg/m    BMI= Body mass index is 36.69 kg/m .  Constitutional: healthy, alert and no acute distress   Psychiatric: mentation appears normal and affect normal/bright  NEURO: no focal deficits  RESP: Normal with easy respirations and no use of accessory muscles to breathe, no audible wheezing or retractions  CV: RLE: no edema         Regular rate and rhythm by palpation  SKIN: No erythema, rashes, excoriation, or breakdown. No evidence of infection.   JOINT/EXTREMITIES:right hip: External rotation contracture.  0 degrees internal rotation.  Hip flexion to approximately 95 degrees.  Negative straight leg.  No focal atrophy.  No focus of tenderness.  No peripheral lymphedema  GAIT: not tested             Diagnostic Modalities:  right hip X-ray: No fractures or dislocations.  Complete loss of articular space right hip with osteophyte formation.  Subchondral cystic changes throughout the femoral head and superior acetabulum.  Independent visualization of the images was performed.      Impression: right hip primary osteoarthritis, questionable AVN    Plan:  All of the above pertinent physical exam and imaging modalities findings was reviewed with Fredy.    The patient has attempted conservative treatments that include NSAIDs, Tylenol, activity modifications, rest yet still continues to have significant issues. These issues include pain at rest, increased pain with activity, pain that wakes at night, gait disturbances, loss of motion of the joint. Secondary to these reasons I have offered surgery in the form of a right total hip replacement.    Risks, benefits, " complications, alternatives, limitations, and post operative course were discussed. Risks including infection (requiring long-term antibiotics and repeat surgeries), implant loosening (requiring revision surgery), heart attacks, strokes, bleeding (possibly requiring blood transfusion), scars, leg length differences (causing a limp), instability and dislocations, fractures, blood clots the legs and lungs, nerve injury (possibly leading to foot drop).   Postoperative course was discussed as far as limitations and expected recovery times. We also reviewed my anticipated surgical approach.  It was also discussed that after surgery there is a need for blood thinners to prevent blood clots, but even when being treated it is possible to develop a blood clot. Anticipate being hospitalized 1 day and subsequently either discharged home or to a rehabilitation facility. If discharge home from the hospital expect [facility] physical therapy for about 2 weeks and then transition to going to a physical therapy location for more aggressive therapy. Determinations of this will be based on progress with physical therapy while in the hospital. All questions were answered. The patient agrees to proceed with surgery.  Past medical and surgical history was reviewed. It is positive for DM (A1C 7.5). Mr. Clark will need a pre-operative medical evaluation and clearance by a primary care provider. We will obtain a CBC as well as the appropriate radiographs prior to surgery. I will leave it to the discretion of the primary care provider for additional pre-operative testing.   Anticipate next day discharge.  We will see him 1 week prior.    Return to clinic 14 days post-operatively. , or sooner as needed for changes.  Re-x-ray on return: No at 1 week prior, will need x-rays at 14 days postop    Jeremy Luis D.O.

## 2020-09-14 NOTE — LETTER
"    2020         RE: Fredy Clark  [address] [facility] [address]        Dear Colleague,    Thank you for referring your patient, Fredy Clark, to the [facility]. Please see a copy of my visit note below.    Office Visit-Follow up    Chief Complaint: Fredy Clark is a 60 year old male who is being seen for   Chief Complaint   Patient presents with     RECHECK     right hip primary osteoarthritis       History of Present Illness:   Today's visit  Since his last visit he is improved his diabetic/blood sugar control with a hemoglobin A1c of 7.5.  He is return back to his metformin.  Pain continues to be severe lateral hip.  Nonradiating.  Pain at rest.  Pain that wakes him at night.  Pain that causes movement.  He is now attempted rest, activity modification, cane, Tylenol and occasional Aleve with no improvement.  2020 visit:  Returns to clinic. The hip continues to worsen.  Struggling with [place] and driving. Walking getting worse. Using a cane and limping.    His last A1c was 9.8 from 19. He had stopped metformin for quite a while.  Starting again.   Also since the last visit has not seen his cardiologist again. Not taking the Eliquis. Instead taking a full strength aspirin daily on his own decision instead of the Eliquis.  He does feel \"my blood is thin enough\".  2/15/19  worsening.  Similar to his contralateral side that he underwent a total hip for.  Pain is waking him at night.  His pain at rest.  He has difficulty putting on his socks because of his pain.  He is taking occasional Aleve.  Rest activity modification.         Social History     Occupational History     Not on file   Tobacco Use     Smoking status: Former Smoker     Packs/day: 2.00     Years: 20.00     Pack years: 40.00     Types: Cigarettes     Last attempt to quit: 1999     Years since quittin.3     Smokeless tobacco: Former User   Substance and Sexual Activity     Alcohol use: " "Yes     Alcohol/week: 0.0 standard drinks     Comment: very little     Drug use: No     Sexual activity: Not Currently       REVIEW OF SYSTEMS  General: negative for, night sweats, dizziness, fatigue  Resp: No shortness of breath and no cough  CV: negative for chest pain, syncope or near-syncope  GI: negative for nausea, vomiting and diarrhea  : negative for dysuria and hematuria  Musculoskeletal: as above  Neurologic: negative for syncope   Hematologic: negative for bleeding disorder    Physical Exam:  Vitals: BP (!) 152/80   Ht 1.791 m (5' 10.5\")   Wt 117.7 kg (259 lb 6.4 oz)   BMI 36.69 kg/m    BMI= Body mass index is 36.69 kg/m .  Constitutional: healthy, alert and no acute distress   Psychiatric: mentation appears normal and affect normal/bright  NEURO: no focal deficits  RESP: Normal with easy respirations and no use of accessory muscles to breathe, no audible wheezing or retractions  CV: RLE: no edema         Regular rate and rhythm by palpation  SKIN: No erythema, rashes, excoriation, or breakdown. No evidence of infection.   JOINT/EXTREMITIES:right hip: External rotation contracture.  0 degrees internal rotation.  Hip flexion to approximately 95 degrees.  Negative straight leg.  No focal atrophy.  No focus of tenderness.  No peripheral lymphedema  GAIT: not tested             Diagnostic Modalities:  right hip X-ray: No fractures or dislocations.  Complete loss of articular space right hip with osteophyte formation.  Subchondral cystic changes throughout the femoral head and superior acetabulum.  Independent visualization of the images was performed.      Impression: right hip primary osteoarthritis, questionable AVN    Plan:  All of the above pertinent physical exam and imaging modalities findings was reviewed with Fredy.    The patient has attempted conservative treatments that include NSAIDs, Tylenol, activity modifications, rest yet still continues to have significant issues. These issues include " pain at rest, increased pain with activity, pain that wakes at night, gait disturbances, loss of motion of the joint. Secondary to these reasons I have offered surgery in the form of a right total hip replacement.    Risks, benefits, complications, alternatives, limitations, and post operative course were discussed. Risks including infection (requiring long-term antibiotics and repeat surgeries), implant loosening (requiring revision surgery), heart attacks, strokes, bleeding (possibly requiring blood transfusion), scars, leg length differences (causing a limp), instability and dislocations, fractures, blood clots the legs and lungs, nerve injury (possibly leading to foot drop).   Postoperative course was discussed as far as limitations and expected recovery times. We also reviewed my anticipated surgical approach.  It was also discussed that after surgery there is a need for blood thinners to prevent blood clots, but even when being treated it is possible to develop a blood clot. Anticipate being hospitalized 1 day and subsequently either discharged home or to a rehabilitation facility. If discharge home from the hospital expect [facility] physical therapy for about 2 weeks and then transition to going to a physical therapy location for more aggressive therapy. Determinations of this will be based on progress with physical therapy while in the hospital. All questions were answered. The patient agrees to proceed with surgery.  Past medical and surgical history was reviewed. It is positive for DM (A1C 7.5). Mr. Clark will need a pre-operative medical evaluation and clearance by a primary care provider. We will obtain a CBC as well as the appropriate radiographs prior to surgery. I will leave it to the discretion of the primary care provider for additional pre-operative testing.   Anticipate next day discharge.  We will see him 1 week prior.    Return to clinic 14 days post-operatively. , or sooner as needed for  changes.  Re-x-ray on return: No at 1 week prior, will need x-rays at 14 days postop    ARLEN Graham to follow up with Primary Care provider regarding elevated blood pressure.  Estefani/MADELEINE     Again, thank you for allowing me to participate in the care of your patient.        Sincerely,        Dejon Luis, DO

## 2020-09-18 ENCOUNTER — TELEPHONE (OUTPATIENT)
Dept: ORTHOPEDICS | Facility: OTHER | Age: 60
End: 2020-09-18

## 2020-09-18 DIAGNOSIS — Z01.818 PREOPERATIVE EXAMINATION: ICD-10-CM

## 2020-09-18 DIAGNOSIS — M16.11 PRIMARY OSTEOARTHRITIS OF RIGHT HIP: Primary | ICD-10-CM

## 2020-09-20 DIAGNOSIS — I10 BENIGN ESSENTIAL HYPERTENSION: ICD-10-CM

## 2020-09-22 RX ORDER — LISINOPRIL AND HYDROCHLOROTHIAZIDE 20; 25 MG/1; MG/1
TABLET ORAL
Qty: 180 TABLET | Refills: 1 | OUTPATIENT
Start: 2020-09-22

## 2020-09-22 NOTE — TELEPHONE ENCOUNTER
Prescription was sent 6/15/2020 for #180 with 1 refill.  Pharmacy notified via E-Prescribe refusal.     JESIKA AvelarN, RN  [facility]

## 2020-09-23 ENCOUNTER — HOSPITAL ENCOUNTER (OUTPATIENT)
Facility: CLINIC | Age: 60
End: 2020-09-23
Attending: ORTHOPAEDIC SURGERY | Admitting: ORTHOPAEDIC SURGERY
Payer: COMMERCIAL

## 2020-09-23 DIAGNOSIS — Z11.59 ENCOUNTER FOR SCREENING FOR OTHER VIRAL DISEASES: Primary | ICD-10-CM

## 2020-09-24 NOTE — TELEPHONE ENCOUNTER
Type of surgery: right total hip replacement   Location of surgery: [facility]   Date of surgery: 10/20/20  Surgeon: Dr. Luis  Pre-Op Appt Date: 10/9/20  Post-Op Appt Date: 11/4/20   Packet sent out: Surgery packet mailed to patient's home address.   Pre-cert/Authorization completed: NA  Date: 9/24/2020    -cbc ordered    Nany Londono  Surgery Scheduler

## 2020-10-05 RX ORDER — CEFAZOLIN SODIUM 2 G/100ML
2 INJECTION, SOLUTION INTRAVENOUS
Status: CANCELLED | OUTPATIENT
Start: 2020-10-05

## 2020-10-05 RX ORDER — TRANEXAMIC ACID 650 MG/1
1950 TABLET ORAL ONCE
Status: CANCELLED | OUTPATIENT
Start: 2020-10-05 | End: 2020-10-05

## 2020-10-05 RX ORDER — CEFAZOLIN SODIUM 1 G/3ML
1 INJECTION, POWDER, FOR SOLUTION INTRAMUSCULAR; INTRAVENOUS SEE ADMIN INSTRUCTIONS
Status: CANCELLED | OUTPATIENT
Start: 2020-10-05

## 2020-10-09 ENCOUNTER — OFFICE VISIT (OUTPATIENT)
Dept: INTERNAL MEDICINE | Facility: CLINIC | Age: 60
End: 2020-10-09
Payer: COMMERCIAL

## 2020-10-09 VITALS
RESPIRATION RATE: 22 BRPM | WEIGHT: 266 LBS | BODY MASS INDEX: 37.63 KG/M2 | HEART RATE: 99 BPM | OXYGEN SATURATION: 92 % | TEMPERATURE: 96.8 F | SYSTOLIC BLOOD PRESSURE: 148 MMHG | DIASTOLIC BLOOD PRESSURE: 86 MMHG

## 2020-10-09 DIAGNOSIS — E78.5 HYPERLIPIDEMIA LDL GOAL <100: ICD-10-CM

## 2020-10-09 DIAGNOSIS — I25.5 ISCHEMIC CARDIOMYOPATHY: ICD-10-CM

## 2020-10-09 DIAGNOSIS — E11.9 TYPE 2 DIABETES MELLITUS WITHOUT COMPLICATION, WITHOUT LONG-TERM CURRENT USE OF INSULIN (H): ICD-10-CM

## 2020-10-09 DIAGNOSIS — Z91.199 PERSONAL HISTORY OF NONCOMPLIANCE WITH MEDICAL TREATMENT, PRESENTING HAZARDS TO HEALTH: ICD-10-CM

## 2020-10-09 DIAGNOSIS — E66.01 MORBID OBESITY (H): ICD-10-CM

## 2020-10-09 DIAGNOSIS — Z01.818 PREOP GENERAL PHYSICAL EXAM: Primary | ICD-10-CM

## 2020-10-09 DIAGNOSIS — M16.11 PRIMARY OSTEOARTHRITIS OF RIGHT HIP: ICD-10-CM

## 2020-10-09 DIAGNOSIS — Z86.79 HISTORY OF VENTRICULAR TACHYCARDIA: ICD-10-CM

## 2020-10-09 DIAGNOSIS — Z95.1 STATUS POST AORTO-CORONARY ARTERY BYPASS GRAFT: ICD-10-CM

## 2020-10-09 LAB
ALBUMIN UR-MCNC: NEGATIVE MG/DL
APPEARANCE UR: CLEAR
BILIRUB UR QL STRIP: NEGATIVE
COLOR UR AUTO: YELLOW
GLUCOSE UR STRIP-MCNC: 150 MG/DL
HGB UR QL STRIP: NEGATIVE
KETONES UR STRIP-MCNC: NEGATIVE MG/DL
LEUKOCYTE ESTERASE UR QL STRIP: ABNORMAL
MUCOUS THREADS #/AREA URNS LPF: PRESENT /LPF
NITRATE UR QL: NEGATIVE
PH UR STRIP: 6 PH (ref 5–7)
RBC #/AREA URNS AUTO: 1 /HPF (ref 0–2)
SOURCE: ABNORMAL
SP GR UR STRIP: 1.02 (ref 1–1.03)
SQUAMOUS #/AREA URNS AUTO: <1 /HPF (ref 0–1)
UROBILINOGEN UR STRIP-MCNC: 2 MG/DL (ref 0–2)
WBC #/AREA URNS AUTO: 4 /HPF (ref 0–5)

## 2020-10-09 PROCEDURE — 99215 OFFICE O/P EST HI 40 MIN: CPT | Performed by: INTERNAL MEDICINE

## 2020-10-09 PROCEDURE — 93005 ELECTROCARDIOGRAM TRACING: CPT | Performed by: INTERNAL MEDICINE

## 2020-10-09 PROCEDURE — 81001 URINALYSIS AUTO W/SCOPE: CPT | Performed by: INTERNAL MEDICINE

## 2020-10-09 ASSESSMENT — PAIN SCALES - GENERAL: PAINLEVEL: NO PAIN (0)

## 2020-10-09 NOTE — PROGRESS NOTES
[address] [address]  Phone: [phone]  Primary Provider: Raymond Sierra  {FV AMB Performing Provider (Optional):623240}    PREOPERATIVE EVALUATION:  Today's date: 10/9/2020    Fredy Clark is a 60 year old male who presents for a preoperative evaluation.    Surgical Information:  No flowsheet data found.  Fax number for surgical facility: {SURGERY FAX NUMBER:[id number]}  Type of Anesthesia Anticipated: {ANESTHESIA:993434}    Subjective     HPI related to upcoming procedure: ***  No flowsheet data found.  Patient does not have a Health Care Directive or Living Will: {ADVANCE_DIRECTIVE_STATUS:301393}    RX monitoring program (MNPMP) reviewed: {Mnpmpreport:107443}  {Review MNPMP for all patients per [employer] [email or web address]}  {Chronic problem details (Optional):750030}    Review of Systems  {ROS Preop Choices:182929}    Patient Active Problem List    Diagnosis Date Noted     Primary osteoarthritis of right hip 02/15/2019     Priority: Medium     Obesity (BMI 35.0-39.9) with comorbidity (H) 12/17/2018     Priority: Medium     Benign essential hypertension 07/18/2016     Priority: Medium     Status post total hip replacement, left 01/05/2016     Priority: Medium     Obesity, Class II, BMI 35-39.9, with comorbidity 10/26/2015     Priority: Medium     Type 2 diabetes mellitus without complication (H) 10/31/2010     Priority: Medium     Hyperlipidemia LDL goal <100 10/31/2010     Priority: Medium     Cardiovascular disease 03/29/2006     Priority: Medium     Problem list name updated by automated process. Provider to review        Past Medical History:   Diagnosis Date     Coronary atherosclerosis of unspecified type of vessel, native or graft     Coronary artery disease     Obesity, Class II, BMI 35-39.9, with comorbidity 10/26/2015     Obesity, unspecified      Other and unspecified hyperlipidemia      Type II or  unspecified type diabetes mellitus without mention of complication, not stated as uncontrolled      Unspecified essential hypertension      Past Surgical History:   Procedure Laterality Date     ARTHROPLASTY HIP Left 2016    Procedure: ARTHROPLASTY HIP;  Surgeon: Dejon Luis DO;  Location: [facility] OR     C ANESTH,CARDIOVERTER/DEFIB  2001    Implant defibrillator, 2006 - pulse generator change serial # [id number]     C CABG, ARTERY-VEIN, FIVE       HC PPM GENERATOR REMOVAL  06    [facility]- removed Medtronic [id number] replaced with Medtronic Entrust#[id number]     Current Outpatient Medications   Medication Sig Dispense Refill     aspirin (ASA) 325 MG EC tablet Take 325 mg by mouth every 6 hours as needed for moderate pain       CAYENNE 500 MG OR CAPS 1 tab twice daily  0     lisinopril-hydrochlorothiazide (ZESTORETIC) 20-25 MG tablet TAKE TWO TABLETS BY MOUTH EVERY  tablet 1     metFORMIN (GLUCOPHAGE) 1000 MG tablet TAKE ONE TABLET BY MOUTH TWICE A DAY WITH MEALS 60 tablet 5     metFORMIN (GLUCOPHAGE) 500 MG tablet Take 2 tablets (1,000 mg) by mouth 2 times daily (with meals) 120 tablet 0     MULTIVITAMIN TABS   OR 1 tab daily  0     OMEGA-3 CAPS   OR 1200 mg.--1 tab bid  0     VITAMIN C 500 MG OR TABS 1 tab daily 60 0       Allergies   Allergen Reactions     No Known Drug Allergies         Social History     Tobacco Use     Smoking status: Former Smoker     Packs/day: 2.00     Years: 20.00     Pack years: 40.00     Types: Cigarettes     Quit date: 1999     Years since quittin.3     Smokeless tobacco: Former User   Substance Use Topics     Alcohol use: Yes     Alcohol/week: 0.0 standard drinks     Comment: very little     {FAMILY HISTORY (Optional):042447735}  History   Drug Use No            Objective   There were no vitals taken for this visit.  Physical Exam  {EXAM Preop Choices:583852}    Recent Labs   Lab Test 20  1010 19  0749    134   POTASSIUM  "3.7 3.9   CR 0.69 0.96   A1C 7.5* 9.8*        PRE-OP Diagnostics:  {LABS:850564}  {EK}    {Provider  Link to PREOP [facility] :060220}     Assessment & Plan   The proposed surgical procedure is considered {HIGH=major cardiovascular or procedures requiring prolonged anesthesia >4 hours or large fluid shifts;    INTERMEDIATE=abdominal, most orthopedic and intrathoracic surgery; LOW= endoscopy, cataract and breast surgery:696926} risk.    REVISED CARDIAC RISK INDEX  The patient has the following serious cardiovascular risks for perioperative complications:  {PREOP REVISED CARDIAC RISK INDEX (RCRI):968936::\"No serious cardiac risks = 0 points\"}    INTERPRETATION: {REVISED CARDIAC RISK INTERPRETATION:909909}    {Diag Picklist :328917}    The patient has the following additional risks and recommendations for perioperative complications:    {IMPORTANT - Conditions - complete carefully!!:919772}     MEDICATION INSTRUCTIONS:  {IMPORTANT - Medications:145985}    RECOMMENDATION:  {IMPORTANT - Approval:151709::\"APPROVAL GIVEN to proceed with proposed procedure, without further diagnostic evaluation.\"}    No follow-ups on file.    Signed Electronically by: Raymond Sierra DO    Copy of this evaluation report is provided to requesting physician.    [facility] Preop Guidelines    Revised Cardiac Risk Index  "

## 2020-10-09 NOTE — LETTER
October 19, 2020      Fredy Clark  [address] [facility] [address]        Dear ,    We are writing to inform you of your test results.    The urinary analysis demonstrates the presence of sugar.     You will be contacted with any outstanding results as they are available.   Feel free to contact me via the office or My Chart if you have any questions regarding the above.     Resulted Orders   *UA reflex to Microscopic and Culture ([facility]; [facility]; [facility]; [facility]; [facility]; [facility]; [place]; [place])   Result Value Ref [place]    Color Urine Yellow     Appearance Urine Clear     Glucose Urine 150 (A) NEG^Negative mg/dL    Bilirubin Urine Negative NEG^Negative    Ketones Urine Negative NEG^Negative mg/dL    Specific Gravity Urine 1.020 1.003 - 1.035    Blood Urine Negative NEG^Negative    pH Urine 6.0 5.0 - 7.0 pH    Protein Albumin Urine Negative NEG^Negative mg/dL    Urobilinogen mg/dL 2.0 0.0 - 2.0 mg/dL    Nitrite Urine Negative NEG^Negative    Leukocyte Esterase Urine Trace (A) NEG^Negative    Source Midstream Urine     RBC Urine 1 0 - 2 /HPF    WBC Urine 4 0 - 5 /HPF    Squamous Epithelial /HPF Urine <1 0 - 1 /HPF    Mucous Urine Present (A) NEG^Negative /LPF       If you have any questions or concerns, please call the clinic at the number listed above.       Sincerely,        Raymond Sierra, DO

## 2020-10-09 NOTE — PROGRESS NOTES
[address] [address]  [phone]  Dept: [phone]    PRE-OP EVALUATION:  Today's date: 10/9/2020    Fredy Clark (: 1960) presents for pre-operative evaluation assessment as requested by Dr. Luis.  He requires evaluation and anesthesia risk assessment prior to undergoing surgery/procedure for treatment of hip .    Proposed Surgery/ Procedure: right total hip  Date of Surgery/ Procedure: 10/20/20  Time of Surgery/ Procedure: 9am  Hospital/Surgical Facility:  OR  Surgery Fax Number: Note does not need to be faxed, will be available electronically in Epic.  Primary Physician: Raymond Sierra  Type of Anesthesia Anticipated: Spinal    Preoperative Questionnaire:   No - Have you ever had a heart attack or stroke?  Yes, Bypass - Have you ever had surgery on your heart or blood vessels, such as a stent, coronary (heart) bypass, or surgery on an artery in the head, neck, heart, or legs?  No - Do you have chest pain when you are physically active?  CHF - Do you have a history of heart failure?  No - Do you currently have a cold, bronchitis, or symptoms of other respiratory (head and chest) infections?  No - Do you have a cough, shortness of breath, or wheezing?  No - Do you or anyone in your family have a history of blood clots?  No - Do you or anyone in your family have a serious bleeding problem, such as long-lasting bleeding after surgeries or cuts?  No - Have you ever had anemia or been told to take iron pills?  No - Have you had any abnormal blood loss such as black, tarry or bloody stools, or abnormal vaginal bleeding?  No - Have you ever had a blood transfusion?  Yes - Are you willing to have a blood transfusion if it is medically needed before, during, or after your surgery?  No - Have you or anyone in your family ever had problems with anesthesia (sedation for surgery)?  No - Do you have sleep apnea, excessive snoring, or  daytime drowsiness?   Defibrillator - Do you have any artifical heart valves or other implanted medical devices, such as a pacemaker, defibrillator, or continuous glucose monitor?  Left hip - Do you have any artifical joints?  No - Are you allergic to latex?  No - Is there any chance that you may be pregnant?    Patient has a Health Care Directive or Living Will:  NO    HPI:     HPI related to upcoming procedure: The patient has a history of right hip pain.  He is anticipating total hip arthroplasty.  He does have a history of type 2 diabetes as well as left ventricular wall dysfunction.  He did have a preoperative Lexiscan in March 2018 in anticipation of this same surgery.  At that time he was found to have an ejection fraction of about 30%.  For that reason, conservative therapy was strongly suggested.  He was last seen by me in December of last year.  He has not been seen in the interim.  He does have a history of multivessel bypass, ischemic cardiomyopathy, and has a reported nonfunctioning ICD in place.      With respect to his hip, he presents about once a year requesting that it be replaced.  This time it would seem that he has been more compliant with his diabetic care and other health issues.    See problem list for active medical problems.  Problems all longstanding and stable, except as noted/documented.  See ROS for pertinent symptoms related to these conditions.      MEDICAL HISTORY:     Patient Active Problem List    Diagnosis Date Noted     Primary osteoarthritis of right hip 02/15/2019     Priority: Medium     Obesity (BMI 35.0-39.9) with comorbidity (H) 12/17/2018     Priority: Medium     Benign essential hypertension 07/18/2016     Priority: Medium     Status post total hip replacement, left 01/05/2016     Priority: Medium     Obesity, Class II, BMI 35-39.9, with comorbidity 10/26/2015     Priority: Medium     Type 2 diabetes mellitus without complication (H) 10/31/2010     Priority: Medium      Hyperlipidemia LDL goal <100 10/31/2010     Priority: Medium     Cardiovascular disease 03/29/2006     Priority: Medium     Problem list name updated by automated process. Provider to review        Past Medical History:   Diagnosis Date     Coronary atherosclerosis of unspecified type of vessel, native or graft     Coronary artery disease     Obesity, Class II, BMI 35-39.9, with comorbidity 10/26/2015     Obesity, unspecified      Other and unspecified hyperlipidemia      Type II or unspecified type diabetes mellitus without mention of complication, not stated as uncontrolled      Unspecified essential hypertension      Past Surgical History:   Procedure Laterality Date     ARTHROPLASTY HIP Left 1/5/2016    Procedure: ARTHROPLASTY HIP;  Surgeon: Dejon Luis DO;  Location: [facility] OR     C ANESTH,CARDIOVERTER/DEFIB  01/2001    Implant defibrillator, 06/2006 - pulse generator change serial # [id number]     C CABG, ARTERY-VEIN, FIVE       HC PPM GENERATOR REMOVAL  06/16/06    [facility]- removed Medtronic [id number] replaced with Medtronic Entrust#[id number]     Current Outpatient Medications   Medication Sig Dispense Refill     aspirin (ASA) 325 MG EC tablet Take 325 mg by mouth every 6 hours as needed for moderate pain       CAYENNE 500 MG OR CAPS 1 tab twice daily  0     lisinopril-hydrochlorothiazide (ZESTORETIC) 20-25 MG tablet TAKE TWO TABLETS BY MOUTH EVERY  tablet 1     metFORMIN (GLUCOPHAGE) 1000 MG tablet TAKE ONE TABLET BY MOUTH TWICE A DAY WITH MEALS 60 tablet 5     metFORMIN (GLUCOPHAGE) 500 MG tablet Take 2 tablets (1,000 mg) by mouth 2 times daily (with meals) 120 tablet 0     MULTIVITAMIN TABS   OR 1 tab daily  0     OMEGA-3 CAPS   OR 1200 mg.--1 tab bid  0     VITAMIN C 500 MG OR TABS 1 tab daily 60 0     OTC products: None, except as noted above    Allergies   Allergen Reactions     No Known Drug Allergies       Latex Allergy: NO    Social History     Tobacco Use     Smoking  status: Former Smoker     Packs/day: 2.00     Years: 20.00     Pack years: 40.00     Types: Cigarettes     Quit date: 1999     Years since quittin.3     Smokeless tobacco: Former User   Substance Use Topics     Alcohol use: Yes     Alcohol/week: 0.0 standard drinks     Comment: very little     History   Drug Use No       REVIEW OF SYSTEMS:   CONSTITUTIONAL: NEGATIVE for fever, chills, change in weight  INTEGUMENTARY/SKIN: NEGATIVE for worrisome rashes, moles or lesions  EYES: NEGATIVE for vision changes or irritation  ENT/MOUTH: NEGATIVE for ear, mouth and throat problems  RESP: NEGATIVE for significant cough or SOB  CV: NEGATIVE for chest pain, palpitations or peripheral edema  GI: NEGATIVE for nausea, abdominal pain, heartburn, or change in bowel habits  : NEGATIVE for frequency, dysuria, or hematuria  MUSCULOSKELETAL: NEGATIVE for significant arthralgias or myalgia  NEURO: NEGATIVE for weakness, dizziness or paresthesias  ENDOCRINE: NEGATIVE for temperature intolerance, skin/hair changes  HEME: NEGATIVE for bleeding problems  PSYCHIATRIC: NEGATIVE for changes in mood or affect    EXAM:   There were no vitals taken for this visit.    GENERAL APPEARANCE: healthy, alert and no distress     EYES: EOMI,  PERRL     HENT: ear canals and TM's normal and nose and mouth without ulcers or lesions     NECK: no adenopathy, no asymmetry, masses, or scars and thyroid normal to palpation     RESP: lungs clear to auscultation - no rales, rhonchi or wheezes     CV: regular rates and rhythm, normal S1 S2, no S3 or S4 and no murmur, click or rub     ABDOMEN:  soft, nontender, no HSM or masses and bowel sounds normal     MS: Same his knees and hips is noted.  No acute inflammation or overlying infection is noted.     SKIN: no suspicious lesions or rashes     NEURO: Normal strength and tone, sensory exam grossly normal, mentation intact and speech normal     PSYCH: mentation appears normal. and affect normal/bright      LYMPHATICS: No cervical adenopathy    DIAGNOSTICS:   Given his history of multiple risk factors including diabetic, cardiac, and noncompliance, we will set up a Lexiscan for evaluation of inducible ischemia.    Recent Labs   Lab Test 08/03/20  1010 12/23/19  0749 03/14/18  1434 03/14/18  1434 01/27/16  1103 01/27/16  1103   HGB  --   --   --  15.5  --  12.9*   PLT  --   --   --  196  --  387    134   < > 134   < >  --    POTASSIUM 3.7 3.9   < > 3.7   < >  --    CR 0.69 0.96   < > 0.76   < >  --    A1C 7.5* 9.8*   < >  --    < >  --     < > = values in this interval not displayed.        IMPRESSION:   Reason for surgery/procedure: Persistent pain in the hip associated with degenerative joint disease.  Diagnosis/reason for consult: Perioperative risk assessment in a 60-year-old male with:  1. Preop general physical exam    2. Primary osteoarthritis of right hip    3. Type 2 diabetes mellitus without complication, without long-term current use of insulin (H)  - *UA reflex to Microscopic and Culture ([place]; [facility]; [facility]; [facility]; [facility]; [facility]; [place]; [place])    4. History of ventricular tachycardia    5. Ischemic cardiomyopathy    6. Status post aorto-coronary artery bypass graft    7. Hyperlipidemia LDL goal <100    8. Obesity (BMI 35.0-39.9) with comorbidity (H)    9. Personal history of noncompliance with medical treatment, presenting hazards to health    The proposed surgical procedure is considered INTERMEDIATE risk.    REVISED CARDIAC RISK INDEX  The patient has the following serious cardiovascular risks for perioperative complications such as (MI, PE, VFib and 3  AV Block):  Coronary Artery Disease (MI, positive stress test, angina, Qs on EKG)  Congestive Heart Failure (pulmonary edema, PND, s3 lilian, CXR with pulmonary congestion, basilar rales)  INTERPRETATION: 3 risks: Class IV (high risk - >11% complication rate)    The patient has the following  additional risks for perioperative complications:  No identified additional risks      ICD-10-CM    1. Preop general physical exam  Z01.818        RECOMMENDATIONS:     --Consult hospital rounder / IM to assist post-op medical management    --Patient is to take all scheduled medications on the day of surgery EXCEPT for modifications listed below.    ACE Inhibitor or Angiotensin Receptor Blocker (ARB) Use  Ace inhibitor or Angiotensin Receptor Blocker (ARB) and should HOLD this medication for the 24 hours prior to surgery.    At this time, pending the results of the Lexiscan, I believe the patient is at an unnecessarily high risk for complication from an elective surgery.        Signed Electronically by: Raymond Sierra DO    Copy of this evaluation report is provided to requesting physician.    [facility] Preop Guidelines    Revised Cardiac Risk Index

## 2020-10-09 NOTE — PATIENT INSTRUCTIONS

## 2020-10-15 ENCOUNTER — HOSPITAL ENCOUNTER (OUTPATIENT)
Dept: NUCLEAR MEDICINE | Facility: CLINIC | Age: 60
Setting detail: NUCLEAR MEDICINE
End: 2020-10-15
Attending: INTERNAL MEDICINE
Payer: COMMERCIAL

## 2020-10-15 ENCOUNTER — HOSPITAL ENCOUNTER (OUTPATIENT)
Dept: CARDIOLOGY | Facility: CLINIC | Age: 60
Setting detail: NUCLEAR MEDICINE
End: 2020-10-15
Attending: INTERNAL MEDICINE
Payer: COMMERCIAL

## 2020-10-15 PROCEDURE — 93018 CV STRESS TEST I&R ONLY: CPT | Performed by: INTERNAL MEDICINE

## 2020-10-15 PROCEDURE — 78452 HT MUSCLE IMAGE SPECT MULT: CPT

## 2020-10-15 PROCEDURE — A9502 TC99M TETROFOSMIN: HCPCS | Performed by: INTERNAL MEDICINE

## 2020-10-15 PROCEDURE — 78452 HT MUSCLE IMAGE SPECT MULT: CPT | Mod: 26 | Performed by: INTERNAL MEDICINE

## 2020-10-15 PROCEDURE — 250N000011 HC RX IP 250 OP 636: Performed by: INTERNAL MEDICINE

## 2020-10-15 PROCEDURE — 343N000001 HC RX 343: Performed by: INTERNAL MEDICINE

## 2020-10-15 PROCEDURE — 93017 CV STRESS TEST TRACING ONLY: CPT | Performed by: INTERNAL MEDICINE

## 2020-10-15 PROCEDURE — 93016 CV STRESS TEST SUPVJ ONLY: CPT | Performed by: INTERNAL MEDICINE

## 2020-10-15 RX ORDER — REGADENOSON 0.08 MG/ML
0.4 INJECTION, SOLUTION INTRAVENOUS ONCE
Status: COMPLETED | OUTPATIENT
Start: 2020-10-15 | End: 2020-10-15

## 2020-10-15 RX ADMIN — REGADENOSON 0.4 MG: 0.08 INJECTION, SOLUTION INTRAVENOUS at 14:25

## 2020-10-15 RX ADMIN — TETROFOSMIN 37.1 MCI.: 1.38 INJECTION, POWDER, LYOPHILIZED, FOR SOLUTION INTRAVENOUS at 14:30

## 2020-10-15 RX ADMIN — TETROFOSMIN 11.9 MCI.: 1.38 INJECTION, POWDER, LYOPHILIZED, FOR SOLUTION INTRAVENOUS at 13:00

## 2020-10-15 NOTE — H&P
Fredy Clark is an 60 year old male.    Past Medical History:   Diagnosis Date    Coronary atherosclerosis of unspecified type of vessel, native or graft     Coronary artery disease    Obesity, Class II, BMI 35-39.9, with comorbidity 10/26/2015    Obesity, unspecified     Other and unspecified hyperlipidemia     Type II or unspecified type diabetes mellitus without mention of complication, not stated as uncontrolled     Unspecified essential hypertension        Allergies:   Allergies   Allergen Reactions    No Known Drug Allergies        Active Problems:    * No active hospital problems. *    There were no vitals taken for this visit.    Review of Systems    Physical Exam    Assessment    Plan:      Alexandra Barnard RN  10/15/2020

## 2020-10-16 ENCOUNTER — OFFICE VISIT (OUTPATIENT)
Dept: ORTHOPEDICS | Facility: CLINIC | Age: 60
End: 2020-10-16
Payer: COMMERCIAL

## 2020-10-16 ENCOUNTER — TELEPHONE (OUTPATIENT)
Dept: FAMILY MEDICINE | Facility: OTHER | Age: 60
End: 2020-10-16

## 2020-10-16 VITALS
HEIGHT: 71 IN | SYSTOLIC BLOOD PRESSURE: 148 MMHG | WEIGHT: 268 LBS | BODY MASS INDEX: 37.52 KG/M2 | DIASTOLIC BLOOD PRESSURE: 83 MMHG

## 2020-10-16 DIAGNOSIS — Z11.59 ENCOUNTER FOR SCREENING FOR OTHER VIRAL DISEASES: ICD-10-CM

## 2020-10-16 DIAGNOSIS — Z01.818 PREOPERATIVE EXAMINATION: ICD-10-CM

## 2020-10-16 DIAGNOSIS — M16.11 PRIMARY OSTEOARTHRITIS OF RIGHT HIP: Primary | ICD-10-CM

## 2020-10-16 DIAGNOSIS — R94.39 ABNORMAL STRESS TEST: Primary | ICD-10-CM

## 2020-10-16 LAB
CV STRESS MAX HR HE: 117
ERYTHROCYTE [DISTWIDTH] IN BLOOD BY AUTOMATED COUNT: 11.6 % (ref 10–15)
HCT VFR BLD AUTO: 42.7 % (ref 40–53)
HGB BLD-MCNC: 15.3 G/DL (ref 13.3–17.7)
MCH RBC QN AUTO: 32.3 PG (ref 26.5–33)
MCHC RBC AUTO-ENTMCNC: 35.8 G/DL (ref 31.5–36.5)
MCV RBC AUTO: 90 FL (ref 78–100)
NUC STRESS EJECTION FRACTION: 31 %
PLATELET # BLD AUTO: 235 10E9/L (ref 150–450)
RATE PRESSURE PRODUCT: NORMAL
RATED PERCEIVED EXERTION: NORMAL
RBC # BLD AUTO: 4.73 10E12/L (ref 4.4–5.9)
STRESS ECHO BASELINE DIASTOLIC HE: 93
STRESS ECHO BASELINE HR: 77
STRESS ECHO BASELINE SYSTOLIC BP: 170
STRESS ECHO CALCULATED PERCENT HR: 74 %
STRESS ECHO LAST STRESS DIASTOLIC BP: 84
STRESS ECHO LAST STRESS SYSTOLIC BP: 148
STRESS ECHO TARGET HR: 159
WBC # BLD AUTO: 6.5 10E9/L (ref 4–11)

## 2020-10-16 PROCEDURE — U0003 INFECTIOUS AGENT DETECTION BY NUCLEIC ACID (DNA OR RNA); SEVERE ACUTE RESPIRATORY SYNDROME CORONAVIRUS 2 (SARS-COV-2) (CORONAVIRUS DISEASE [COVID-19]), AMPLIFIED PROBE TECHNIQUE, MAKING USE OF HIGH THROUGHPUT TECHNOLOGIES AS DESCRIBED BY CMS-2020-01-R: HCPCS | Performed by: ORTHOPAEDIC SURGERY

## 2020-10-16 PROCEDURE — 85027 COMPLETE CBC AUTOMATED: CPT | Performed by: ORTHOPAEDIC SURGERY

## 2020-10-16 PROCEDURE — 36415 COLL VENOUS BLD VENIPUNCTURE: CPT | Performed by: ORTHOPAEDIC SURGERY

## 2020-10-16 PROCEDURE — 99207 PR PREOP VISIT IN GLOBAL PKG: CPT | Performed by: PHYSICIAN ASSISTANT

## 2020-10-16 ASSESSMENT — PAIN SCALES - GENERAL: PAINLEVEL: MODERATE PAIN (5)

## 2020-10-16 ASSESSMENT — MIFFLIN-ST. JEOR: SCORE: 2034.83

## 2020-10-16 NOTE — TELEPHONE ENCOUNTER
Patient had an abnormal stress test and now needs to see cardiology before he can get his surgery done.  The surgery has been cancelled for now. I did call Fredy to let him know and he was upset that the surgery is canceled.  Ortho would like you to call Fredy Monday morning to discuss his results again.  I get in at 8:15 and will talk to cardio to see if we can work him in asap.  Please send this back to me when you are done talking to the patient Dr. Sierra.   Thank you!!

## 2020-10-16 NOTE — LETTER
10/16/2020         RE: Fredy Clark  [address] [facility] [address]        Dear Colleague,    Thank you for referring your patient, Fredy Clark, to the [facility]. Please see a copy of my visit note below.    One Week Prior to Total Joint    1. Surgery: Right total hip arthroplasty, 10/20/2020  2. Labs: Within normal limits  3. H&P: Seen by Dr. Sierra 10/9/2020.  Currently not cleared secondary to Lexiscan/stress test.  Will contact Dr. Sierra today to try and find out if he is cleared.  Stress test was yesterday.  Dr. Sierra would like a hospital rounder.  Patient is diabetic type II with a hemoglobin A1c of 7.5, obesity, hyperlipidemia, cardiovascular disease, hypertension.  4. Covid test: We organized to get this done today.  It is pending.  5. DVT prophylaxis: The type of anticoagulation was not dictated in the previous visit but I would anticipate aspirin x6 weeks.  6. Dispo: Next day discharged to home.  Patient plans to discharge to his mother's home where he has sister is with his mom full-time and there are no steps.    ADDENDUM: After a lot of calls and working we found that the stress test information had not gone to the proper place.  This afternoon it was read and since Dr. Sierra is out Dr. Sherman read the stress test and canceled surgery.  We made all the appropriate calls and let the operating room know.  Unfortunately the patient's case will be canceled until he sees cardiology for further work-up.  I also called the patient to let them know.      This note was dictated with [employer].    Ruy Mane PA-C          Again, thank you for allowing me to participate in the care of your patient.        Sincerely,        Ruy Mane PA-C

## 2020-10-16 NOTE — PROGRESS NOTES
One Week Prior to Total Joint    1. Surgery: Right total hip arthroplasty, 10/20/2020  2. Labs: Within normal limits  3. H&P: Seen by Dr. Sierra 10/9/2020.  Currently not cleared secondary to Lexiscan/stress test.  Will contact Dr. Sierra today to try and find out if he is cleared.  Stress test was yesterday.  Dr. Sierra would like a hospital rounder.  Patient is diabetic type II with a hemoglobin A1c of 7.5, obesity, hyperlipidemia, cardiovascular disease, hypertension.  4. Covid test: We organized to get this done today.  It is pending.  5. DVT prophylaxis: The type of anticoagulation was not dictated in the previous visit but I would anticipate aspirin x6 weeks.  6. Dispo: Next day discharged to home.  Patient plans to discharge to his mother's home where he has sister is with his mom full-time and there are no steps.    ADDENDUM: After a lot of calls and working we found that the stress test information had not gone to the proper place.  This afternoon it was read and since Dr. Sierra is out Dr. Sherman read the stress test and canceled surgery.  We made all the appropriate calls and let the operating room know.  Unfortunately the patient's case will be canceled until he sees cardiology for further work-up.  I also called the patient to let them know.      This note was dictated with [id number].    Ruy Mane PA-C

## 2020-10-18 LAB
SARS-COV-2 RNA SPEC QL NAA+PROBE: NOT DETECTED
SPECIMEN SOURCE: NORMAL

## 2020-10-19 NOTE — RESULT ENCOUNTER NOTE
Dear Fredy, your recent test results are attached.    The urinary analysis demonstrates the presence of sugar.    You will be contacted with any outstanding results as they are available.  Feel free to contact me via the office or My Chart if you have any questions regarding the above.

## 2020-10-19 NOTE — TELEPHONE ENCOUNTER
Spoke to patient regarding the significance of his stress test.  Reiterated the significance of his stress test.  Discussed that the pain from his hip is not causing the abnormal stress test and that fixing the hip is not the appropriate treatment for a failed stress test.  Discussed potential cardiac work-up.  We will schedule with cardiology for evaluation of coronary artery disease and his decreased ejection fraction.    Patient not happy but seems to understand.    Mariela

## 2020-11-16 ENCOUNTER — TELEPHONE (OUTPATIENT)
Dept: ORTHOPEDICS | Facility: OTHER | Age: 60
End: 2020-11-16

## 2020-11-16 ENCOUNTER — OFFICE VISIT (OUTPATIENT)
Dept: CARDIOLOGY | Facility: CLINIC | Age: 60
End: 2020-11-16
Payer: COMMERCIAL

## 2020-11-16 ENCOUNTER — HOSPITAL ENCOUNTER (OUTPATIENT)
Dept: CARDIOLOGY | Facility: CLINIC | Age: 60
Discharge: HOME | End: 2020-11-16
Attending: INTERNAL MEDICINE | Admitting: INTERNAL MEDICINE
Payer: COMMERCIAL

## 2020-11-16 VITALS
DIASTOLIC BLOOD PRESSURE: 82 MMHG | BODY MASS INDEX: 38.33 KG/M2 | HEIGHT: 71 IN | SYSTOLIC BLOOD PRESSURE: 144 MMHG | HEART RATE: 90 BPM | OXYGEN SATURATION: 97 % | WEIGHT: 273.8 LBS

## 2020-11-16 DIAGNOSIS — M16.11 PRIMARY OSTEOARTHRITIS OF RIGHT HIP: Primary | ICD-10-CM

## 2020-11-16 DIAGNOSIS — Z95.1 HISTORY OF CORONARY ARTERY BYPASS SURGERY: Primary | ICD-10-CM

## 2020-11-16 DIAGNOSIS — R94.39 ABNORMAL STRESS TEST: ICD-10-CM

## 2020-11-16 DIAGNOSIS — Z95.1 HISTORY OF CORONARY ARTERY BYPASS SURGERY: ICD-10-CM

## 2020-11-16 PROCEDURE — 93306 TTE W/DOPPLER COMPLETE: CPT | Mod: 26 | Performed by: INTERNAL MEDICINE

## 2020-11-16 PROCEDURE — 99214 OFFICE O/P EST MOD 30 MIN: CPT | Mod: GV | Performed by: INTERNAL MEDICINE

## 2020-11-16 PROCEDURE — 255N000002 HC RX 255 OP 636: Performed by: INTERNAL MEDICINE

## 2020-11-16 PROCEDURE — 93306 TTE W/DOPPLER COMPLETE: CPT

## 2020-11-16 RX ADMIN — HUMAN ALBUMIN MICROSPHERES AND PERFLUTREN 5 ML: 10; .22 INJECTION, SOLUTION INTRAVENOUS at 10:42

## 2020-11-16 ASSESSMENT — MIFFLIN-ST. JEOR: SCORE: 2061.14

## 2020-11-16 NOTE — LETTER
11/16/2020    Raymondrocio Nevillemarcos, DO  [address] [facility] [address]    RE: Fredy Clark       Dear Colleague,    I had the pleasure of seeing Fredy Clark in the [facility].      HPI:     This is a 60 year old with PMH Ischemic cardiomyopathy, LVEF 30%, 5V CABG for CAD in 2001, ICD no longer functioning, Atrial flutter not on anticoagulation, HLD, HTN here for preoperative clearance prior to hip surgery.     Does not follow with cardiology regularly. Last seen 1/7/2019 by Dr. Molina during which ICD replacement was discussed but patient declined. Also declined blood thinner therapy for aflutter. Patient preferred a simplified medication regimen so he agreed to aspirin 325 mg daily as well as hydrochlorothiazide-lisinopril. His blood pressure he reports has been well controlled usually SBP in the 120s. Patient no longer on statin due to feared side effects.    From a heart failure standpoint he is not with any symptoms. Denies SOB, le edema, orthopnea, PND. From his atrial flutter he is also without any dizziness, syncope, palpitations. Reports no TIA or stroke like symptoms. And with his CAD history, recent stress test demonstrated small area of apical anterior ischemia and old infarct otherwise it was unremarkable. Patient can perform > 4 METS (climbs stairs) without chest pressure or pain.     ASSESSMENT/PLAN:    1. Aflutter: asymptomatic. EKG recently with normal sinus rhythm.   -discussed again anticoagulation and patient declined  -will continue aspirin despite elevated CHADSVASC   -patient declined beta blocker in the past     2. CAD s/p 5V CABG: asymptomatic, no chest pain  -nuclear stress test with only small amount of distal apical ischemia. Prefer medical management.  -Continue aspirin, statin   -perioperative IV beta blocker if needed    3. Ischemic cardiomyopathy: well compensated, no heart failure symptoms today   -due to repeat echocardiogram to see if  improved LV function  -s/p ICD for which battery has run out. Patient declines replacement after discussing data suggesting improved survival in low LVEF. Do not advise removal as this is a high risk procedure.     4. Pre-operative clearance: patient can perform 4 METS without anginal symptoms, no valvular disease or significant decompensated heart failure or uncontrolled arrhythmias. Recommend proceeding with surgery with no further cardiac work up needed. Would continue aspirin perioperatively.    5. HTN: continue lisinopril and hydrochlorothiazide    6. HLD: not on statin. Recommended  Repeat lipid check.     Savannah Garcia MD MSc  [facility]     PAST MEDICAL HISTORY  Past Medical History:   Diagnosis Date     Coronary atherosclerosis of unspecified type of vessel, native or graft     Coronary artery disease     Obesity, Class II, BMI 35-39.9, with comorbidity 10/26/2015     Obesity, unspecified      Other and unspecified hyperlipidemia      Type II or unspecified type diabetes mellitus without mention of complication, not stated as uncontrolled      Unspecified essential hypertension        CURRENT MEDICATIONS  Current Outpatient Medications   Medication Sig Dispense Refill     aspirin (ASA) 325 MG EC tablet Take 325 mg by mouth every 6 hours as needed for moderate pain       CAYENNE 500 MG OR CAPS 1 tab twice daily  0     lisinopril-hydrochlorothiazide (ZESTORETIC) 20-25 MG tablet TAKE TWO TABLETS BY MOUTH EVERY  tablet 1     metFORMIN (GLUCOPHAGE) 1000 MG tablet TAKE ONE TABLET BY MOUTH TWICE A DAY WITH MEALS 60 tablet 5     MULTIVITAMIN TABS   OR 1 tab daily  0     OMEGA-3 CAPS   OR 1200 mg.--1 tab bid  0     VITAMIN C 500 MG OR TABS 1 tab daily 60 0       PAST SURGICAL HISTORY:  Past Surgical History:   Procedure Laterality Date     ARTHROPLASTY HIP Left 1/5/2016    Procedure: ARTHROPLASTY HIP;  Surgeon: Dejon Luis DO;  Location: [facility] OR     C ANESTH,CARDIOVERTER/DEFIB  01/2001     Implant defibrillator, 2006 - pulse generator change serial # [id number]     C CABG, ARTERY-VEIN, FIVE       HC PPM GENERATOR REMOVAL  06    [facility]- removed Medtronic [id number] replaced with Medtronic Entrust#[id number]       ALLERGIES     Allergies   Allergen Reactions     No Known Drug Allergies        FAMILY HISTORY  No family history on file.      SOCIAL HISTORY  Social History     Socioeconomic History     Marital status: Single     Spouse name: Not on file     Number of children: Not on file     Years of education: Not on file     Highest education level: Not on file   Occupational History     Not on file   Social Needs     Financial resource strain: Not on file     Food insecurity     Worry: Not on file     Inability: Not on file     Transportation needs     Medical: Not on file     Non-medical: Not on file   Tobacco Use     Smoking status: Former Smoker     Packs/day: 2.00     Years: 20.00     Pack years: 40.00     Types: Cigarettes     Quit date: 1999     Years since quittin.4     Smokeless tobacco: Former User   Substance and Sexual Activity     Alcohol use: Yes     Alcohol/week: 0.0 standard drinks     Comment: very little     Drug use: No     Sexual activity: Not Currently   Lifestyle     Physical activity     Days per week: Not on file     Minutes per session: Not on file     Stress: Not on file   Relationships     Social connections     Talks on phone: Not on file     Gets together: Not on file     Attends Synagogue service: Not on file     Active member of club or organization: Not on file     Attends meetings of clubs or organizations: Not on file     Relationship status: Not on file     Intimate partner violence     Fear of current or ex partner: Not on file     Emotionally abused: Not on file     Physically abused: Not on file     Forced sexual activity: Not on file   Other Topics Concern     Parent/sibling w/ CABG, MI or angioplasty before 65F 55M? Not Asked   Social  "History Narrative     Not on file       ROS:   Constitutional: No fever, chills, or sweats. No weight gain/loss   ENT: No visual disturbance, ear ache, epistaxis, sore throat  Allergies/Immunologic: Negative  Respiratory: No cough, hemoptysia  Cardiovascular: As per HPI  GI: No nausea, vomiting, hematemesis, melena, or hematochezia  : No urinary frequency, dysuria, or hematuria  Integument: Negative  Psychiatric: Negative  Neuro: Negative  Endocrinology: Negative   Musculoskeletal: Negative  Vascular: No walking impairment, claudication, ischemic rest pain or nonhealing wounds    EXAM:  BP (!) 144/82 (BP Location: Right arm, Patient Position: Fowlers, Cuff Size: Adult Large)   Pulse 90   Ht 1.791 m (5' 10.5\")   Wt 124.2 kg (273 lb 12.8 oz)   SpO2 97%   BMI 38.73 kg/m    In general, the patient is a pleasant male in no apparent distress.    HEENT: NC/AT.  PERRLA.  EOMI.  Sclerae white, not injected.  Nares clear.  Pharynx without erythema or exudate.  Dentition intact.    Neck: No adenopathy.  No thyromegaly. Carotids +2/2 bilaterally without bruits.  No jugular venous distension.   Heart: RRR. Normal S1, S2 splits physiologically. No murmur, rub, click, or gallop. The PMI is in the 5th ICS in the midclavicular line. There is no heave.    Lungs: CTA.  No ronchi, wheezes, rales.  No dullness to percussion.   Abdomen: Soft, nontender, nondistended. No organomegaly. No AAA.  No bruits.   Extremities: No clubbing, cyanosis, or edema.  No wounds. No varicose veins signs of chronic venous insufficiency.   Vascular: No bruits are noted.    Labs:  LIPID RESULTS:  Lab Results   Component Value Date    CHOL 193 08/03/2020    HDL 35 (L) 08/03/2020     (H) 08/03/2020    TRIG 165 (H) 08/03/2020    CHOLHDLRATIO 6.4 (H) 09/09/2015    NHDL 158 (H) 08/03/2020       LIVER ENZYME RESULTS:  Lab Results   Component Value Date    AST 10 08/03/2020    ALT 18 08/03/2020       CBC RESULTS:  Lab Results   Component Value Date    " WBC 6.5 10/16/2020    RBC 4.73 10/16/2020    HGB 15.3 10/16/2020    HCT 42.7 10/16/2020    MCV 90 10/16/2020    MCH 32.3 10/16/2020    MCHC 35.8 10/16/2020    RDW 11.6 10/16/2020     10/16/2020       BMP RESULTS:  Lab Results   Component Value Date     08/03/2020    POTASSIUM 3.7 08/03/2020    CHLORIDE 105 08/03/2020    CO2 29 08/03/2020    ANIONGAP 4 08/03/2020     (H) 08/03/2020    BUN 18 08/03/2020    CR 0.69 08/03/2020    GFRESTIMATED >90 08/03/2020    GFRESTBLACK >90 08/03/2020    STEPHANIE 9.2 08/03/2020        A1C RESULTS:  Lab Results   Component Value Date    A1C 7.5 (H) 08/03/2020       Thank you for allowing me to participate in the care of your patient.    Sincerely,     Savannah Garcia MD     [facility]

## 2020-11-16 NOTE — TELEPHONE ENCOUNTER
Please place case request.    Message left for return call.  Unable to schedule total replacement right now due to covid restrictions. We will call patient to schedule  when given the OK.

## 2020-11-16 NOTE — TELEPHONE ENCOUNTER
Reason for Call:  Other call back    Detailed comments: pt stated he would like a call to set up hip surgery     Phone Number Patient can be reached at: Home number on file [phone] (home)    Best Time: NA     Can we leave a detailed message on this number? YES    Call taken on 11/16/2020 at 11:11 AM by Jackelin Pugh

## 2020-11-16 NOTE — PATIENT INSTRUCTIONS
1. Echocardiogram today  2. No further cardiac workup thereafter prior to orthopedic surgery with Dr. Luis   3. Continue aspirin 325 mg daily   4. One year follow up recommended

## 2020-11-18 ENCOUNTER — CARE COORDINATION (OUTPATIENT)
Dept: CARDIOLOGY | Facility: CLINIC | Age: 60
End: 2020-11-18

## 2020-11-18 NOTE — PROGRESS NOTES
Complete Echocardiogram Interpretation Summary:  Severe hypokinesis of the inferior, inferoseptal and inferolateral walls.  Left ventricular systolic function is moderately reduced.  The visual ejection fraction is estimated at 40%.  The left atrium is mildly dilated.  No hemodynamically significant valvular abnormalities on 2D or color flow  imaging. The study was technically difficult. There is no comparison study  Available.      Last OV 11/16/20 w/ Dr. Garcia:   ASSESSMENT/PLAN:     1. Aflutter: asymptomatic. EKG recently with normal sinus rhythm.   -discussed again anticoagulation and patient declined  -will continue aspirin despite elevated CHADSVASC   -patient declined beta blocker in the past   2. CAD s/p 5V CABG: asymptomatic, no chest pain  -nuclear stress test with only small amount of distal apical ischemia. Prefer medical management.  -Continue aspirin, statin   -perioperative IV beta blocker if needed  3. Ischemic cardiomyopathy: well compensated, no heart failure symptoms today   -due to repeat echocardiogram to see if improved LV function  -s/p ICD for which battery has run out. Patient declines replacement after discussing data suggesting improved survival in low LVEF. Do not advise removal as this is a high risk procedure.   4. Pre-operative clearance: patient can perform 4 METS without anginal symptoms, no valvular disease or significant decompensated heart failure or uncontrolled arrhythmias. Recommend proceeding with surgery with no further cardiac work up needed. Would continue aspirin perioperatively.  5. HTN: continue lisinopril and hydrochlorothiazide  6. HLD: not on statin. Recommended  Repeat lipid check.       Will route to Dr. Garcia for further recommendations.     MERCEDES Walker November 18, 2020 11:54 AM

## 2020-11-18 NOTE — LETTER
January 22, 2021       TO: Fredy Clark   [address]  [place] [address]       Dear Mr. Clark,    The results of your recent echocardiogram are below. We have been unable to reach you via phone to discuss these:    Procedure  Complete Echo Adult. Optison (NDC #[id number]) given intravenously.  _____________________________________________________________________________  __        Interpretation Summary     Severe hypokinesis of the inferior, inferoseptal and inferolateral walls.  Left ventricular systolic function is moderately reduced.  The visual ejection fraction is estimated at 40%.  The left atrium is mildly dilated.  No hemodynamically significant valvular abnormalities on 2D or color flow  imaging. The study was technically difficult. There is no comparison study  available.  _____________________________________________________________________________  __        Left Ventricle  The left ventricle is normal in size. There is normal left ventricular wall  thickness. Grade I or early diastolic dysfunction. Diastolic Doppler findings  (E/E' ratio and/or other parameters) suggest left ventricular filling  pressures are indeterminate. Left ventricular systolic function is moderately  reduced. The visual ejection fraction is estimated at 40%. Severe hypokinesis  of the inferior, inferoseptal and inferolateral walls. Septal motion is  consistent with conduction abnormality.     Right Ventricle  The right ventricle is normal in structure, function and size. There is a  pacemaker lead in the right ventricle.     Atria  The left atrium is mildly dilated. Right atrial size is normal. There is no  atrial shunt seen.     Mitral Valve  The mitral valve is normal in structure and function. There is trace mitral  regurgitation.        Tricuspid Valve  The tricuspid valve is normal in structure and function. There is trace  tricuspid regurgitation. IVC diameter <2.1 cm collapsing >50% with sniff  suggests a normal RA  pressure of 3 mmHg. Right ventricular systolic pressure  could not be approximated due to inadequate tricuspid regurgitation.     Aortic Valve  The aortic valve is normal in structure and function. No aortic regurgitation  is present. No aortic stenosis is present.     Pulmonic Valve  The pulmonic valve is not well seen, but is grossly normal. There is trace  pulmonic valvular regurgitation.     Vessels  Normal size aorta.     Pericardium  There is no pericardial effusion.        Rhythm  Sinus rhythm was noted.  _____________________________________________________________________________  __  MMode/2D Measurements & Calculations  IVSd: 1.1 cm     LVIDd: 5.4 cm  LVIDs: 5.0 cm  LVPWd: 0.99 cm  FS: 6.5 %  LV mass(C)d: 219.0 grams  LV mass(C)dI: 92.3 grams/m2  Ao root diam: 3.1 cm  LA dimension: 4.7 cm  asc Aorta Diam: 3.2 cm  LA/Ao: 1.5  LA Volume (BP): 101.0 ml  LA Volume Index (BP): 42.6 ml/m2  RWT: 0.37           Doppler Measurements & Calculations  MV E max bryce: 75.7 cm/sec  MV A max bryce: 108.7 cm/sec  MV E/A: 0.70  MV dec slope: 446.5 cm/sec2  MV dec time: 0.17 sec  PA acc time: 0.10 sec  E/E' av.0  Lateral E/e': 12.0  Medial E/e': 13.9      Dr. Garcia reviewed your echo and would like to continue medical management. Your EF is above 35%, meaning there is no indication for an ICD at this point. Please let us know if you have any further questions.     Sincerely,    Dr. Garcia's Nurse Team  [phone]  [facility] [place]

## 2020-11-23 NOTE — PROGRESS NOTES
Ok thanks. Continued medical management. Echocardiogram reveals adequate function above 35% meaning no ICD needed. -Dr. Garcia      Called patient to go over results. Left  for call back.       MERCEDES Walker November 23, 2020 2:13 PM

## 2020-12-16 NOTE — PROGRESS NOTES
Tried to call patient to review echo results below. No answer. Left  to call team 4 back with direct number

## 2020-12-20 DIAGNOSIS — I10 BENIGN ESSENTIAL HYPERTENSION: ICD-10-CM

## 2020-12-22 NOTE — TELEPHONE ENCOUNTER
Routing refill request to provider for review/approval because:  Labs out of range:  BP    JESIKA AvelarN, RN  [facility]

## 2020-12-28 RX ORDER — LISINOPRIL AND HYDROCHLOROTHIAZIDE 20; 25 MG/1; MG/1
TABLET ORAL
Qty: 180 TABLET | Refills: 1 | Status: ON HOLD | OUTPATIENT
Start: 2020-12-28 | End: 2021-02-02

## 2020-12-30 DIAGNOSIS — E11.9 TYPE 2 DIABETES MELLITUS WITHOUT COMPLICATION, WITHOUT LONG-TERM CURRENT USE OF INSULIN (H): ICD-10-CM

## 2020-12-30 DIAGNOSIS — I10 BENIGN ESSENTIAL HYPERTENSION: ICD-10-CM

## 2021-01-04 PROBLEM — M16.11 PRIMARY OSTEOARTHRITIS OF RIGHT HIP: Status: ACTIVE | Noted: 2019-02-15

## 2021-01-04 RX ORDER — LISINOPRIL AND HYDROCHLOROTHIAZIDE 20; 25 MG/1; MG/1
TABLET ORAL
Qty: 180 TABLET | Refills: 1 | OUTPATIENT
Start: 2021-01-04

## 2021-01-04 NOTE — TELEPHONE ENCOUNTER
Prescription approved per [facility] Refill Protocol (Metformin).    Serenity Douglas, BSN, RN  [facility]

## 2021-01-04 NOTE — TELEPHONE ENCOUNTER
Type of surgery: ARTHROPLASTY, HIP, TOTAL  Location of surgery: [facility]  Date and time of surgery: 2/2  Surgeon: Toby  Pre-Op Appt Date: 1/22  Post-Op Appt Date: 2/17   Packet sent out: pt still has packet denied new one  Pre-cert/Authorization completed:  Not Applicable  Date: na

## 2021-01-13 DIAGNOSIS — Z11.59 ENCOUNTER FOR SCREENING FOR OTHER VIRAL DISEASES: Primary | ICD-10-CM

## 2021-01-20 NOTE — PROGRESS NOTES
Attempted to contact patient 2nd time to review Dr. Garcia's recommendations and results. Patient did not answer. Left message for patient to call back.

## 2021-01-20 NOTE — PROGRESS NOTES
One Week Prior to Total Joint    1. Surgery: 2/2/2021 right total hip arthroplasty by Dr. Luis  2. Labs: Done within normal limits for surgery.  3. H&P: Seen by Dr. Sierra 1/22/2020 right before I saw him today.    Patient was cleared by Dr. Sierra today. Patient is diabetic type II with a hemoglobin A1c of 7.5, obesity, hyperlipidemia, cardiovascular disease, hypertension.  4. Covid test: 1/30/2021  5. DVT prophylaxis: Xeralto 10mg x 14 days.  He is very worried about this as he had some bleeding on his last surgery in 2016 but has agreed to do the Xarelto.  6. Dispo: Discharge postop day 1 to his mother's house that is set up for handicap access.  Home physical therapy at his mother's house.      This note was dictated with [employer] [facility].    Ruy Mane PA-C

## 2021-01-22 ENCOUNTER — OFFICE VISIT (OUTPATIENT)
Dept: INTERNAL MEDICINE | Facility: CLINIC | Age: 61
End: 2021-01-22
Payer: COMMERCIAL

## 2021-01-22 ENCOUNTER — OFFICE VISIT (OUTPATIENT)
Dept: ORTHOPEDICS | Facility: CLINIC | Age: 61
End: 2021-01-22
Payer: COMMERCIAL

## 2021-01-22 VITALS
OXYGEN SATURATION: 97 % | WEIGHT: 271 LBS | RESPIRATION RATE: 24 BRPM | BODY MASS INDEX: 38.33 KG/M2 | DIASTOLIC BLOOD PRESSURE: 87 MMHG | TEMPERATURE: 97.9 F | SYSTOLIC BLOOD PRESSURE: 150 MMHG | HEART RATE: 92 BPM

## 2021-01-22 VITALS
WEIGHT: 271 LBS | DIASTOLIC BLOOD PRESSURE: 82 MMHG | HEIGHT: 71 IN | BODY MASS INDEX: 37.94 KG/M2 | SYSTOLIC BLOOD PRESSURE: 138 MMHG

## 2021-01-22 DIAGNOSIS — M16.11 PRIMARY OSTEOARTHRITIS OF RIGHT HIP: ICD-10-CM

## 2021-01-22 DIAGNOSIS — E78.5 HYPERLIPIDEMIA LDL GOAL <100: ICD-10-CM

## 2021-01-22 DIAGNOSIS — I50.42 CHRONIC COMBINED SYSTOLIC AND DIASTOLIC CONGESTIVE HEART FAILURE (H): ICD-10-CM

## 2021-01-22 DIAGNOSIS — I48.4 ATYPICAL ATRIAL FLUTTER (H): ICD-10-CM

## 2021-01-22 DIAGNOSIS — Z95.810 AUTOMATIC IMPLANTABLE CARDIOVERTER-DEFIBRILLATOR IN SITU: ICD-10-CM

## 2021-01-22 DIAGNOSIS — E11.65 TYPE 2 DIABETES MELLITUS WITH HYPERGLYCEMIA, WITHOUT LONG-TERM CURRENT USE OF INSULIN (H): ICD-10-CM

## 2021-01-22 DIAGNOSIS — Z95.1 STATUS POST AORTO-CORONARY ARTERY BYPASS GRAFT: ICD-10-CM

## 2021-01-22 DIAGNOSIS — E66.01 MORBID OBESITY (H): ICD-10-CM

## 2021-01-22 DIAGNOSIS — I25.5 ISCHEMIC CARDIOMYOPATHY: ICD-10-CM

## 2021-01-22 DIAGNOSIS — Z01.818 PREOP GENERAL PHYSICAL EXAM: Primary | ICD-10-CM

## 2021-01-22 DIAGNOSIS — M16.11 PRIMARY OSTEOARTHRITIS OF RIGHT HIP: Primary | ICD-10-CM

## 2021-01-22 DIAGNOSIS — Z12.5 SCREENING FOR PROSTATE CANCER: ICD-10-CM

## 2021-01-22 PROBLEM — I50.9 CHF (CONGESTIVE HEART FAILURE) (H): Status: ACTIVE | Noted: 2021-01-22

## 2021-01-22 LAB
ANION GAP SERPL CALCULATED.3IONS-SCNC: 7 MMOL/L (ref 3–14)
BASOPHILS # BLD AUTO: 0.1 10E9/L (ref 0–0.2)
BASOPHILS NFR BLD AUTO: 1.2 %
BUN SERPL-MCNC: 21 MG/DL (ref 7–30)
CALCIUM SERPL-MCNC: 9.4 MG/DL (ref 8.5–10.1)
CHLORIDE SERPL-SCNC: 103 MMOL/L (ref 94–109)
CHOLEST SERPL-MCNC: 208 MG/DL
CO2 SERPL-SCNC: 28 MMOL/L (ref 20–32)
CREAT SERPL-MCNC: 0.62 MG/DL (ref 0.66–1.25)
DIFFERENTIAL METHOD BLD: NORMAL
EOSINOPHIL NFR BLD AUTO: 4.8 %
ERYTHROCYTE [DISTWIDTH] IN BLOOD BY AUTOMATED COUNT: 11.9 % (ref 10–15)
GFR SERPL CREATININE-BSD FRML MDRD: >90 ML/MIN/{1.73_M2}
GLUCOSE SERPL-MCNC: 141 MG/DL (ref 70–99)
HBA1C MFR BLD: 7.5 % (ref 0–5.6)
HCT VFR BLD AUTO: 43.6 % (ref 40–53)
HDLC SERPL-MCNC: 34 MG/DL
HGB BLD-MCNC: 15.5 G/DL (ref 13.3–17.7)
IMM GRANULOCYTES # BLD: 0 10E9/L (ref 0–0.4)
IMM GRANULOCYTES NFR BLD: 0.2 %
LDLC SERPL CALC-MCNC: 128 MG/DL
LYMPHOCYTES # BLD AUTO: 2.6 10E9/L (ref 0.8–5.3)
LYMPHOCYTES NFR BLD AUTO: 28.1 %
MCH RBC QN AUTO: 32.4 PG (ref 26.5–33)
MCHC RBC AUTO-ENTMCNC: 35.6 G/DL (ref 31.5–36.5)
MCV RBC AUTO: 91 FL (ref 78–100)
MONOCYTES # BLD AUTO: 0.8 10E9/L (ref 0–1.3)
MONOCYTES NFR BLD AUTO: 8.3 %
NEUTROPHILS # BLD AUTO: 5.4 10E9/L (ref 1.6–8.3)
NEUTROPHILS NFR BLD AUTO: 57.4 %
NONHDLC SERPL-MCNC: 174 MG/DL
NRBC # BLD AUTO: 0 10*3/UL
NRBC BLD AUTO-RTO: 0 /100
PLATELET # BLD AUTO: 247 10E9/L (ref 150–450)
POTASSIUM SERPL-SCNC: 4.1 MMOL/L (ref 3.4–5.3)
PSA SERPL-ACNC: 7.14 UG/L (ref 0–4)
RBC # BLD AUTO: 4.79 10E12/L (ref 4.4–5.9)
SODIUM SERPL-SCNC: 138 MMOL/L (ref 133–144)
TRIGL SERPL-MCNC: 230 MG/DL
WBC # BLD AUTO: 9.3 10E9/L (ref 4–11)

## 2021-01-22 PROCEDURE — 99207 PR PREOP VISIT IN GLOBAL PKG: CPT | Performed by: PHYSICIAN ASSISTANT

## 2021-01-22 PROCEDURE — 80048 BASIC METABOLIC PNL TOTAL CA: CPT | Performed by: INTERNAL MEDICINE

## 2021-01-22 PROCEDURE — 99214 OFFICE O/P EST MOD 30 MIN: CPT | Performed by: INTERNAL MEDICINE

## 2021-01-22 PROCEDURE — G0103 PSA SCREENING: HCPCS | Performed by: INTERNAL MEDICINE

## 2021-01-22 PROCEDURE — 93000 ELECTROCARDIOGRAM COMPLETE: CPT | Performed by: INTERNAL MEDICINE

## 2021-01-22 PROCEDURE — 83036 HEMOGLOBIN GLYCOSYLATED A1C: CPT | Performed by: INTERNAL MEDICINE

## 2021-01-22 PROCEDURE — 85025 COMPLETE CBC W/AUTO DIFF WBC: CPT | Performed by: INTERNAL MEDICINE

## 2021-01-22 PROCEDURE — 80061 LIPID PANEL: CPT | Performed by: INTERNAL MEDICINE

## 2021-01-22 PROCEDURE — 36415 COLL VENOUS BLD VENIPUNCTURE: CPT | Performed by: INTERNAL MEDICINE

## 2021-01-22 ASSESSMENT — MIFFLIN-ST. JEOR: SCORE: 2048.44

## 2021-01-22 ASSESSMENT — PAIN SCALES - GENERAL
PAINLEVEL: MODERATE PAIN (5)
PAINLEVEL: MODERATE PAIN (5)

## 2021-01-22 NOTE — H&P (VIEW-ONLY)
[address] [address]  Phone: [phone]  Primary Provider: Raymond Sierra  Pre-op Performing Provider: RAYMOND SIERRA    PREOPERATIVE EVALUATION:  Today's date: 1/22/2021    Fredy Clark is a 60 year old male who presents for a preoperative evaluation.    Surgical Information:  Surgery/Procedure: 2/2/21  Surgery Location:   Surgeon: Toby  Surgery Date: 2/2/21  Time of Surgery: 1045am  Where patient plans to recover: At home with family  Fax number for surgical facility: Note does not need to be faxed, will be available electronically in Epic.    Type of Anesthesia Anticipated: General    Subjective     HPI related to upcoming procedure:     Preop Questions 1/22/2021   1. Have you ever had a heart attack or stroke? YES    2. Have you ever had surgery on your heart or blood vessels, such as a stent placement, a coronary artery bypass, or surgery on an artery in your head, neck, heart, or legs? YES    3. Do you have chest pain with activity? No   4. Do you have a history of  heart failure? YES    5. Do you currently have a cold, bronchitis or symptoms of other infection? No   6. Do you have a cough, shortness of breath, or wheezing? No   7. Do you or anyone in your family have previous history of blood clots? No   8. Do you or does anyone in your family have a serious bleeding problem such as prolonged bleeding following surgeries or cuts? No   9. Have you ever had problems with anemia or been told to take iron pills? No   10. Have you had any abnormal blood loss such as black, tarry or bloody stools? No   11. Have you ever had a blood transfusion? No   12. Are you willing to have a blood transfusion if it is medically needed before, during, or after your surgery? NO    13. Have you or any of your relatives ever had problems with anesthesia? No   14. Do you have sleep apnea, excessive snoring or daytime drowsiness? No   15. Do  you have any artifical heart valves or other implanted medical devices like a pacemaker, defibrillator, or continuous glucose monitor? YES    15a. What type of device do you have? defib   15b. Name of the clinic that manages your device:  [facility] [place]   16. Do you have artificial joints? YES    17. Are you allergic to latex? No       Health Care Directive:  Patient does not have a Health Care Directive or Living Will: Discussed advance care planning with patient; however, patient declined at this time.    Preoperative Review of :   reviewed - no record of controlled substances prescribed.      Status of Chronic Conditions:  See problem list for active medical problems.  Problems all longstanding and stable, except as noted/documented.  See ROS for pertinent symptoms related to these conditions.      Review of Systems  CONSTITUTIONAL: NEGATIVE for fever, chills, change in weight  INTEGUMENTARY/SKIN: NEGATIVE for worrisome rashes, moles or lesions  EYES: NEGATIVE for vision changes or irritation  ENT/MOUTH: NEGATIVE for ear, mouth and throat problems  RESP:Patient does have dyspnea with exertion.  No orthopnea displays history of cardiomyopathy.  BREAST: NEGATIVE for masses, tenderness or discharge  CV: NEGATIVE for chest pain, palpitations or peripheral edema  GI: NEGATIVE for nausea, abdominal pain, heartburn, or change in bowel habits  : NEGATIVE for frequency, dysuria, or hematuria  MUSCULOSKELETAL: NEGATIVE for significant arthralgias or myalgia  NEURO: NEGATIVE for weakness, dizziness or paresthesias  ENDOCRINE: NEGATIVE for temperature intolerance, skin/hair changes  HEME: NEGATIVE for bleeding problems  PSYCHIATRIC: NEGATIVE for changes in mood or affect    Patient Active Problem List    Diagnosis Date Noted     Primary osteoarthritis of right hip 02/15/2019     Priority: Medium     Obesity (BMI 35.0-39.9) with comorbidity (H) 12/17/2018     Priority: Medium     Benign essential hypertension 07/18/2016      Priority: Medium     Status post total hip replacement, left 01/05/2016     Priority: Medium     Obesity, Class II, BMI 35-39.9, with comorbidity 10/26/2015     Priority: Medium     Type 2 diabetes mellitus without complication (H) 10/31/2010     Priority: Medium     Hyperlipidemia LDL goal <100 10/31/2010     Priority: Medium     Cardiovascular disease 03/29/2006     Priority: Medium     Problem list name updated by automated process. Provider to review        Past Medical History:   Diagnosis Date     Coronary atherosclerosis of unspecified type of vessel, native or graft     Coronary artery disease     Obesity, Class II, BMI 35-39.9, with comorbidity 10/26/2015     Obesity, unspecified      Other and unspecified hyperlipidemia      Type II or unspecified type diabetes mellitus without mention of complication, not stated as uncontrolled      Unspecified essential hypertension      Past Surgical History:   Procedure Laterality Date     ARTHROPLASTY HIP Left 1/5/2016    Procedure: ARTHROPLASTY HIP;  Surgeon: Dejon Luis DO;  Location: [facility] OR     C ANESTH,CARDIOVERTER/DEFIB  01/2001    Implant defibrillator, 06/2006 - pulse generator change serial # [id number]     C CABG, ARTERY-VEIN, FIVE       HC PPM GENERATOR REMOVAL  06/16/06    [facility]- removed Medtronic [id number] replaced with Medtronic Entrust#[id number]     Current Outpatient Medications   Medication Sig Dispense Refill     aspirin (ASA) 325 MG EC tablet Take 325 mg by mouth every 6 hours as needed for moderate pain       CAYENNE 500 MG OR CAPS 1 tab twice daily  0     lisinopril-hydrochlorothiazide (ZESTORETIC) 20-25 MG tablet TAKE TWO TABLETS BY MOUTH DAILY 180 tablet 1     metFORMIN (GLUCOPHAGE) 1000 MG tablet TAKE ONE TABLET BY MOUTH TWICE A DAY WITH MEALS 60 tablet 3     MULTIVITAMIN TABS   OR 1 tab daily  0     OMEGA-3 CAPS   OR 1200 mg.--1 tab bid  0     VITAMIN C 500 MG OR TABS 1 tab daily 60 0       Allergies   Allergen Reactions      No Known Drug Allergies         Social History     Tobacco Use     Smoking status: Former Smoker     Packs/day: 2.00     Years: 20.00     Pack years: 40.00     Types: Cigarettes     Quit date: 1999     Years since quittin.6     Smokeless tobacco: Former User   Substance Use Topics     Alcohol use: Yes     Alcohol/week: 0.0 standard drinks     Comment: very little     History reviewed. No pertinent family history.  History   Drug Use No         Objective     BP (!) 145/90 (BP Location: Left arm, Patient Position: Sitting, Cuff Size: Adult Regular)   Pulse 92   Temp 97.9  F (36.6  C) (Temporal)   Resp 24   Wt 122.9 kg (271 lb)   SpO2 97%   BMI 38.33 kg/m      Physical Exam    GENERAL APPEARANCE: healthy, alert and no distress     EYES: EOMI,  PERRL     HENT: ear canals and TM's normal and nose and mouth without ulcers or lesions     NECK: no adenopathy, no asymmetry, masses, or scars and thyroid normal to palpation     RESP: lungs clear to auscultation - no rales, rhonchi or wheezes     CV: regular rates and rhythm, normal S1 S2, no S3 or S4 and no murmur, click or rub     ABDOMEN:  soft, nontender, no HSM or masses and bowel sounds normal     MS: extremities normal- no gross deformities noted, no evidence of inflammation in joints, decreased range of motion of the right hip secondary to pain.  Crepitance in that hip is noted with motion.     SKIN: no suspicious lesions or rashes     NEURO: Normal strength and tone, sensory exam grossly normal, mentation intact and speech normal     PSYCH: mentation appears normal. and affect normal/bright     LYMPHATICS: No cervical adenopathy      Ref Range & Units 3d ago    WBC 4.0 - 11.0 10e9/L 9.3     RBC Count 4.4 - 5.9 10e12/L 4.79     Hemoglobin 13.3 - 17.7 g/dL 15.5     Hematocrit 40.0 - 53.0 % 43.6     MCV 78 - 100 fl 91     MCH 26.5 - 33.0 pg 32.4     MCHC 31.5 - 36.5 g/dL 35.6     RDW 10.0 - 15.0 % 11.9     Platelet Count 150 - 450 10e9/L 247     Diff  Method  Automated Method     % Neutrophils % 57.4     % Lymphocytes % 28.1     % Monocytes % 8.3     % Eosinophils % 4.8     % Basophils % 1.2     % Immature Granulocytes % 0.2     Nucleated RBCs 0 /100 0     Absolute Neutrophil 1.6 - 8.3 10e9/L 5.4     Absolute Lymphocytes 0.8 - 5.3 10e9/L 2.6     Absolute Monocytes 0.0 - 1.3 10e9/L 0.8     Absolute Basophils 0.0 - 0.2 10e9/L 0.1     Abs Immature Granulocytes 0 - 0.4 10e9/L 0.0     Absolute Nucleated RBC  0.0       Sodium 133 - 144 mmol/L 138     Potassium 3.4 - 5.3 mmol/L 4.1     Chloride 94 - 109 mmol/L 103     Carbon Dioxide 20 - 32 mmol/L 28     Anion Gap 3 - 14 mmol/L 7     Glucose 70 - 99 mg/dL 141High     Comment: Non Fasting    Urea Nitrogen 7 - 30 mg/dL 21     Creatinine 0.66 - 1.25 mg/dL 0.62Low      GFR Estimate >60 mL/min/ >90    Comment: Non  GFR Calc   Starting 12/18/2018, serum creatinine based estimated GFR (eGFR) will be   calculated using the Chronic Kidney Disease Epidemiology Collaboration   (CKD-EPI) equation.     GFR Estimate If Black >60 mL/min/ >90    Comment:  GFR Calc   Starting 12/18/2018, serum creatinine based estimated GFR (eGFR) will be   calculated using the Chronic Kidney Disease Epidemiology Collaboration   (CKD-EPI) equation.     Calcium 8.5 - 10.1 mg/dL 9.4        Ref Range & Units 3d ago    Hemoglobin A1C 0 - 5.6 % 7.5High           Diagnostics:  Labs pending at this time.  Results will be reviewed when available.   EKG: appears normal, NSR, normal axis, normal intervals, no acute ST/T changes c/w ischemia, no LVH by voltage criteria, unchanged from previous tracings, Evidence of previous anterior myocardial infarction is seen with Q waves in the precordial leads.    Revised Cardiac Risk Index (RCRI):  The patient has the following serious cardiovascular risks for perioperative complications:   - Coronary Artery Disease (MI, positive stress test, angina, Qs on EKG) = 1 point     RCRI  Interpretation: 2 points: Class III (moderate risk - 6.6% complication rate)     Estimated Functional Capacity: Performs 4 METS exercise without symptoms (e.g., light housework, stairs, 4 mph walk, 7 mph bike, slow step dance)           Assessment & Plan   The proposed surgical procedure is considered INTERMEDIATE risk.    Preop general physical exam    Primary osteoarthritis of right hip    Type 2 diabetes mellitus with hyperglycemia, without long-term current use of insulin (H)  - Hemoglobin A1c  - Basic metabolic panel  (Ca, Cl, CO2, Creat, Gluc, K, Na, BUN)    Morbid obesity (H)    Hyperlipidemia LDL goal <100  Not on statin therapy per patient choice  - Lipid Profile    Status post aorto-coronary artery bypass graft  No recent chest pain symptoms    Ischemic cardiomyopathy  No acute signs of congestive heart failure.  Implantable defibrillator in place but nonfunctioning.    Chronic combined systolic and diastolic congestive heart failure (H)  No evidence of orthopnea.  Moderate dyspnea with  - CBC with platelets and differential  - EKG 12-lead complete w/read - Clinics    Automatic implantable cardioverter-defibrillator in situ    Atypical atrial flutter (H)  No signs.  Not on anticoagulant per patient choice    Screening for prostate cancer  Done  - Prostate spec antigen screen   Implanted Device:   - Type of device: . Patient advised to bring device information on day of surgery.   - For full details on implanted device, refer to device management note in Epic from date: .      Risks and Recommendations:  The patient has the following additional risks and recommendations for perioperative complications:  Diabetes:  - Patient is not on insulin therapy: regular NPO guidelines can be followed.   Obstructive Sleep Apnea:   Do not leave patient supine on it.    Medication Instructions:  Recommend continuing aspirin perioperatively.    Patient is to skip lisinopril the morning of the  procedure  .  RECOMMENDATION:  APPROVAL GIVEN to proceed with proposed procedure, without further diagnostic evaluation.       Excerpt from cardiology consult November 16, 2020:     4. Pre-operative clearance: patient can perform 4 METS without anginal symptoms, no valvular disease or significant decompensated heart failure or uncontrolled arrhythmias. Recommend proceeding with surgery with no further cardiac work up needed. Would continue aspirin perioperatively          Signed Electronically by: Raymond Sierra DO    Copy of this evaluation report is provided to requesting physician.    Preop [facility] Preop Guidelines    Revised Cardiac Risk Index

## 2021-01-22 NOTE — PROGRESS NOTES
[address] [address]  Phone: [phone]  Primary Provider: Raymond Sierra  Pre-op Performing Provider: ARYMOND SIERRA    PREOPERATIVE EVALUATION:  Today's date: 1/22/2021    Fredy Clark is a 60 year old male who presents for a preoperative evaluation.    Surgical Information:  Surgery/Procedure: 2/2/21  Surgery Location:   Surgeon: Toby  Surgery Date: 2/2/21  Time of Surgery: 1045am  Where patient plans to recover: At home with family  Fax number for surgical facility: Note does not need to be faxed, will be available electronically in Epic.    Type of Anesthesia Anticipated: General    Subjective     HPI related to upcoming procedure:     Preop Questions 1/22/2021   1. Have you ever had a heart attack or stroke? YES    2. Have you ever had surgery on your heart or blood vessels, such as a stent placement, a coronary artery bypass, or surgery on an artery in your head, neck, heart, or legs? YES    3. Do you have chest pain with activity? No   4. Do you have a history of  heart failure? YES    5. Do you currently have a cold, bronchitis or symptoms of other infection? No   6. Do you have a cough, shortness of breath, or wheezing? No   7. Do you or anyone in your family have previous history of blood clots? No   8. Do you or does anyone in your family have a serious bleeding problem such as prolonged bleeding following surgeries or cuts? No   9. Have you ever had problems with anemia or been told to take iron pills? No   10. Have you had any abnormal blood loss such as black, tarry or bloody stools? No   11. Have you ever had a blood transfusion? No   12. Are you willing to have a blood transfusion if it is medically needed before, during, or after your surgery? NO    13. Have you or any of your relatives ever had problems with anesthesia? No   14. Do you have sleep apnea, excessive snoring or daytime drowsiness? No   15. Do  you have any artifical heart valves or other implanted medical devices like a pacemaker, defibrillator, or continuous glucose monitor? YES    15a. What type of device do you have? defib   15b. Name of the clinic that manages your device:  [facility] [place]   16. Do you have artificial joints? YES    17. Are you allergic to latex? No       Health Care Directive:  Patient does not have a Health Care Directive or Living Will: Discussed advance care planning with patient; however, patient declined at this time.    Preoperative Review of :   reviewed - no record of controlled substances prescribed.      Status of Chronic Conditions:  See problem list for active medical problems.  Problems all longstanding and stable, except as noted/documented.  See ROS for pertinent symptoms related to these conditions.      Review of Systems  CONSTITUTIONAL: NEGATIVE for fever, chills, change in weight  INTEGUMENTARY/SKIN: NEGATIVE for worrisome rashes, moles or lesions  EYES: NEGATIVE for vision changes or irritation  ENT/MOUTH: NEGATIVE for ear, mouth and throat problems  RESP:Patient does have dyspnea with exertion.  No orthopnea displays history of cardiomyopathy.  BREAST: NEGATIVE for masses, tenderness or discharge  CV: NEGATIVE for chest pain, palpitations or peripheral edema  GI: NEGATIVE for nausea, abdominal pain, heartburn, or change in bowel habits  : NEGATIVE for frequency, dysuria, or hematuria  MUSCULOSKELETAL: NEGATIVE for significant arthralgias or myalgia  NEURO: NEGATIVE for weakness, dizziness or paresthesias  ENDOCRINE: NEGATIVE for temperature intolerance, skin/hair changes  HEME: NEGATIVE for bleeding problems  PSYCHIATRIC: NEGATIVE for changes in mood or affect    Patient Active Problem List    Diagnosis Date Noted     Primary osteoarthritis of right hip 02/15/2019     Priority: Medium     Obesity (BMI 35.0-39.9) with comorbidity (H) 12/17/2018     Priority: Medium     Benign essential hypertension 07/18/2016      Priority: Medium     Status post total hip replacement, left 01/05/2016     Priority: Medium     Obesity, Class II, BMI 35-39.9, with comorbidity 10/26/2015     Priority: Medium     Type 2 diabetes mellitus without complication (H) 10/31/2010     Priority: Medium     Hyperlipidemia LDL goal <100 10/31/2010     Priority: Medium     Cardiovascular disease 03/29/2006     Priority: Medium     Problem list name updated by automated process. Provider to review        Past Medical History:   Diagnosis Date     Coronary atherosclerosis of unspecified type of vessel, native or graft     Coronary artery disease     Obesity, Class II, BMI 35-39.9, with comorbidity 10/26/2015     Obesity, unspecified      Other and unspecified hyperlipidemia      Type II or unspecified type diabetes mellitus without mention of complication, not stated as uncontrolled      Unspecified essential hypertension      Past Surgical History:   Procedure Laterality Date     ARTHROPLASTY HIP Left 1/5/2016    Procedure: ARTHROPLASTY HIP;  Surgeon: Dejon Luis DO;  Location: [facility] OR     C ANESTH,CARDIOVERTER/DEFIB  01/2001    Implant defibrillator, 06/2006 - pulse generator change serial # [id number]     C CABG, ARTERY-VEIN, FIVE       HC PPM GENERATOR REMOVAL  06/16/06    [facility]- removed Medtronic [id number] replaced with Medtronic Entrust#[id number]     Current Outpatient Medications   Medication Sig Dispense Refill     aspirin (ASA) 325 MG EC tablet Take 325 mg by mouth every 6 hours as needed for moderate pain       CAYENNE 500 MG OR CAPS 1 tab twice daily  0     lisinopril-hydrochlorothiazide (ZESTORETIC) 20-25 MG tablet TAKE TWO TABLETS BY MOUTH DAILY 180 tablet 1     metFORMIN (GLUCOPHAGE) 1000 MG tablet TAKE ONE TABLET BY MOUTH TWICE A DAY WITH MEALS 60 tablet 3     MULTIVITAMIN TABS   OR 1 tab daily  0     OMEGA-3 CAPS   OR 1200 mg.--1 tab bid  0     VITAMIN C 500 MG OR TABS 1 tab daily 60 0       Allergies   Allergen Reactions      No Known Drug Allergies         Social History     Tobacco Use     Smoking status: Former Smoker     Packs/day: 2.00     Years: 20.00     Pack years: 40.00     Types: Cigarettes     Quit date: 1999     Years since quittin.6     Smokeless tobacco: Former User   Substance Use Topics     Alcohol use: Yes     Alcohol/week: 0.0 standard drinks     Comment: very little     History reviewed. No pertinent family history.  History   Drug Use No         Objective     BP (!) 145/90 (BP Location: Left arm, Patient Position: Sitting, Cuff Size: Adult Regular)   Pulse 92   Temp 97.9  F (36.6  C) (Temporal)   Resp 24   Wt 122.9 kg (271 lb)   SpO2 97%   BMI 38.33 kg/m      Physical Exam    GENERAL APPEARANCE: healthy, alert and no distress     EYES: EOMI,  PERRL     HENT: ear canals and TM's normal and nose and mouth without ulcers or lesions     NECK: no adenopathy, no asymmetry, masses, or scars and thyroid normal to palpation     RESP: lungs clear to auscultation - no rales, rhonchi or wheezes     CV: regular rates and rhythm, normal S1 S2, no S3 or S4 and no murmur, click or rub     ABDOMEN:  soft, nontender, no HSM or masses and bowel sounds normal     MS: extremities normal- no gross deformities noted, no evidence of inflammation in joints, decreased range of motion of the right hip secondary to pain.  Crepitance in that hip is noted with motion.     SKIN: no suspicious lesions or rashes     NEURO: Normal strength and tone, sensory exam grossly normal, mentation intact and speech normal     PSYCH: mentation appears normal. and affect normal/bright     LYMPHATICS: No cervical adenopathy      Ref Range & Units 3d ago    WBC 4.0 - 11.0 10e9/L 9.3     RBC Count 4.4 - 5.9 10e12/L 4.79     Hemoglobin 13.3 - 17.7 g/dL 15.5     Hematocrit 40.0 - 53.0 % 43.6     MCV 78 - 100 fl 91     MCH 26.5 - 33.0 pg 32.4     MCHC 31.5 - 36.5 g/dL 35.6     RDW 10.0 - 15.0 % 11.9     Platelet Count 150 - 450 10e9/L 247     Diff  Method  Automated Method     % Neutrophils % 57.4     % Lymphocytes % 28.1     % Monocytes % 8.3     % Eosinophils % 4.8     % Basophils % 1.2     % Immature Granulocytes % 0.2     Nucleated RBCs 0 /100 0     Absolute Neutrophil 1.6 - 8.3 10e9/L 5.4     Absolute Lymphocytes 0.8 - 5.3 10e9/L 2.6     Absolute Monocytes 0.0 - 1.3 10e9/L 0.8     Absolute Basophils 0.0 - 0.2 10e9/L 0.1     Abs Immature Granulocytes 0 - 0.4 10e9/L 0.0     Absolute Nucleated RBC  0.0       Sodium 133 - 144 mmol/L 138     Potassium 3.4 - 5.3 mmol/L 4.1     Chloride 94 - 109 mmol/L 103     Carbon Dioxide 20 - 32 mmol/L 28     Anion Gap 3 - 14 mmol/L 7     Glucose 70 - 99 mg/dL 141High     Comment: Non Fasting    Urea Nitrogen 7 - 30 mg/dL 21     Creatinine 0.66 - 1.25 mg/dL 0.62Low      GFR Estimate >60 mL/min/ >90    Comment: Non  GFR Calc   Starting 12/18/2018, serum creatinine based estimated GFR (eGFR) will be   calculated using the Chronic Kidney Disease Epidemiology Collaboration   (CKD-EPI) equation.     GFR Estimate If Black >60 mL/min/ >90    Comment:  GFR Calc   Starting 12/18/2018, serum creatinine based estimated GFR (eGFR) will be   calculated using the Chronic Kidney Disease Epidemiology Collaboration   (CKD-EPI) equation.     Calcium 8.5 - 10.1 mg/dL 9.4        Ref Range & Units 3d ago    Hemoglobin A1C 0 - 5.6 % 7.5High           Diagnostics:  Labs pending at this time.  Results will be reviewed when available.   EKG: appears normal, NSR, normal axis, normal intervals, no acute ST/T changes c/w ischemia, no LVH by voltage criteria, unchanged from previous tracings, Evidence of previous anterior myocardial infarction is seen with Q waves in the precordial leads.    Revised Cardiac Risk Index (RCRI):  The patient has the following serious cardiovascular risks for perioperative complications:   - Coronary Artery Disease (MI, positive stress test, angina, Qs on EKG) = 1 point     RCRI  Interpretation: 2 points: Class III (moderate risk - 6.6% complication rate)     Estimated Functional Capacity: Performs 4 METS exercise without symptoms (e.g., light housework, stairs, 4 mph walk, 7 mph bike, slow step dance)           Assessment & Plan   The proposed surgical procedure is considered INTERMEDIATE risk.    Preop general physical exam    Primary osteoarthritis of right hip    Type 2 diabetes mellitus with hyperglycemia, without long-term current use of insulin (H)  - Hemoglobin A1c  - Basic metabolic panel  (Ca, Cl, CO2, Creat, Gluc, K, Na, BUN)    Morbid obesity (H)    Hyperlipidemia LDL goal <100  Not on statin therapy per patient choice  - Lipid Profile    Status post aorto-coronary artery bypass graft  No recent chest pain symptoms    Ischemic cardiomyopathy  No acute signs of congestive heart failure.  Implantable defibrillator in place but nonfunctioning.    Chronic combined systolic and diastolic congestive heart failure (H)  No evidence of orthopnea.  Moderate dyspnea with  - CBC with platelets and differential  - EKG 12-lead complete w/read - Clinics    Automatic implantable cardioverter-defibrillator in situ    Atypical atrial flutter (H)  No signs.  Not on anticoagulant per patient choice    Screening for prostate cancer  Done  - Prostate spec antigen screen   Implanted Device:   - Type of device: . Patient advised to bring device information on day of surgery.   - For full details on implanted device, refer to device management note in Epic from date: .      Risks and Recommendations:  The patient has the following additional risks and recommendations for perioperative complications:  Diabetes:  - Patient is not on insulin therapy: regular NPO guidelines can be followed.   Obstructive Sleep Apnea:   Do not leave patient supine on it.    Medication Instructions:  Recommend continuing aspirin perioperatively.    Patient is to skip lisinopril the morning of the  procedure  .  RECOMMENDATION:  APPROVAL GIVEN to proceed with proposed procedure, without further diagnostic evaluation.       Excerpt from cardiology consult November 16, 2020:     4. Pre-operative clearance: patient can perform 4 METS without anginal symptoms, no valvular disease or significant decompensated heart failure or uncontrolled arrhythmias. Recommend proceeding with surgery with no further cardiac work up needed. Would continue aspirin perioperatively          Signed Electronically by: Raymond Sierra DO    Copy of this evaluation report is provided to requesting physician.    Preop [facility] Preop Guidelines    Revised Cardiac Risk Index

## 2021-01-22 NOTE — LETTER
1/22/2021         RE: Fredy Clark  [address] [facility] [address]        Dear Colleague,    Thank you for referring your patient, Fredy Clark, to the [facility]. Please see a copy of my visit note below.    One Week Prior to Total Joint    1. Surgery: 2/2/2021 right total hip arthroplasty by Dr. Luis  2. Labs: Done within normal limits for surgery.  3. H&P: Seen by Dr. Sierra 1/22/2020 right before I saw him today.    Patient was cleared by Dr. Sierra today. Patient is diabetic type II with a hemoglobin A1c of 7.5, obesity, hyperlipidemia, cardiovascular disease, hypertension.  4. Covid test: 1/30/2021  5. DVT prophylaxis: Xeralto 10mg x 14 days.  He is very worried about this as he had some bleeding on his last surgery in 2016 but has agreed to do the Xarelto.  6. Dispo: Discharge postop day 1 to his mother's house that is set up for handicap access.  Home physical therapy at his mother's house.      [facility].    Ruy Mane PA-C              Again, thank you for allowing me to participate in the care of your patient.        Sincerely,        Ruy Mane PA-C

## 2021-01-22 NOTE — LETTER
January 28, 2021      Fredy Clark  [address] [facility] [address]      Dear Fredy, your recent test results are attached.       The cholesterol is slightly elevated with an LDL of 128.   Chemistry panel shows an elevated blood sugar 141.  Kidney function is normal.   The hemoglobin A1c is stable at 7.5.  This is slightly higher than desired but not bad.   The blood cell count is normal without evidence of anemia or leukemia.     The prostate-specific antigen has gone up slightly at 7.14.  This is still less than it was a year ago.     Given the lack of progression in the PSA, I would recommend repeating it in 6 months.  At this time, it does not suggest prostate cancer.     You will be contacted with any outstanding results as they are available.   Feel free to contact me via the office or My Chart if you have any questions regarding the above.       Resulted Orders   Hemoglobin A1c   Result Value Ref Range    Hemoglobin A1C 7.5 (H) 0 - 5.6 %      Comment:      Normal <5.7% Prediabetes 5.7-6.4%  Diabetes 6.5% or higher - adopted from ADA   consensus guidelines.     Lipid Profile   Result Value Ref Range    Cholesterol 208 (H) <200 mg/dL      Comment:      Desirable:       <200 mg/dl    Triglycerides 230 (H) <150 mg/dL      Comment:      Borderline high:  150-199 mg/dl  High:             200-499 mg/dl  Very high:       >499 mg/dl  Non Fasting      HDL Cholesterol 34 (L) >39 mg/dL    LDL Cholesterol Calculated 128 (H) <100 mg/dL      Comment:      Above desirable:  100-129 mg/dl  Borderline High:  130-159 mg/dL  High:             160-189 mg/dL  Very high:       >189 mg/dl      Non HDL Cholesterol 174 (H) <130 mg/dL      Comment:      Above Desirable:  130-159 mg/dl  Borderline high:  160-189 mg/dl  High:             190-219 mg/dl  Very high:       >219 mg/dl     Basic metabolic panel  (Ca, Cl, CO2, Creat, Gluc, K, Na, BUN)   Result Value Ref Range    Sodium 138 133 - 144 mmol/L    Potassium 4.1 3.4 - 5.3  mmol/L    Chloride 103 94 - 109 mmol/L    Carbon Dioxide 28 20 - 32 mmol/L    Anion Gap 7 3 - 14 mmol/L    Glucose 141 (H) 70 - 99 mg/dL      Comment:      Non Fasting    Urea Nitrogen 21 7 - 30 mg/dL    Creatinine 0.62 (L) 0.66 - 1.25 mg/dL    GFR Estimate >90 >60 mL/min/[1.73_m2]      Comment:      Non  GFR Calc  Starting 12/18/2018, serum creatinine based estimated GFR (eGFR) will be   calculated using the Chronic Kidney Disease Epidemiology Collaboration   (CKD-EPI) equation.      GFR Estimate If Black >90 >60 mL/min/[1.73_m2]      Comment:       GFR Calc  Starting 12/18/2018, serum creatinine based estimated GFR (eGFR) will be   calculated using the Chronic Kidney Disease Epidemiology Collaboration   (CKD-EPI) equation.      Calcium 9.4 8.5 - 10.1 mg/dL   Prostate spec antigen screen   Result Value Ref Range    PSA 7.14 (H) 0 - 4 ug/L      Comment:      Assay Method:  Chemiluminescence using Siemens Vista analyzer   CBC with platelets and differential   Result Value Ref Range    WBC 9.3 4.0 - 11.0 10e9/L    RBC Count 4.79 4.4 - 5.9 10e12/L    Hemoglobin 15.5 13.3 - 17.7 g/dL    Hematocrit 43.6 40.0 - 53.0 %    MCV 91 78 - 100 fl    MCH 32.4 26.5 - 33.0 pg    MCHC 35.6 31.5 - 36.5 g/dL    RDW 11.9 10.0 - 15.0 %    Platelet Count 247 150 - 450 10e9/L    Diff Method Automated Method     % Neutrophils 57.4 %    % Lymphocytes 28.1 %    % Monocytes 8.3 %    % Eosinophils 4.8 %    % Basophils 1.2 %    % Immature Granulocytes 0.2 %    Nucleated RBCs 0 0 /100    Absolute Neutrophil 5.4 1.6 - 8.3 10e9/L    Absolute Lymphocytes 2.6 0.8 - 5.3 10e9/L    Absolute Monocytes 0.8 0.0 - 1.3 10e9/L    Absolute Basophils 0.1 0.0 - 0.2 10e9/L    Abs Immature Granulocytes 0.0 0 - 0.4 10e9/L    Absolute Nucleated RBC 0.0        If you have any questions or concerns, please call the clinic at the number listed above.       Sincerely,      Raymond Sierra, DO           Yes-Patient/Caregiver accepts free interpretation services...

## 2021-01-30 DIAGNOSIS — Z11.59 ENCOUNTER FOR SCREENING FOR OTHER VIRAL DISEASES: ICD-10-CM

## 2021-01-30 LAB
SARS-COV-2 RNA RESP QL NAA+PROBE: NORMAL
SPECIMEN SOURCE: NORMAL

## 2021-01-30 PROCEDURE — U0005 INFEC AGEN DETEC AMPLI PROBE: HCPCS | Performed by: ORTHOPAEDIC SURGERY

## 2021-01-30 PROCEDURE — U0003 INFECTIOUS AGENT DETECTION BY NUCLEIC ACID (DNA OR RNA); SEVERE ACUTE RESPIRATORY SYNDROME CORONAVIRUS 2 (SARS-COV-2) (CORONAVIRUS DISEASE [COVID-19]), AMPLIFIED PROBE TECHNIQUE, MAKING USE OF HIGH THROUGHPUT TECHNOLOGIES AS DESCRIBED BY CMS-2020-01-R: HCPCS | Performed by: ORTHOPAEDIC SURGERY

## 2021-01-31 LAB
LABORATORY COMMENT REPORT: NORMAL
SARS-COV-2 RNA RESP QL NAA+PROBE: NEGATIVE
SPECIMEN SOURCE: NORMAL

## 2021-02-02 ENCOUNTER — ANESTHESIA EVENT (OUTPATIENT)
Dept: SURGERY | Facility: CLINIC | Age: 61
End: 2021-02-02
Payer: COMMERCIAL

## 2021-02-02 ENCOUNTER — APPOINTMENT (OUTPATIENT)
Dept: GENERAL RADIOLOGY | Facility: CLINIC | Age: 61
End: 2021-02-02
Attending: NURSE PRACTITIONER
Payer: COMMERCIAL

## 2021-02-02 ENCOUNTER — ANESTHESIA (OUTPATIENT)
Dept: SURGERY | Facility: CLINIC | Age: 61
End: 2021-02-02
Payer: COMMERCIAL

## 2021-02-02 ENCOUNTER — APPOINTMENT (OUTPATIENT)
Dept: PHYSICAL THERAPY | Facility: CLINIC | Age: 61
End: 2021-02-02
Attending: NURSE PRACTITIONER
Payer: COMMERCIAL

## 2021-02-02 ENCOUNTER — HOSPITAL ENCOUNTER (OUTPATIENT)
Facility: CLINIC | Age: 61
Discharge: HOME | End: 2021-02-03
Attending: ORTHOPAEDIC SURGERY | Admitting: ORTHOPAEDIC SURGERY
Payer: COMMERCIAL

## 2021-02-02 DIAGNOSIS — M16.11 PRIMARY OSTEOARTHRITIS OF RIGHT HIP: ICD-10-CM

## 2021-02-02 DIAGNOSIS — Z96.641 STATUS POST TOTAL REPLACEMENT OF RIGHT HIP: Primary | ICD-10-CM

## 2021-02-02 DIAGNOSIS — I10 BENIGN ESSENTIAL HYPERTENSION: ICD-10-CM

## 2021-02-02 PROBLEM — Z96.649 S/P TOTAL HIP ARTHROPLASTY: Status: ACTIVE | Noted: 2021-02-02

## 2021-02-02 LAB
GLUCOSE BLDC GLUCOMTR-MCNC: 149 MG/DL (ref 70–99)
GLUCOSE BLDC GLUCOMTR-MCNC: 162 MG/DL (ref 70–99)

## 2021-02-02 PROCEDURE — 258N000003 HC RX IP 258 OP 636: Performed by: NURSE ANESTHETIST, CERTIFIED REGISTERED

## 2021-02-02 PROCEDURE — 360N000077 HC SURGERY LEVEL 4, PER MIN: Performed by: ORTHOPAEDIC SURGERY

## 2021-02-02 PROCEDURE — 258N000003 HC RX IP 258 OP 636: Performed by: ORTHOPAEDIC SURGERY

## 2021-02-02 PROCEDURE — 250N000011 HC RX IP 250 OP 636: Performed by: NURSE ANESTHETIST, CERTIFIED REGISTERED

## 2021-02-02 PROCEDURE — 272N000001 HC OR GENERAL SUPPLY STERILE: Performed by: ORTHOPAEDIC SURGERY

## 2021-02-02 PROCEDURE — 97161 PT EVAL LOW COMPLEX 20 MIN: CPT | Mod: GP | Performed by: PHYSICAL THERAPIST

## 2021-02-02 PROCEDURE — 250N000011 HC RX IP 250 OP 636: Performed by: ORTHOPAEDIC SURGERY

## 2021-02-02 PROCEDURE — 97110 THERAPEUTIC EXERCISES: CPT | Mod: GP | Performed by: PHYSICAL THERAPIST

## 2021-02-02 PROCEDURE — 999N001017 HC STATISTIC GLUCOSE BY METER IP

## 2021-02-02 PROCEDURE — 27130 TOTAL HIP ARTHROPLASTY: CPT | Mod: RT | Performed by: ORTHOPAEDIC SURGERY

## 2021-02-02 PROCEDURE — 250N000009 HC RX 250: Performed by: ORTHOPAEDIC SURGERY

## 2021-02-02 PROCEDURE — 250N000013 HC RX MED GY IP 250 OP 250 PS 637: Performed by: ORTHOPAEDIC SURGERY

## 2021-02-02 PROCEDURE — C1776 JOINT DEVICE (IMPLANTABLE): HCPCS | Performed by: ORTHOPAEDIC SURGERY

## 2021-02-02 PROCEDURE — 250N000011 HC RX IP 250 OP 636: Performed by: NURSE PRACTITIONER

## 2021-02-02 PROCEDURE — 250N000009 HC RX 250: Performed by: NURSE ANESTHETIST, CERTIFIED REGISTERED

## 2021-02-02 PROCEDURE — 710N000010 HC RECOVERY PHASE 1, LEVEL 2, PER MIN: Performed by: ORTHOPAEDIC SURGERY

## 2021-02-02 PROCEDURE — 97530 THERAPEUTIC ACTIVITIES: CPT | Mod: GP | Performed by: PHYSICAL THERAPIST

## 2021-02-02 PROCEDURE — 258N000003 HC RX IP 258 OP 636: Performed by: NURSE PRACTITIONER

## 2021-02-02 PROCEDURE — 250N000013 HC RX MED GY IP 250 OP 250 PS 637: Performed by: NURSE PRACTITIONER

## 2021-02-02 PROCEDURE — 999N000065 XR PELVIS AD HIP PORTABLE RIGHT 1 VIEW

## 2021-02-02 PROCEDURE — 999N000141 HC STATISTIC PRE-PROCEDURE NURSING ASSESSMENT: Performed by: ORTHOPAEDIC SURGERY

## 2021-02-02 PROCEDURE — 370N000017 HC ANESTHESIA TECHNICAL FEE, PER MIN: Performed by: ORTHOPAEDIC SURGERY

## 2021-02-02 PROCEDURE — 27130 TOTAL HIP ARTHROPLASTY: CPT | Mod: AS | Performed by: NURSE PRACTITIONER

## 2021-02-02 DEVICE — IMPLANTABLE DEVICE: Type: IMPLANTABLE DEVICE | Site: HIP | Status: FUNCTIONAL

## 2021-02-02 RX ORDER — SODIUM CHLORIDE 9 MG/ML
INJECTION, SOLUTION INTRAVENOUS CONTINUOUS
Status: DISCONTINUED | OUTPATIENT
Start: 2021-02-02 | End: 2021-02-03 | Stop reason: HOSPADM

## 2021-02-02 RX ORDER — OXYCODONE HYDROCHLORIDE 5 MG/1
5-10 TABLET ORAL
Status: DISCONTINUED | OUTPATIENT
Start: 2021-02-02 | End: 2021-02-03 | Stop reason: HOSPADM

## 2021-02-02 RX ORDER — NALOXONE HYDROCHLORIDE 0.4 MG/ML
0.4 INJECTION, SOLUTION INTRAMUSCULAR; INTRAVENOUS; SUBCUTANEOUS
Status: DISCONTINUED | OUTPATIENT
Start: 2021-02-02 | End: 2021-02-03 | Stop reason: HOSPADM

## 2021-02-02 RX ORDER — NALOXONE HYDROCHLORIDE 0.4 MG/ML
0.2 INJECTION, SOLUTION INTRAMUSCULAR; INTRAVENOUS; SUBCUTANEOUS
Status: DISCONTINUED | OUTPATIENT
Start: 2021-02-02 | End: 2021-02-03 | Stop reason: HOSPADM

## 2021-02-02 RX ORDER — PROCHLORPERAZINE MALEATE 5 MG
10 TABLET ORAL EVERY 6 HOURS PRN
Status: DISCONTINUED | OUTPATIENT
Start: 2021-02-02 | End: 2021-02-03 | Stop reason: HOSPADM

## 2021-02-02 RX ORDER — FENTANYL CITRATE-0.9 % NACL/PF 10 MCG/ML
PLASTIC BAG, INJECTION (ML) INTRAVENOUS CONTINUOUS PRN
Status: DISCONTINUED | OUTPATIENT
Start: 2021-02-02 | End: 2021-02-03

## 2021-02-02 RX ORDER — PROPOFOL 10 MG/ML
INJECTION, EMULSION INTRAVENOUS CONTINUOUS PRN
Status: DISCONTINUED | OUTPATIENT
Start: 2021-02-02 | End: 2021-02-03

## 2021-02-02 RX ORDER — FENTANYL CITRATE 50 UG/ML
INJECTION, SOLUTION INTRAMUSCULAR; INTRAVENOUS PRN
Status: DISCONTINUED | OUTPATIENT
Start: 2021-02-02 | End: 2021-02-02 | Stop reason: ALTCHOICE

## 2021-02-02 RX ORDER — FAMOTIDINE 20 MG/1
20 TABLET, FILM COATED ORAL 2 TIMES DAILY
Status: DISCONTINUED | OUTPATIENT
Start: 2021-02-02 | End: 2021-02-03 | Stop reason: HOSPADM

## 2021-02-02 RX ORDER — ACETAMINOPHEN 325 MG/1
975 TABLET ORAL EVERY 8 HOURS
Status: DISCONTINUED | OUTPATIENT
Start: 2021-02-02 | End: 2021-02-03 | Stop reason: HOSPADM

## 2021-02-02 RX ORDER — ALBUTEROL SULFATE 0.83 MG/ML
2.5 SOLUTION RESPIRATORY (INHALATION) EVERY 4 HOURS PRN
Status: DISCONTINUED | OUTPATIENT
Start: 2021-02-02 | End: 2021-02-02 | Stop reason: HOSPADM

## 2021-02-02 RX ORDER — HYDROMORPHONE HYDROCHLORIDE 1 MG/ML
.3-.5 INJECTION, SOLUTION INTRAMUSCULAR; INTRAVENOUS; SUBCUTANEOUS EVERY 10 MIN PRN
Status: DISCONTINUED | OUTPATIENT
Start: 2021-02-02 | End: 2021-02-02 | Stop reason: HOSPADM

## 2021-02-02 RX ORDER — LIDOCAINE 40 MG/G
CREAM TOPICAL
Status: DISCONTINUED | OUTPATIENT
Start: 2021-02-02 | End: 2021-02-03 | Stop reason: HOSPADM

## 2021-02-02 RX ORDER — NALOXONE HYDROCHLORIDE 0.4 MG/ML
0.2 INJECTION, SOLUTION INTRAMUSCULAR; INTRAVENOUS; SUBCUTANEOUS
Status: DISCONTINUED | OUTPATIENT
Start: 2021-02-02 | End: 2021-02-02 | Stop reason: HOSPADM

## 2021-02-02 RX ORDER — CEFAZOLIN SODIUM 2 G/100ML
2 INJECTION, SOLUTION INTRAVENOUS EVERY 8 HOURS
Status: COMPLETED | OUTPATIENT
Start: 2021-02-02 | End: 2021-02-03

## 2021-02-02 RX ORDER — NALOXONE HYDROCHLORIDE 0.4 MG/ML
0.4 INJECTION, SOLUTION INTRAMUSCULAR; INTRAVENOUS; SUBCUTANEOUS
Status: DISCONTINUED | OUTPATIENT
Start: 2021-02-02 | End: 2021-02-02 | Stop reason: HOSPADM

## 2021-02-02 RX ORDER — CEFAZOLIN SODIUM 1 G/3ML
1 INJECTION, POWDER, FOR SOLUTION INTRAMUSCULAR; INTRAVENOUS SEE ADMIN INSTRUCTIONS
Status: DISCONTINUED | OUTPATIENT
Start: 2021-02-02 | End: 2021-02-02 | Stop reason: HOSPADM

## 2021-02-02 RX ORDER — LISINOPRIL AND HYDROCHLOROTHIAZIDE 20; 25 MG/1; MG/1
TABLET ORAL
Qty: 180 TABLET | Refills: 1 | COMMUNITY
Start: 2021-02-04 | End: 2021-07-02

## 2021-02-02 RX ORDER — HYDROMORPHONE HYDROCHLORIDE 1 MG/ML
0.4 INJECTION, SOLUTION INTRAMUSCULAR; INTRAVENOUS; SUBCUTANEOUS
Status: DISCONTINUED | OUTPATIENT
Start: 2021-02-02 | End: 2021-02-03 | Stop reason: HOSPADM

## 2021-02-02 RX ORDER — HYDROXYZINE HYDROCHLORIDE 25 MG/1
25 TABLET, FILM COATED ORAL EVERY 6 HOURS PRN
Qty: 30 TABLET | Refills: 1 | Status: SHIPPED | OUTPATIENT
Start: 2021-02-02

## 2021-02-02 RX ORDER — ACETAMINOPHEN 325 MG/1
975 TABLET ORAL EVERY 8 HOURS PRN
Qty: 100 TABLET | Refills: 1 | Status: SHIPPED | OUTPATIENT
Start: 2021-02-02

## 2021-02-02 RX ORDER — ONDANSETRON 4 MG/1
4 TABLET, ORALLY DISINTEGRATING ORAL EVERY 30 MIN PRN
Status: DISCONTINUED | OUTPATIENT
Start: 2021-02-02 | End: 2021-02-02 | Stop reason: HOSPADM

## 2021-02-02 RX ORDER — HYDROMORPHONE HCL IN WATER/PF 6 MG/30 ML
0.2 PATIENT CONTROLLED ANALGESIA SYRINGE INTRAVENOUS
Status: DISCONTINUED | OUTPATIENT
Start: 2021-02-02 | End: 2021-02-03 | Stop reason: HOSPADM

## 2021-02-02 RX ORDER — TIZANIDINE 2 MG/1
2-4 TABLET ORAL EVERY 6 HOURS PRN
Qty: 50 TABLET | Refills: 1 | Status: SHIPPED | OUTPATIENT
Start: 2021-02-02

## 2021-02-02 RX ORDER — CEFAZOLIN SODIUM IN 0.9 % NACL 3 G/100 ML
3 INTRAVENOUS SOLUTION, PIGGYBACK (ML) INTRAVENOUS
Status: COMPLETED | OUTPATIENT
Start: 2021-02-02 | End: 2021-02-02

## 2021-02-02 RX ORDER — ONDANSETRON 2 MG/ML
4 INJECTION INTRAMUSCULAR; INTRAVENOUS EVERY 6 HOURS PRN
Status: DISCONTINUED | OUTPATIENT
Start: 2021-02-02 | End: 2021-02-03 | Stop reason: HOSPADM

## 2021-02-02 RX ORDER — SODIUM CHLORIDE, SODIUM LACTATE, POTASSIUM CHLORIDE, CALCIUM CHLORIDE 600; 310; 30; 20 MG/100ML; MG/100ML; MG/100ML; MG/100ML
INJECTION, SOLUTION INTRAVENOUS CONTINUOUS
Status: DISCONTINUED | OUTPATIENT
Start: 2021-02-02 | End: 2021-02-02 | Stop reason: HOSPADM

## 2021-02-02 RX ORDER — HYDRALAZINE HYDROCHLORIDE 20 MG/ML
2.5-5 INJECTION INTRAMUSCULAR; INTRAVENOUS EVERY 10 MIN PRN
Status: DISCONTINUED | OUTPATIENT
Start: 2021-02-02 | End: 2021-02-02 | Stop reason: HOSPADM

## 2021-02-02 RX ORDER — ONDANSETRON 2 MG/ML
4 INJECTION INTRAMUSCULAR; INTRAVENOUS EVERY 30 MIN PRN
Status: DISCONTINUED | OUTPATIENT
Start: 2021-02-02 | End: 2021-02-02 | Stop reason: HOSPADM

## 2021-02-02 RX ORDER — POLYETHYLENE GLYCOL 3350 17 G/17G
1 POWDER, FOR SOLUTION ORAL DAILY
Qty: 7 PACKET | Refills: 0 | Status: SHIPPED | OUTPATIENT
Start: 2021-02-02

## 2021-02-02 RX ORDER — NICOTINE POLACRILEX 4 MG
15-30 LOZENGE BUCCAL
Status: DISCONTINUED | OUTPATIENT
Start: 2021-02-02 | End: 2021-02-03 | Stop reason: HOSPADM

## 2021-02-02 RX ORDER — DEXTROSE MONOHYDRATE 25 G/50ML
25-50 INJECTION, SOLUTION INTRAVENOUS
Status: DISCONTINUED | OUTPATIENT
Start: 2021-02-02 | End: 2021-02-03 | Stop reason: HOSPADM

## 2021-02-02 RX ORDER — HYDROXYZINE HYDROCHLORIDE 25 MG/1
25 TABLET, FILM COATED ORAL EVERY 6 HOURS PRN
Status: DISCONTINUED | OUTPATIENT
Start: 2021-02-02 | End: 2021-02-03 | Stop reason: HOSPADM

## 2021-02-02 RX ORDER — AMOXICILLIN 250 MG
1-2 CAPSULE ORAL 2 TIMES DAILY
Qty: 30 TABLET | Refills: 1 | Status: SHIPPED | OUTPATIENT
Start: 2021-02-02

## 2021-02-02 RX ORDER — FENTANYL CITRATE 50 UG/ML
25-50 INJECTION, SOLUTION INTRAMUSCULAR; INTRAVENOUS
Status: DISCONTINUED | OUTPATIENT
Start: 2021-02-02 | End: 2021-02-02 | Stop reason: HOSPADM

## 2021-02-02 RX ORDER — BUPIVACAINE HYDROCHLORIDE 7.5 MG/ML
INJECTION, SOLUTION INTRASPINAL PRN
Status: DISCONTINUED | OUTPATIENT
Start: 2021-02-02 | End: 2021-02-03

## 2021-02-02 RX ORDER — OXYCODONE HYDROCHLORIDE 5 MG/1
5-10 TABLET ORAL
Qty: 50 TABLET | Refills: 0 | Status: SHIPPED | OUTPATIENT
Start: 2021-02-02 | End: 2023-08-04

## 2021-02-02 RX ORDER — TIZANIDINE 2 MG/1
2-4 TABLET ORAL EVERY 6 HOURS PRN
Status: DISCONTINUED | OUTPATIENT
Start: 2021-02-02 | End: 2021-02-03 | Stop reason: HOSPADM

## 2021-02-02 RX ORDER — DIMENHYDRINATE 50 MG/ML
25 INJECTION, SOLUTION INTRAMUSCULAR; INTRAVENOUS
Status: DISCONTINUED | OUTPATIENT
Start: 2021-02-02 | End: 2021-02-02 | Stop reason: HOSPADM

## 2021-02-02 RX ORDER — ONDANSETRON 4 MG/1
4 TABLET, ORALLY DISINTEGRATING ORAL EVERY 6 HOURS PRN
Status: DISCONTINUED | OUTPATIENT
Start: 2021-02-02 | End: 2021-02-03 | Stop reason: HOSPADM

## 2021-02-02 RX ORDER — AMOXICILLIN 250 MG
1 CAPSULE ORAL 2 TIMES DAILY
Status: DISCONTINUED | OUTPATIENT
Start: 2021-02-02 | End: 2021-02-03 | Stop reason: HOSPADM

## 2021-02-02 RX ORDER — MEPERIDINE HYDROCHLORIDE 50 MG/ML
12.5 INJECTION INTRAMUSCULAR; INTRAVENOUS; SUBCUTANEOUS
Status: DISCONTINUED | OUTPATIENT
Start: 2021-02-02 | End: 2021-02-02 | Stop reason: HOSPADM

## 2021-02-02 RX ORDER — POLYETHYLENE GLYCOL 3350 17 G/17G
17 POWDER, FOR SOLUTION ORAL DAILY
Status: DISCONTINUED | OUTPATIENT
Start: 2021-02-03 | End: 2021-02-03 | Stop reason: HOSPADM

## 2021-02-02 RX ORDER — TRANEXAMIC ACID 650 MG/1
1950 TABLET ORAL ONCE
Status: COMPLETED | OUTPATIENT
Start: 2021-02-02 | End: 2021-02-02

## 2021-02-02 RX ORDER — METOPROLOL TARTRATE 1 MG/ML
1-2 INJECTION, SOLUTION INTRAVENOUS EVERY 5 MIN PRN
Status: DISCONTINUED | OUTPATIENT
Start: 2021-02-02 | End: 2021-02-02 | Stop reason: HOSPADM

## 2021-02-02 RX ORDER — FENTANYL CITRATE 50 UG/ML
25-50 INJECTION, SOLUTION INTRAMUSCULAR; INTRAVENOUS
Status: CANCELLED | OUTPATIENT
Start: 2021-02-02

## 2021-02-02 RX ADMIN — BUPIVACAINE HYDROCHLORIDE IN DEXTROSE 2 ML: 7.5 INJECTION, SOLUTION SUBARACHNOID at 10:28

## 2021-02-02 RX ADMIN — SODIUM CHLORIDE, POTASSIUM CHLORIDE, SODIUM LACTATE AND CALCIUM CHLORIDE: 600; 310; 30; 20 INJECTION, SOLUTION INTRAVENOUS at 10:50

## 2021-02-02 RX ADMIN — MIDAZOLAM 2 MG: 1 INJECTION INTRAMUSCULAR; INTRAVENOUS at 10:22

## 2021-02-02 RX ADMIN — OXYCODONE HYDROCHLORIDE 5 MG: 5 TABLET ORAL at 18:38

## 2021-02-02 RX ADMIN — LIDOCAINE HYDROCHLORIDE 1 ML: 10 INJECTION, SOLUTION EPIDURAL; INFILTRATION; INTRACAUDAL; PERINEURAL at 08:44

## 2021-02-02 RX ADMIN — SODIUM CHLORIDE, POTASSIUM CHLORIDE, SODIUM LACTATE AND CALCIUM CHLORIDE: 600; 310; 30; 20 INJECTION, SOLUTION INTRAVENOUS at 10:17

## 2021-02-02 RX ADMIN — METFORMIN HYDROCHLORIDE 1000 MG: 500 TABLET ORAL at 18:26

## 2021-02-02 RX ADMIN — ACETAMINOPHEN 975 MG: 325 TABLET, FILM COATED ORAL at 15:17

## 2021-02-02 RX ADMIN — FAMOTIDINE 20 MG: 20 TABLET, FILM COATED ORAL at 20:47

## 2021-02-02 RX ADMIN — CEFAZOLIN 3 G: 1 INJECTION, POWDER, FOR SOLUTION INTRAMUSCULAR; INTRAVENOUS at 10:41

## 2021-02-02 RX ADMIN — SENNOSIDES AND DOCUSATE SODIUM 1 TABLET: 8.6; 5 TABLET ORAL at 20:47

## 2021-02-02 RX ADMIN — FENTANYL CITRATE 50 MCG: 50 INJECTION, SOLUTION INTRAMUSCULAR; INTRAVENOUS at 10:29

## 2021-02-02 RX ADMIN — SODIUM CHLORIDE, POTASSIUM CHLORIDE, SODIUM LACTATE AND CALCIUM CHLORIDE: 600; 310; 30; 20 INJECTION, SOLUTION INTRAVENOUS at 09:13

## 2021-02-02 RX ADMIN — PROPOFOL 100 MCG/KG/MIN: 10 INJECTION, EMULSION INTRAVENOUS at 10:30

## 2021-02-02 RX ADMIN — SODIUM CHLORIDE: 9 INJECTION, SOLUTION INTRAVENOUS at 15:24

## 2021-02-02 RX ADMIN — CEFAZOLIN SODIUM 2 G: 2 INJECTION, SOLUTION INTRAVENOUS at 18:29

## 2021-02-02 RX ADMIN — Medication 30 MCG/MIN: at 10:41

## 2021-02-02 RX ADMIN — FENTANYL CITRATE 50 MCG: 50 INJECTION, SOLUTION INTRAMUSCULAR; INTRAVENOUS at 10:24

## 2021-02-02 RX ADMIN — TRANEXAMIC ACID 1950 MG: 650 TABLET ORAL at 08:35

## 2021-02-02 RX ADMIN — OXYCODONE HYDROCHLORIDE 10 MG: 5 TABLET ORAL at 23:47

## 2021-02-02 RX ADMIN — ACETAMINOPHEN 975 MG: 325 TABLET, FILM COATED ORAL at 23:05

## 2021-02-02 ASSESSMENT — MIFFLIN-ST. JEOR: SCORE: 2082.88

## 2021-02-02 ASSESSMENT — LIFESTYLE VARIABLES: TOBACCO_USE: 1

## 2021-02-02 NOTE — BRIEF OP NOTE
[facility] Brief Operative Note    Pre-operative diagnosis: Primary osteoarthritis of right hip    Post-operative diagnosis: Same   Procedure: Procedure(s):  Right total hip replacement   Surgeon:  Anesthesia: Dejon Luis DO  Spinal   Assistant(s): Terrell Sparrow APRN, CNP, DNP (A advanced practice provider was necessary for his expertise, exposure and surgical assistance throughout the case.)   Estimated blood loss:  Tourniquet time/pressure:  Urine output:  Fluids: Less than 100 ml  0 minutes at 0 mmHg  na  1500 ml Crystalloids   Specimens: None   Findings: right hip primary osteoarthritis [id number]     Jeremy Luis D.O.      Implants:   Implant Name Type Inv. Item Serial No.  Lot No. LRB No. Used Action   [place] Gription Acetabular Shell 58mm OD    Depuy [id number] Right 1 Implanted   [place] Altrax Acetabular Liner neutral 36mm ID 58mm OD    Depuy [id number] Right 1 Implanted   Corail Hip System Cementless Femoral Stem 135' Size 13    Depuy [id number] Right 1 Implanted   Biolox Delta Ceramic Femoral Head +5 36MM LEAH 12/14 Taper    Depuy [id number] Right 1 Implanted

## 2021-02-02 NOTE — OP NOTE
Procedure Date: 02/02/2021      PREOPERATIVE DIAGNOSIS:  Right hip primary osteoarthritis.      POSTOPERATIVE DIAGNOSIS:  Right hip primary osteoarthritis.      PROCEDURE PERFORMED:  Right total hip arthroplasty.      SURGEON:  Dejon Lius DO      FIRST ASSISTANT:  Terrell Sparrow NP (utilized throughout the procedure, assisting with soft tissue retraction, assisting with trial and final implant placement, and he provided skin closure)      ANESTHESIA:  Spinal with IV sedation.      COMPLICATIONS:  None.      ESTIMATED BLOOD LOSS:  100 mL      FLUIDS:  1500 mL crystalloids.      COUNTS:  Correct.      DISPOSITION:  To PACU in stable condition.      IMPLANTS:  Includes DePuy size 58 mm [place] acetabular shell with Gription, a neutral AltrX polyethylene acetabular liner, 36 mm, a size 13 standard collar Corail femoral stem with a +5, 36 mm ceramic head.      GROSS FINDINGS:  He had significant limitations to motion of the hip, including abduction and rotation based on his arthritis.  He had bone-on-bone degenerative changes with rimming osteophytes around the acetabulum and the femoral head.      INDICATIONS FOR PROCEDURE:  This is a pleasant 60-year-old male well known to myself.  I performed a contralateral hip replacement approximately 5 years ago.  Longstanding right-sided hip pain.  He has pain at rest, pain that wakes him at night.  He has attempted rest, activity modification, cane, Tylenol, occasional Aleve with no improvement.  Initially, when I had seen him, there were some concerns regarding his blood control and cardiac status.  He has since improved his blood sugars and was medically optimized preoperatively.  He was seen preoperatively.  Again, the risks discussed, timeframe for recovery reviewed.  Once reviewed, he opted to proceed.      DETAILS OF PROCEDURE:  He was wheeled back to operative suite #1.  Transferred to the OR table without any issues.  When deemed appropriate, he was positioned in the  lateral decubitus position.  An axillary roll was placed.  All bony prominences were padded and he was secured to the table.  The right hip was then sterilely prepped and draped in normal manner.  Prior to incision, a timeout was performed.  Again, the appropriate patient, surgery and extremity were verified and antibiotics had been administered.  A posterior approach was utilized.  The skin was sharply incised.  Subcutaneous tissue was split in line.  Electrocautery was used for hemostasis.  The fascia was then visualized and split in line as well.  A Charnley retractor was placed.  We placed the hip in slight internal rotation.  The bursal tissue was excised.  The short external rotators, piriformis and abductors identified.  Keeping the piriformis intact, creating an L-shaped release through the short external rotators into the superior aspect of the quadratus through the capsule.  This was tagged for later repair.  With this, the hip was gently dislocated.  A cobra retractor was placed around the femoral neck.  Based on preoperative templating, an osteotomy of the neck was performed with no issues.  The head was measured on the back table.  The leg was repositioned.  The inferior capsule was split.  An anterior retractor was placed.  A posterior retractor was placed.  The labrum tissue was sequentially excised.  At this point, we had adequate visualization.  The acetabulum was sequentially reamed.  At size 58, there was a circumferential bleeding bed of bone.  There were some subchondral cystic changes.  These were debrided with a curette.  The area was copiously irrigated.  The acetabular shell was inserted.  It was based on preoperative templating, intraoperative anatomy and insertion guide.  There was an excellent press-fit.  The acetabular liner was then locked into position and confirmed.  The hip was repositioned.  A box osteotome was utilized to enter the proximal femur.  This was followed with a canal  finder.  The proximal femur was sequentially broached.  At size 13, there was rotational fit and stability.  We trialed a variety of head and neck lengths up and down, but opted for the +5, which recreated leg length and stability and was consistent with preoperative templating.  The hip was gently dislocated, the broach was stable.  The broach was removed.  The area was once again irrigated.  The final Corail stem was placed with an excellent press-fit.  Again, I opted for +5, which recreated leg length and stability.  Hip was taken through full range of motion, showing no evidence of instability.  At this time, a 3-minute dilute Betadine lavage was performed.  This was followed with pulse lavage.  The short external rotators and capsule were repaired back to greater trochanter using Ethibond, followed with 0 Vicryl fascial closure, followed with 2-0 Vicryl subcutaneous closure, followed with a running Monocryl suture and skin glue.  A sterile bandage was applied and he was subsequently transferred to PACU in stable condition.  He will be brought to the hospital for orthopedic care, DVT prophylaxis, pain management, and physical therapy.         LEFTY BLANCAS DO             D: 2021   T: 2021   MT: LEONA      Name:     EVELYN CHILDRESS   MRN:      [id number]        Account:        [id number]   :      1960           Procedure Date: 2021      Document: [id number]

## 2021-02-02 NOTE — PROGRESS NOTES
S-(situation): Patient registered to Observation. Patient arrived to room 274 via cart from PACU at about 1430    B-(background): R BRANDON    A-(assessment): patient moved to bed via air elisabeth and 3 staff, IV Fluids open to gravity, initiated on pump at 100cc/ hour, jeovany socks on left leg, SCD's applied, had general and spinal and can wiggle and move feet but cannot feel much below the knee yet at this point.  Drank apple juice. No nausea.  Tylenol given per orders.      R-(recommendations): Orders and observation goals reviewed with patient.    Nursing Observation criteria listed below was met:    Skin issues/needs documented:NA  Isolation needs addressed and Signage up: NA  Fall Prevention: Education given and documented: Yes  Education Assessment documented:Yes  Education Documented: Yes  OBS video/handout Reviewed & Documented: Yes  Allergies Reviewed: Yes  Medication Reconciliation Complete: Yes  New medication patient education completed and documented (Possible Side Effects of Common Medications handout): Yes  Home medications if not able to send immediately home with family stored here: NA  Reminder note placed in discharge instructions: NA  Patient has discharge needs (If yes, please explain): No, plans to return to his mother's house which is handicapped accessible but his sister will be care taking.

## 2021-02-02 NOTE — PROGRESS NOTES
02/02/21 1525   Quick Adds   Type of Visit Initial PT Evaluation       Present no   Living Environment   People in home alone   Current Living Arrangements house   Home Accessibility stairs to enter home;stairs within home   Number of Stairs, Main Entrance 2   Number of Stairs, Within Home, Primary 7  (split level entry)   Stair Railings, Within Home, Primary railings safe and in good condition   Transportation Anticipated family or friend will provide   Living Environment Comments plans to stay with his sister who is also caring for his mother, the home is handicap accessible, no stairs to enter. low threshold bath patient unsure of set up. sister able to assist with needs. brother will drive patient home in  truck   Self-Care   Usual Activity Tolerance moderate   Current Activity Tolerance moderate   Regular Exercise No   Equipment Currently Used at Home cane, straight;grab bar, toilet;grab bar, tub/shower;raised toilet seat;walker, rolling   Disability/Function   Hearing Difficulty or Deaf no   Wear Glasses or Blind yes   Vision Management glases   Concentrating, Remembering or Making Decisions Difficulty no   Difficulty Communicating no   Difficulty Eating/Swallowing no   Walking or Climbing Stairs Difficulty yes   Walking or Climbing Stairs ambulation difficulty, requires equipment   Mobility Management cane    Dressing/Bathing Difficulty no   Toileting issues no   Doing Errands Independently Difficulty (such as shopping) yes   Fall history within last six months no   General Information   Onset of Illness/Injury or Date of Surgery 02/02/21   Referring Physician Dr. Luis   Patient/Family Therapy Goals Statement (PT) home to mother's home with HHPT   Pertinent History of Current Problem (include personal factors and/or comorbidities that impact the POC) Patient is a 60 year old male, day 0 status post right total hip arthroplasty by Dr. Luis with spinal nerve block and 100mL  EBL. Patient with a previous medical history of CAD with bypass and defirbillator placement, HTN, obesity, hyperlipidemia, DM 2, L BRANDON in 2016, CHF and atypical atrial flutter.   Existing Precautions/Restrictions no hip ER;no hip ADD past midline;90 degree hip flexion;no pivoting or twisting;weight bearing;fall   Weight-Bearing Status - LUE full weight-bearing   Weight-Bearing Status - RUE full weight-bearing   Weight-Bearing Status - LLE full weight-bearing   Weight-Bearing Status - RLE weight-bearing as tolerated   General Observations PT orders: eval and treat post operative hip, Activity orders: ambulate,    Cognition   Orientation Status (Cognition) oriented x 3   Affect/Mental Status (Cognition) WFL   Follows Commands (Cognition) WFL   Pain Assessment   Patient Currently in Pain No   Integumentary/Edema   Integumentary/Edema Comments post operative dressing CDI   Range of Motion (ROM)   ROM Comment right hip painful achieving resting hip extension however able to achieve momentarily. Hip flexion to 80 degrees with pain. Left hip limited end ranges   Strength   Strength Comments good quad set, unable to SLR flexion RLE. 4-/5 ankle Df and PF   Bed Mobility   Comment (Bed Mobility) Patient refused mobilization secondary to impaired sensation of the LEs   Sensory Examination   Sensory Perception Comments impaired sensation RLE L3 and below   Muscle Tone   Muscle Tone Comments increased right hip flexor tone   Clinical Impression   Criteria for Skilled Therapeutic Intervention yes, treatment indicated   PT Diagnosis (PT) muscle weakness, joint stiffness, post operative healing, impaired gait, impaired balance   Influenced by the following impairments day 0 status post right BRANDON   Functional limitations due to impairments pain, increased assistance for baseline mobility, useof walker. assistance for ADLs   Clinical Presentation Stable/Uncomplicated   Clinical Presentation Rationale nerve block intact, post operative  status, clinical judgement, previous BRANDON   Clinical Decision Making (Complexity) low complexity   Therapy Frequency (PT) 2x/day   Predicted Duration of Therapy Intervention (days/wks) 2 days   Planned Therapy Interventions (PT) balance training;cryotherapy;gait training;home exercise program;ROM (range of motion);patient/family education;strengthening;transfer training   Anticipated Equipment Needs at Discharge (PT)   (none)   Risk & Benefits of therapy have been explained evaluation/treatment results reviewed;care plan/treatment goals reviewed;risks/benefits reviewed;participants included;patient   Clinical Impression Comments Patient presents with signs and symptoms consistent with post operative BRANDON. Patient with good discharge plan and previous knowledge of BRANDON recovery. Anticipate patient to do well with discharge home to mothers with HHPT. He would benefit from continued therapeutic intervention to address post operative impairements in order to progress him towards greater function and IND.    PT Discharge Planning    PT Discharge Recommendation (DC Rec) home with assist;home with home care physical therapy   PT Rationale for DC Rec Unable to mobilize during initial evaluation due to impaired distal sensation and patient's concerns of mobilization at this time. Nursing encouraged to mobilize patient. Anticipate patient to do well, given home environment and assistance at home. He would benefit from continued therapeutic intervention to address post operative impairements in order to progress him towards greater function and IND.    PT Brief overview of current status  Completed post operative HEP. declined bed mobility and will ambulate with nursing this evening   Total Evaluation Time   Total Evaluation Time (Minutes) 15     Thank you for your referral.  An Horowitz, PT, DPT, ATC    [facility]  O: [phone]  E: [email or web address]

## 2021-02-02 NOTE — PROGRESS NOTES
Saw and examined patient.  POD0 s/p total hip replacement, right  Per patient doing very well. Shoulders sore (chronic shoulder issues, reported OA to both shoulder), but denies any hip pain currently.  No current nausea will be advancing diet as tolerated.  Has not voided yet, DTV. Has not stood at bedside. Numbness not yet wearing off from spinal.  Patient plans to discharge to mother's house that is handicap accessible living. Normally lives by himself in a farmhouse.    Reinforced posterior precautions, answered all questions.     /82   Pulse 83   Temp 96.4  F (35.8  C)   Resp 12   SpO2 94%     Alert and orient x 4, laying in PACU bed.   Dressing CDI.     motor intact to foot.  Toes well perfused. D.p. Pulse 2+  No sensation yet.      Plan:   Diabetic. Only takes oral agent. Will resume metformin tonight. Hold if not tolerating oral intake.   will see again tomorrow Am.   Plan for discharge tomorrow afternoon pending progress.    SHALONDA Mcfarland, CNP  Orthopedic Surgery

## 2021-02-02 NOTE — INTERVAL H&P NOTE
This H&P has been reviewed and there are no clinically significant changes in the patient s condition.  The patient was evaluated by myself as well as Dr. Sierra prior to surgery. The Patient is approved for the surgery and the stated surgical procedure is still clinically indicated.

## 2021-02-02 NOTE — ANESTHESIA PREPROCEDURE EVALUATION
Anesthesia Pre-Procedure Evaluation    Patient: Fredy Clark   MRN: [id number] : 1960        Preoperative Diagnosis: Primary osteoarthritis of right hip [M16.11]   Procedure : Procedure(s):  Right total hip replacement     Past Medical History:   Diagnosis Date     Coronary atherosclerosis of unspecified type of vessel, native or graft     Coronary artery disease     Obesity, Class II, BMI 35-39.9, with comorbidity 10/26/2015     Obesity, unspecified      Other and unspecified hyperlipidemia      Type II or unspecified type diabetes mellitus without mention of complication, not stated as uncontrolled      Unspecified essential hypertension       Past Surgical History:   Procedure Laterality Date     ARTHROPLASTY HIP Left 2016    Procedure: ARTHROPLASTY HIP;  Surgeon: Dejon Luis DO;  Location: [facility] OR     C ANESTH,CARDIOVERTER/DEFIB  2001    Implant defibrillator, 2006 - pulse generator change serial # [id number]     C CABG, ARTERY-VEIN, FIVE       HC PPM GENERATOR REMOVAL  06    [facility]- removed Medtronic [id number] replaced with Medtronic Entrust#[id number]      Allergies   Allergen Reactions     No Known Drug Allergies       Social History     Tobacco Use     Smoking status: Former Smoker     Packs/day: 2.00     Years: 20.00     Pack years: 40.00     Types: Cigarettes     Quit date: 1999     Years since quittin.6     Smokeless tobacco: Former User   Substance Use Topics     Alcohol use: Yes     Alcohol/week: 0.0 standard drinks     Comment: very little      Wt Readings from Last 1 Encounters:   21 122.9 kg (271 lb)        Anesthesia Evaluation   Pt has had prior anesthetic. Type: General and MAC.        ROS/MED HX  ENT/Pulmonary:     (+) tobacco use, Past use,     Neurologic:  - neg neurologic ROS     Cardiovascular:     (+) Dyslipidemia hypertension--CAD -past MI CABG--CHF Last EF: 7.5 date:  ICD Irregular Heartbeat/Palpitations, Previous cardiac  testing   Echo: Date: 11/20 Results:  Interpretation Summary     Severe hypokinesis of the inferior, inferoseptal and inferolateral walls.  Left ventricular systolic function is moderately reduced.  The visual ejection fraction is estimated at 40%.  The left atrium is mildly dilated.  No hemodynamically significant valvular abnormalities on 2D or color flow  imaging. The study was technically difficult. There is no comparison study  available.  _____________________________________________________________________________  __        Left Ventricle  The left ventricle is normal in size. There is normal left ventricular wall  thickness. Grade I or early diastolic dysfunction. Diastolic Doppler findings  (E/E' ratio and/or other parameters) suggest left ventricular filling  pressures are indeterminate. Left ventricular systolic function is moderately  reduced. The visual ejection fraction is estimated at 40%. Severe hypokinesis  of the inferior, inferoseptal and inferolateral walls. Septal motion is  consistent with conduction abnormality.     Right Ventricle  The right ventricle is normal in structure, function and size. There is a  pacemaker lead in the right ventricle.     Atria  The left atrium is mildly dilated. Right atrial size is normal. There is no  atrial shunt seen.     Mitral Valve  The mitral valve is normal in structure and function. There is trace mitral  regurgitation.        Tricuspid Valve  The tricuspid valve is normal in structure and function. There is trace  tricuspid regurgitation. IVC diameter <2.1 cm collapsing >50% with sniff  suggests a normal RA pressure of 3 mmHg. Right ventricular systolic pressure  could not be approximated due to inadequate tricuspid regurgitation.     Aortic Valve  The aortic valve is normal in structure and function. No aortic regurgitation  is present. No aortic stenosis is present.     Pulmonic Valve  The pulmonic valve is not well seen, but is grossly normal. There  is trace  pulmonic valvular regurgitation.     Vessels  Normal size aorta.     Pericardium  There is no pericardial effusion.        Rhythm  Sinus rhythm was noted.  _____________________________________________________________________________  __  MMode/2D Measurements & Calculations  IVSd: 1.1 cm     LVIDd: 5.4 cm  LVIDs: 5.0 cm  LVPWd: 0.99 cm  FS: 6.5 %  LV mass(C)d: 219.0 grams  LV mass(C)dI: 92.3 grams/m2  Ao root diam: 3.1 cm  LA dimension: 4.7 cm  asc Aorta Diam: 3.2 cm  LA/Ao: 1.5  LA Volume (BP): 101.0 ml  LA Volume Index (BP): 42.6 ml/m2  RWT: 0.37           Doppler Measurements & Calculations  MV E max bryce: 75.7 cm/sec  MV A max bryce: 108.7 cm/sec  MV E/A: 0.70  MV dec slope: 446.5 cm/sec2  MV dec time: 0.17 sec  PA acc time: 0.10 sec  E/E' av.0  Lateral E/e': 12.0  Medial E/e': 13.9           _____________________________________________________________________________  __           Stress Test: Date: Results:    ECG Reviewed: Date: Results:  NSR with evidence of previous MI  Cath: Date: Results:      METS/Exercise Tolerance:     Hematologic:  - neg hematologic  ROS     Musculoskeletal:   (+) arthritis,     GI/Hepatic:  - neg GI/hepatic ROS     Renal/Genitourinary:  - neg Renal ROS     Endo:     (+) type II DM, Obesity,     Psychiatric/Substance Use:  - neg psychiatric ROS     Infectious Disease:  - neg infectious disease ROS     Malignancy:  - neg malignancy ROS     Other:  - neg other ROS          Physical Exam    Airway        Mallampati: II   TM distance: > 3 FB   Neck ROM: full   Mouth opening: > 3 cm    Respiratory Devices and Support         Dental  no notable dental history         Cardiovascular             Pulmonary   pulmonary exam normal                OUTSIDE LABS:  CBC:   Lab Results   Component Value Date    WBC 9.3 2021    WBC 6.5 10/16/2020    HGB 15.5 2021    HGB 15.3 10/16/2020    HCT 43.6 2021    HCT 42.7 10/16/2020     2021     10/16/2020      BMP:   Lab Results   Component Value Date     01/22/2021     08/03/2020    POTASSIUM 4.1 01/22/2021    POTASSIUM 3.7 08/03/2020    CHLORIDE 103 01/22/2021    CHLORIDE 105 08/03/2020    CO2 28 01/22/2021    CO2 29 08/03/2020    BUN 21 01/22/2021    BUN 18 08/03/2020    CR 0.62 (L) 01/22/2021    CR 0.69 08/03/2020     (H) 01/22/2021     (H) 08/03/2020     COAGS: No results found for: PTT, INR, FIBR  POC:   Lab Results   Component Value Date     (H) 01/08/2016     HEPATIC:   Lab Results   Component Value Date    ALBUMIN 3.7 08/03/2020    PROTTOTAL 7.4 08/03/2020    ALT 18 08/03/2020    AST 10 08/03/2020    ALKPHOS 80 08/03/2020    BILITOTAL 0.5 08/03/2020     OTHER:   Lab Results   Component Value Date    A1C 7.5 (H) 01/22/2021    STEPHANIE 9.4 01/22/2021    TSH 1.49 12/23/2019       Anesthesia Plan     History & Physical Review      ASA Status:  3. NPO Status:  NPO Appropriate. .  Plan for Spinal with Other induction. Maintenance will be Balanced.         PONV prophylaxis:  Ondansetron (or other 5HT-3).       Consents  Anesthesia Plan(s) and associated risks, benefits, and realistic alternatives discussed.    Questions answered and patient/representative(s) expressed understanding.    Discussed with:  Patient.             Postoperative Care  Postoperative pain management: Peripheral nerve block (Single Shot) and IV analgesics.           SHALONDA Cleveland CRNA

## 2021-02-02 NOTE — OR NURSING
Transfer from  PACU to Room 274  Transferred to bed via Glyder Mat (Glyder Mat,Transfer Boar,Slider Sheet)    S: 59 y/o male  S/P Right total hip replacement       Anesthesia Type:  General with Spinal       Surgeon:  Dr. Luis       Allergies:  See Medication Reconciliation Record       DNR: No  (Yes,No)    B:  Pertinent Medical History:   Past Medical History:   Diagnosis Date     Coronary atherosclerosis of unspecified type of vessel, native or graft     Coronary artery disease     Obesity, Class II, BMI 35-39.9, with comorbidity 10/26/2015     Obesity, unspecified      Other and unspecified hyperlipidemia      Type II or unspecified type diabetes mellitus without mention of complication, not stated as uncontrolled      Unspecified essential hypertension         (CHF; Heart Disease; Lung Disease; Chronic Pain; Diabetes; Other (Comment)          Surgical History:    Past Surgical History:   Procedure Laterality Date     ARTHROPLASTY HIP Left 1/5/2016    Procedure: ARTHROPLASTY HIP;  Surgeon: Dejon Luis DO;  Location: [facility] OR     C ANESTH,CARDIOVERTER/DEFIB  01/2001    Implant defibrillator, 06/2006 - pulse generator change serial # [id number]     C CABG, ARTERY-VEIN, FIVE       HC PPM GENERATOR REMOVAL  06/16/06    [facility]- removed Medtronic [id number] replaced with Medtronic Entrust#[id number]         A:  EBL: 100        IVF:  1700        UOP:  Bladder scanned for 175        NPO:  ___Yes ___No         Vomiting:  ___Yes __X_No         Drainage: None        Skin Integrity: Normal (Normal; Pressure Ulcer (Location)                    See PACU record for ongoing assessment, vital signs and pain assessment.    R: Post-Op vitals and assessments as ordered/indicated per patient's condition.       Follow Post-Op orders and notify Physician prn.       Continue to involve patient/family in plan of care and discharge planning.       Reinforce Pre-Operative education.       Implement skin safety  interventions as appropriate.    Name: Jacque Ferris RN BSN

## 2021-02-02 NOTE — ANESTHESIA PROCEDURE NOTES
Pre-Procedure   Staff -   Performed By: CRNA  Location: OR  Pre-Anesthestic Checklist: patient identified, IV checked, risks and benefits discussed, informed consent, monitors and equipment checked and pre-op evaluation  Timeout:  Correct Patient: Yes   Correct Procedure: Yes   Correct Site: Yes   Correct Position: Yes   Correct Laterality: N/A   Site Marked: N/A    Procedure Documentation  Procedure: intrathecal  Patient Position:sitting  Patient Prep/Sterile Barriers: sterile gloves, mask, Betadine, patient draped  Insertion Site: L3-4. (midline approach).  Spinal Needle (gauge): 25   Spinal/LP Needle Length (inches): 3.5  Spinal Needle Type: Penelope tipIntroducer used  Introducer: 20 G  # of attempts: 1 and # of redirects:     Assessment/Narrative      Paresthesias: No.  CSF fluid: clear.  Opening pressure was cmH2O while sitting.

## 2021-02-03 ENCOUNTER — APPOINTMENT (OUTPATIENT)
Dept: PHYSICAL THERAPY | Facility: CLINIC | Age: 61
End: 2021-02-03
Attending: ORTHOPAEDIC SURGERY
Payer: COMMERCIAL

## 2021-02-03 ENCOUNTER — TELEPHONE (OUTPATIENT)
Dept: INTERNAL MEDICINE | Facility: CLINIC | Age: 61
End: 2021-02-03

## 2021-02-03 VITALS
HEIGHT: 73 IN | DIASTOLIC BLOOD PRESSURE: 86 MMHG | TEMPERATURE: 97.7 F | OXYGEN SATURATION: 94 % | BODY MASS INDEX: 35.76 KG/M2 | HEART RATE: 95 BPM | RESPIRATION RATE: 16 BRPM | WEIGHT: 269.84 LBS | SYSTOLIC BLOOD PRESSURE: 162 MMHG

## 2021-02-03 LAB
GLUCOSE SERPL-MCNC: 197 MG/DL (ref 70–99)
HGB BLD-MCNC: 12.7 G/DL (ref 13.3–17.7)

## 2021-02-03 PROCEDURE — 250N000011 HC RX IP 250 OP 636: Performed by: NURSE PRACTITIONER

## 2021-02-03 PROCEDURE — 36415 COLL VENOUS BLD VENIPUNCTURE: CPT | Performed by: NURSE PRACTITIONER

## 2021-02-03 PROCEDURE — 97110 THERAPEUTIC EXERCISES: CPT | Mod: GP | Performed by: PHYSICAL THERAPIST

## 2021-02-03 PROCEDURE — 97530 THERAPEUTIC ACTIVITIES: CPT | Mod: GP | Performed by: PHYSICAL THERAPIST

## 2021-02-03 PROCEDURE — 85018 HEMOGLOBIN: CPT | Performed by: NURSE PRACTITIONER

## 2021-02-03 PROCEDURE — 97116 GAIT TRAINING THERAPY: CPT | Mod: GP | Performed by: PHYSICAL THERAPIST

## 2021-02-03 PROCEDURE — 82947 ASSAY GLUCOSE BLOOD QUANT: CPT | Performed by: NURSE PRACTITIONER

## 2021-02-03 PROCEDURE — 250N000013 HC RX MED GY IP 250 OP 250 PS 637: Performed by: NURSE PRACTITIONER

## 2021-02-03 RX ADMIN — FAMOTIDINE 20 MG: 20 TABLET, FILM COATED ORAL at 09:04

## 2021-02-03 RX ADMIN — CEFAZOLIN SODIUM 2 G: 2 INJECTION, SOLUTION INTRAVENOUS at 02:38

## 2021-02-03 RX ADMIN — OXYCODONE HYDROCHLORIDE 10 MG: 5 TABLET ORAL at 05:57

## 2021-02-03 RX ADMIN — RIVAROXABAN 10 MG: 10 TABLET, FILM COATED ORAL at 09:05

## 2021-02-03 RX ADMIN — METFORMIN HYDROCHLORIDE 1000 MG: 500 TABLET ORAL at 09:05

## 2021-02-03 RX ADMIN — OXYCODONE HYDROCHLORIDE 10 MG: 5 TABLET ORAL at 10:52

## 2021-02-03 RX ADMIN — ACETAMINOPHEN 975 MG: 325 TABLET, FILM COATED ORAL at 06:00

## 2021-02-03 RX ADMIN — POLYETHYLENE GLYCOL 3350 17 G: 17 POWDER, FOR SOLUTION ORAL at 09:06

## 2021-02-03 RX ADMIN — SENNOSIDES AND DOCUSATE SODIUM 1 TABLET: 8.6; 5 TABLET ORAL at 09:07

## 2021-02-03 ASSESSMENT — ACTIVITIES OF DAILY LIVING (ADL): DEPENDENT_IADLS:: INDEPENDENT

## 2021-02-03 NOTE — PLAN OF CARE
Patient vital signs are at baseline: Yes  Patient able to ambulate as they were prior to admission or with assist devices provided by therapies during their stay:  Yes  Patient MUST void prior to discharge:  Yes  Patient able to tolerate oral intake:  Yes  Pain has adequate pain control using Oral analgesics:  Yes    Pt is A & O x 4, VSS, afebrile, on room air. Pt has feeling in both legs, CMS, good pedal pulses. Pt denying pain, only scheduled tylenol given. Pt was up and ambulated 75 ft in hallway, tolerated well. Pt was bladder scanned for 500 mL. 300 mL output via urinal x 2. Pt up with assist of 1 and gait belt. Will continue to monitor POC.

## 2021-02-03 NOTE — ANESTHESIA CARE TRANSFER NOTE
Patient: Fredy Clark    Procedure(s):  Right total hip replacement    Diagnosis: Primary osteoarthritis of right hip [M16.11]  Diagnosis Additional Information: No value filed.    Anesthesia Type:   Spinal     Note:    Oropharynx: oropharynx clear of all foreign objects and spontaneously breathing  Level of Consciousness: drowsy  Oxygen Supplementation: face mask    Independent Airway: airway patency satisfactory and stable  Dentition: dentition unchanged  Vital Signs Stable: post-procedure vital signs reviewed and stable  Report to RN Given: handoff report given  Patient transferred to: PACU    Handoff Report: Identifed the Patient, Identified the Reponsible Provider, Reviewed the pertinent medical history, Discussed the surgical course, Reviewed Intra-OP anesthesia mangement and issues during anesthesia, Set expectations for post-procedure period and Allowed opportunity for questions and acknowledgement of understanding      Vitals: (Last set prior to Anesthesia Care Transfer)  CRNA VITALS  2/2/2021 1213 - 2/2/2021 1313      2/2/2021             Pulse:  66    SpO2:  99 %        Electronically Signed By: SHALONDA Cleveland CRNA  February 3, 2021  7:57 AM

## 2021-02-03 NOTE — PROGRESS NOTES
Patient vital signs are at baseline: Yes  Patient able to ambulate as they were prior to admission or with assist devices provided by therapies during their stay:  Yes  Patient MUST void prior to discharge:  Yes  Patient able to tolerate oral intake:  Yes  Pain has adequate pain control using Oral analgesics:  Yes     Pt had oxycodone earlier in night for R hip pain rated 1/10, pt denies pain at this time. CMS intact to RLE. Pt ambulated in lorenz last evening. Dressing to R hip is clean dry and intact. Will continue with plan of care, monitor pain, CMS, PT today.

## 2021-02-03 NOTE — ANESTHESIA POSTPROCEDURE EVALUATION
Patient: Fredy Clark    Procedure(s):  Right total hip replacement    Diagnosis:Primary osteoarthritis of right hip [M16.11]  Diagnosis Additional Information: No value filed.    Anesthesia Type:  Spinal    Note:  Disposition: Inpatient   Postop Pain Control: Uneventful            Sign Out: Well controlled pain   PONV: No   Neuro/Psych: Uneventful            Sign Out: Acceptable/Baseline neuro status   Airway/Respiratory: Uneventful            Sign Out: Acceptable/Baseline resp. status   CV/Hemodynamics: Uneventful            Sign Out: Acceptable CV status   Other NRE: NONE   DID A NON-ROUTINE EVENT OCCUR? No    Event details/Postop Comments:  Patient was very pleased with his anesthetic.  He has been up walking with PT without complaint. His sensory/motor function has returned to baseline.  His pain has been well controlled.  He plans on being discharged to home today.          Last vitals:  Vitals:    02/03/21 0402 02/03/21 0557 02/03/21 0655   BP: 124/68     Pulse: 80     Resp: 20 20 20   Temp: 98  F (36.7  C)     SpO2: 95%         Electronically Signed By: SHALONDA Yanez CRNA  February 3, 2021  8:03 AM

## 2021-02-03 NOTE — DISCHARGE SUMMARY
[facility] Discharge Summary    Fredy Clark MRN# [id number]   Age: 60 year old YOB: 1960     Date of Admission:  2/2/2021  Date of Discharge::  2/3/2021  Admitting Physician:  Dejon Luis DO  Discharge Physician:  Ruy Mane PA-C     Home clinic: [facility]          Admission Diagnoses:   Primary osteoarthritis of right hip [M16.11]  S/P total hip arthroplasty [Z96.649]          Discharge Diagnosis:     Primary osteoarthritis of right hip [M16.11]  S/P total hip arthroplasty [Z96.649]          Procedures:     Procedure(s): Procedure(s):  Right total hip replacement       No other procedures performed during this admission           Medications Prior to Admission:     Medications Prior to Admission   Medication Sig Dispense Refill Last Dose     CAYENNE 500 MG OR CAPS 1 tab twice daily  0 2/1/2021 at 0800     metFORMIN (GLUCOPHAGE) 1000 MG tablet TAKE ONE TABLET BY MOUTH TWICE A DAY WITH MEALS 60 tablet 3 2/1/2021 at 0800     MULTIVITAMIN TABS   OR 1 tab daily  0 1/31/2021 at 2000     OMEGA-3 CAPS   OR 1200 mg.--1 tab bid  0 2/1/2021 at 0800     VITAMIN C 500 MG OR TABS 1 tab daily 60 0 2/1/2021 at 0800     [DISCONTINUED] aspirin (ASA) 325 MG EC tablet Take 325 mg by mouth every 6 hours as needed for moderate pain   1/26/2021 at Unknown time     [DISCONTINUED] lisinopril-hydrochlorothiazide (ZESTORETIC) 20-25 MG tablet TAKE TWO TABLETS BY MOUTH DAILY 180 tablet 1 1/31/2021 at 2000             Discharge Medications:     Current Discharge Medication List      START taking these medications    Details   acetaminophen (TYLENOL) 325 MG tablet Take 3 tablets (975 mg) by mouth every 8 hours as needed for other (mild pain)  Qty: 100 tablet, Refills: 1    Associated Diagnoses: Status post total replacement of right hip      hydrOXYzine (ATARAX) 25 MG tablet Take 1 tablet (25 mg) by mouth every 6 hours as needed for itching or anxiety (with pain, moderate  pain)  Qty: 30 tablet, Refills: 1    Associated Diagnoses: Status post total replacement of right hip      oxyCODONE (ROXICODONE) 5 MG tablet Take 1-2 tablets (5-10 mg) by mouth every 3 hours as needed for pain (Moderate to Severe)  Qty: 50 tablet, Refills: 0    Associated Diagnoses: Status post total replacement of right hip      polyethylene glycol (MIRALAX) 17 g packet Take 17 g by mouth daily  Qty: 7 packet, Refills: 0    Associated Diagnoses: Status post total replacement of right hip      rivaroxaban ANTICOAGULANT (XARELTO) 10 MG TABS tablet Take 1 tablet (10 mg) by mouth daily  Qty: 35 tablet, Refills: 0    Comments: For DVT Prevention  Associated Diagnoses: Status post total replacement of right hip      senna-docusate (SENOKOT-S/PERICOLACE) 8.6-50 MG tablet Take 1-2 tablets by mouth 2 times daily Take while on oral narcotics to prevent or treat constipation.  Qty: 30 tablet, Refills: 1    Comments: While taking narcotics  Associated Diagnoses: Status post total replacement of right hip      tiZANidine (ZANAFLEX) 2 MG tablet Take 1-2 tablets (2-4 mg) by mouth every 6 hours as needed for muscle spasms (pain)  Qty: 50 tablet, Refills: 1    Associated Diagnoses: Status post total replacement of right hip         CONTINUE these medications which have CHANGED    Details   aspirin (ASA) 325 MG EC tablet Take 1 tablet (325 mg) by mouth every 6 hours as needed for moderate pain  Qty:        lisinopril-hydrochlorothiazide (ZESTORETIC) 20-25 MG tablet TAKE TWO TABLETS BY MOUTH DAILY  Qty: 180 tablet, Refills: 1    Associated Diagnoses: Benign essential hypertension         CONTINUE these medications which have NOT CHANGED    Details   CAYENNE 500 MG OR CAPS 1 tab twice daily  Refills: 0      metFORMIN (GLUCOPHAGE) 1000 MG tablet TAKE ONE TABLET BY MOUTH TWICE A DAY WITH MEALS  Qty: 60 tablet, Refills: 3    Associated Diagnoses: Type 2 diabetes mellitus without complication, without long-term current use of insulin (H)       MULTIVITAMIN TABS   OR 1 tab daily  Refills: 0      OMEGA-3 CAPS   OR 1200 mg.--1 tab bid  Refills: 0      VITAMIN C 500 MG OR TABS 1 tab daily  Qty: 60, Refills: 0                   Consultations:   Physical Therapy/Case Management           Brief History of Illness:   Reason for admission requiring a surgical or invasive procedure:   Primary osteoarthritis of right hip [M16.11]  S/P total hip arthroplasty [Z96.649]   The patient underwent the following procedure(s):   Procedure(s):  Right total hip replacement   There were no immediate complications during this procedure.    Please refer to the full operative summary for details.           Hospital Course:   This patient was admitted for the above diagnosis to under go the above procedure.  Postoperatively he did very well with no apparent complications, and he had an uneventful hospital stay. Antibiotics were given for 24 hours after surgery. He was given Xarelto, leg pumps, teds for DVT prophylaxis and his pain was well controlled with IV pain meds, then transitioned to oral pain medications during his hospital stay. The patient was followed by the Orthopedic team and on postoperative day 1 CMS/dressing were intact.  He progressed well with physical therapy and discharge to home with home care physical therapy was recommended. His chronic medical problems were followed by the Ortho team during his hospital stay and there were no significant changes to conditions or treatment plans.  No new medical problems presented during his hospital stay.       Discharge Instructions and Follow-Up:     Discharge diet: Pre-procedure diet or regular   Discharge activity: Activity as tolerated  Driving restricitIons: no driving while taking narcotic analgesics  Weight bearing status: Weight bearing as tolerated right lower extremity   Discharge follow-up: Follow up with Jeremy Luis DO and/or SHALONDA Gandara, ROCIO and/or Ruy Mane PA-C and/or Arlin Stevenson PA-C in 2 weeks,  we will get Standing AP pelvis and Standing AP/Crosstable Lateral Xrays of the right hip at that time.   Wound care: Keep the Aquacel (surgical) dressing on for 7 days after surgery, on the 7th day you can remove the dressing and apply gauze and tape.  Change that dressing once per day until your clinic follow up.     Anticoagulation:            Xeralto 10mg x 35 days.     Hip Precautions:           Posterior hip precautions x 6 weeks           Discharge Disposition:     Discharged to mother's home with home care physical therapy      Attestation:  I have reviewed today's vital signs, notes, medications, labs and imaging.  Face-to-face time: 45 minutes    Ruy Mane PA-C

## 2021-02-03 NOTE — PROGRESS NOTES
Patient discharged with family member via wheelchair at 1105am.   Discharge instructions provided to patient, spent time answering questions. Patient verbalized understanding, and stated no further questions.   Homecare set up, prescriptions at pharmacy. New medications reviewed and instructions on pain meds provided.

## 2021-02-03 NOTE — DISCHARGE INSTRUCTIONS
Total Hip Replacement Discharge Instructions                                      [phone]  Bone and Joint Service Line for issues or concerns    You are being discharged with a prescription strength blood thinner (Xarelto) for the next 35 days.  Do not take any aspirin or NSAID containing products (aspirin, Excedrin, ibuprofen, Aleve, Motrin, etc) while taking this. After 35 days you can stop the Xarelto and resume any previous aspirin or NSAID type medication.          General Care:  After surgery you may feel tired/sleepy. This is normal. If you have any question along the way please contact the office. If you feel it is an issue cannot wait for normal office hours, contact the on-call physician. You should not drive or operate machines/equipment until released by your physician to do so.     Bandages:    It s normal to have some blood-tinged fluid on your bandages, this may occur for a few days after being sent home from the hospital. Keep incision covered with hospital dressing (Aquacel) for one week. It is okay to shower with this dressing on. However, do not submerge your dressing and incision in water for roughly 2 weeks. After one week remove this dressing and then keep it clean, dry, and covered. If this dressing become looses or falls off it is ok to cover with gauze and tape.  Wash the incision gently with warm soapy water after removing your hospital dressing and lightly pat dry.      Swelling (edema) control:  Preventing swelling (edema) in your legs after surgery is very important. It is helpful for achieving optimal range of motion as well as preventing blood clots.  It starts with simple things such as elevation of your legs and icing. Elevate your legs above your heart.    Do this for 20 minutes every couple hours the first few days after surgery. We also recommend BRITTANY hose (compression hose) to be worn. Wear on both legs during the day. You may remove at night. Wear these until directed to  stop.    Bathing:  Do not submerge your incision in water such as a bath or pool. It is ok to shower when you get home but keep your incision covered with a sealed bandage. You may find in comfortable to get a shower chair for the first month while you continue to heal and get stronger.     Follow up:  Your follow up appointment should already be scheduled. If it s not, please call the office to verify an appointment 14 days after surgery. If there are no issues in your recovery you will have an appointment at 14 days, 6 weeks, 3 months, 6 months, and 1 year from your surgery date.     Diet:  You may not have a full appetite at discharge this should get better. Progress to bland foods such as crackers and bread and finally to your normal diet if you have no problems.  Avoid alcohol when taking narcotic pain medications.      Pain control:  Take your pain medications as prescribed. These medications may make you sleepy. Do not drive, operate equipment, or drink alcohol when taking these.  You may take Tylenol (Generic name is acetaminophen) as directed on the bottle for additional relief or in place of the prescribed pain medications as your pain gets better.  If the medications cause a reaction such as nausea or skin rash, stop taking them and contact your doctor. Please plan accordingly, pain medications will not be re-filled on the weekends or at night. Call the office during the day if you need more medications.    Blood thinner:  It is very important to take the prescribed medication as directed to prevent blood clots. No medication is perfect, so if you notice a sudden onset of pain/swelling in your calf area call your doctor. If you notice a sudden onset of troubles breathing and/or chest pain call 911.     Icing:  It is common for some swelling, aching and stiffness to occur for awhile after a hip replacement.  Apply for 20 minutes at a time. For the first 1-2 weeks apply ice 2-3 x a day or more after  therapy.    Walker/crutches:  Use a walker/crutches when you go home. You will transition to the use of a cane and finally to no additional support.     Physical Therapy:  The success of your hip replacement is based on doing physical therapy. You will have some pain and discomfort along the way. If you feel your pain is limiting your progress make sure to take some pain medication prior to your therapy session. If your pain medications is not working, talk to your surgeon.   For the first 1-2 weeks after going home you will have in-home physical therapy. The goal is to work on walking and feeling steady.  Usually after your first post-operative follow up you will move to out-patient physical therapy.     Activity:  Unless otherwise instructed, you can weight bear as tolerated with a walker/cane. For 6 weeks follow these listed precautions:  Do not bend the operated hip past 90 degrees (for example sitting in a low couch or toilet). Use a raised seat on your toilet for 6-8 weeks. If you find yourself in a low seat, keep your legs apart (don t let the knees touch) and kneecaps facing forward when getting up. Please ask for help.    Do not cross the midline of your body with the operated leg.  Do not rotate the operated leg inward, your toes and kneecap should point toward the ceiling when in bed.       Normal findings after surgery:  Numbness and tenderness around the incisions.   You may have bruising around the incisions and down the thigh.   Low grade fevers less than 100.5 degrees Fahrenheit are normal.   You will have some increased pain after your therapy sessions.     When to call the Office:  Temperature greater than 101.5 degrees Fahreheit.  Fever, chills, and increasing pain in the hip.  Excessive drainage from the incisions that include bright red blood.  Drainage from the incisions sites that appear yellow, pus-like, or foul smelling.  Increasing pain the hip not relieved by the prescribed pain  medications or ice.  Persistent nausea or vomiting   Increased pain or swelling in your calf area (in back above your ankle) that wasn t there when in the hospital.  Any other effects you feel are significant.  Call 911 if you experience any chest pain and/or shortness or breath.

## 2021-02-03 NOTE — PROGRESS NOTES
Care Management Discharge Note    Discharge Date: 02/03/21       Discharge Disposition: Home Care, Other (Comments)(Plans to stay at mother's home during recovery)    Discharge Services: None    Discharge Transportation: family or friend will provide- brother     Private pay costs discussed: Not applicable      Patient/family educated on Medicare website which has current facility and service quality ratings: yes    Education Provided on the Discharge Plan:  yes    Persons Notified of Discharge Plans: Patient    Patient/Family in Agreement with the Plan: yes    Handoff Referral Completed: No- not needed.       DORIE Nix  [facility]   [phone]

## 2021-02-03 NOTE — PROGRESS NOTES
"[facility]  Orthopedics Progress Note           Assessment and Plan:    Assessment:   Post-operative day #1 Procedure(s):  Right total hip replacement  Hypertension and diabetes        Plan:   Encourage IS  Start or continue physical therapy  Activity as tolerated  Weight-bear as tolerated R LE  Discharge from hospital today.  Pain control measures  Advance diet as tolerated  Routine wound care.  Posterior hip precautions  DVT Prophylaxis: Xeralto 10mg x 35 days.   PT:home with assist;home with home care physical therapy  Case Management: To set up home care physical therapy  No acute medical issues.  Discharge to Mother's home with home care physical therapy. All discharge orders in and narcotic prescription sent to pharmacy per SHALONDA Gandara, CNP.  Discharge summary done soon.             Interval History:   Doing well.  Continues to improve.  Pain is well-controlled.  No fevers.  Discussed surgery and reviewed x-rays and discussed follow-up as well as wound care.  Patient able to go from a seated to a lying position without any problems.            Review of Systems:    The patient denies any chest pain, shortness of breath, excessive pain, fever, chills, purulent drainage from the wound, nausea or vomiting.               Physical Exam:   General: awake, alert, appropriate and in no acute distress.  Patient sitting up at the side of the bed.  Blood pressure 124/68, pulse 80, temperature 98  F (36.7  C), temperature source Oral, resp. rate 20, height 1.854 m (6' 1\"), weight 122.4 kg (269 lb 13.5 oz), SpO2 95 %.  Hemoglobin   Date Value Ref Range Status   02/03/2021 12.7 (L) 13.3 - 17.7 g/dL Final     No results found for: INR  Right hip:  Dressing clean, dry and intact. Surrounding skin intact, no breakdown.  Calves are soft, nontender with no significant swelling. Distal neurovascular grossly intact.  Compartments soft and non-tender.  2+ distal pulse, sensation intact to foot, able to df/pf " against resistance, 5 out of 5 strength.  Toes warm to the touch.  Patient was able to go from a seated position to a lying position without problems.           Data:     Results for orders placed or performed during the hospital encounter of 02/02/21 (from the past 24 hour(s))   Glucose by meter   Result Value Ref Range    Glucose 149 (H) 70 - 99 mg/dL   Glucose by meter   Result Value Ref Range    Glucose 162 (H) 70 - 99 mg/dL   XR Pelvis w Hip Port Right 1 View    Narrative    XR PELVIS AD HIP PORTABLE RIGHT 1 VIEW 2/2/2021 2:05 PM     HISTORY: Status post Hip surgery      Impression    IMPRESSION: Right total hip arthroplasty without apparent  complication. Postoperative soft tissue gas is noted. Left total hip  arthroplasty is noted.    DAYANARA FLOOD MD   Hemoglobin   Result Value Ref Range    Hemoglobin 12.7 (L) 13.3 - 17.7 g/dL   Glucose   Result Value Ref Range    Glucose 197 (H) 70 - 99 mg/dL

## 2021-02-03 NOTE — PROGRESS NOTES
Patient vital signs are at baseline: Yes  Patient able to ambulate as they were prior to admission or with assist devices provided by therapies during their stay:  No,  Reason:  Block was still hindering walking, was not able to feel below knee area until around 1830 where now he can feel all of his legs/feet.   Patient MUST void prior to discharge:  No,  Reason:  Due to void, no urge  Patient able to tolerate oral intake:  Yes  Pain has adequate pain control using Oral analgesics:  Yes, one oxycodone given this evening, patient is hesitant to take medication as he does not want to get dependent on them. Tylenol was given as ordered.

## 2021-02-03 NOTE — PLAN OF CARE
Physical Therapy Discharge Summary    Reason for therapy discharge:    Discharged to home with home therapy.    Progress towards therapy goal(s). See goals on Care Plan in [facility] electronic health record for goal details.  Goals met    Therapy recommendation(s):    Continued therapy is recommended.  Rationale/Recommendations:  He would benefit from continued therapeutic intervention in the form of HHPT to address post operative impairements in order to progress him towards greater function and IND. .     Thank you for your referral.  An Horowitz, PT, DPT, ATC    [facility]  O: [phone]  E: [email or web address]

## 2021-02-03 NOTE — CONSULTS
Care Management Initial Consult    General Information  Assessment completed with: Patient,    Type of CM/SW Visit: Initial Assessment    Primary Care Provider verified and updated as needed: Yes   Readmission within the last 30 days:        Reason for Consult: discharge planning  Advance Care Planning:    Has no ACP documents        Communication Assessment  Patient's communication style: spoken language (English or Bilingual)    Hearing Difficulty or Deaf: no   Wear Glasses or Blind: yes    Cognitive  Cognitive/Neuro/Behavioral: WDL  Level of Consciousness: alert  Arousal Level: opens eyes spontaneously                Living Environment:   People in home: alone     Current living Arrangements: house      Able to return to prior arrangements: yes       Family/Social Support:  Care provided by: self  Provides care for: no one  Marital Status: Single  Parent(s), Sibling(s)          Description of Support System: Supportive, Involved    Support Assessment: Adequate family and caregiver support, Adequate social supports    Current Resources:   Patient receiving home care services: No     Community Resources: None  Equipment currently used at home: cane, straight, grab bar, toilet, grab bar, tub/shower, raised toilet seat, walker, rolling  Supplies currently used at home: None    Employment/Financial:  Employment Status:          Financial Concerns: No concerns identified           Lifestyle & Psychosocial Needs:        Socioeconomic History     Marital status: Single     Spouse name: Not on file     Number of children: Not on file     Years of education: Not on file     Highest education level: Not on file     Tobacco Use     Smoking status: Former Smoker     Packs/day: 2.00     Years: 20.00     Pack years: 40.00     Types: Cigarettes     Quit date: 1999     Years since quittin.6     Smokeless tobacco: Former User   Substance and Sexual Activity     Alcohol use: Yes     Alcohol/week: 0.0 standard drinks      Comment: very little     Drug use: No     Sexual activity: Not Currently       Functional Status:  Prior to admission patient needed assistance:   Dependent ADLs:: Ambulation-cane  Dependent IADLs:: Independent       Mental Health Status:  Mental Health Status: No Current Concerns       Chemical Dependency Status:  Chemical Dependency Status: No Current Concerns             Values/Beliefs:  Spiritual, Cultural Beliefs, Pent[employer]alism Practices, Values that affect care: yes  Description of Beliefs that Will Affect Care: Religious            Additional Information:  Writer visited with patient regarding the option of home health PT services.  Pt was provided with Medicare certified home care list. Patient most interested in [facility] (Phone: [phone]) and [facility].  Writer called both agencies and they do not service the [place], [place] area.  Patient stated that he will be staying at his mother's home [place] [address] [place] [address], during his recovery.  He stated that the home is handicap accessible and his sister also lives there.      Writer then called [facility] (not able to take any outside referrals as they do not have the staff and capacity to take on any new referrals at this time)  and [facility] (they do not have staff to accept this referral.)  Writer called [facility] care and they did not have PT staff available until next week.  Writer called [facility] rehab out of [place] #[phone].  They are able to accept this patient and do service the [place], [place] area.  Writer has provided patient with a printout of information regarding [facility] care and their contact information in case needed.  Patient in agreement with this agency and writer made the referral.  They will be calling patient to set up the schedule.      No other discharge needs indicated.  Patient stated that his brother will be transporting him to his  mother's home today.       [facility]  [facility]   [phone]

## 2021-02-04 ENCOUNTER — TELEPHONE (OUTPATIENT)
Dept: ORTHOPEDICS | Facility: CLINIC | Age: 61
End: 2021-02-04

## 2021-02-04 DIAGNOSIS — Z53.9 DIAGNOSIS NOT YET DEFINED: Primary | ICD-10-CM

## 2021-02-04 NOTE — TELEPHONE ENCOUNTER
Attempted to call both patient and home care nurse without answer. LM with patient. The nurse's mailbox was full and couldn't leave a message.  Leslee Sierra RN on 2/4/2021 at 3:17 PM

## 2021-02-04 NOTE — TELEPHONE ENCOUNTER
Trinidad from [facility] called and said Fredy refuses to take the xaretlto and instead he is taking 2 aspirin a day.  If you are concerned call Trinidad at [phone] otherwise this is a FYI>

## 2021-02-04 NOTE — TELEPHONE ENCOUNTER
Attempted phone call to both patient and nurse without answer.    Please try calling again Friday!    Leslee Sierra RN on 2/4/2021 at 4:17 PM

## 2021-02-04 NOTE — TELEPHONE ENCOUNTER
We recommended xarelto due to a higher risk of DVT/PE/CVA due to his previous cardiac history, however if he is refusing to take Xarelto, then the next choice would be 325mg BID aspirin x 42 days.  This would not be ideal but would be better than nothing.     Please contact patient and determine reason for not taking the Xarelto along with any red flags, concerns of bleeding, etc and discuss proper DVT prevention dosing of 325mg aspirin twice daily.      SHALONDA Mcfarland, CNP  Orthopedic Surgery

## 2021-02-04 NOTE — TELEPHONE ENCOUNTER
As the anticoagulation is part of the total joint arthroplasty protocol, I would make the patient's orthopedic surgeon aware of this decision.    With respect to his underlying atrial arrhythmia, he has already, on several occasions, clearly refused anticoagulant therapy.      Mariela

## 2021-02-05 ENCOUNTER — TELEPHONE (OUTPATIENT)
Dept: INTERNAL MEDICINE | Facility: CLINIC | Age: 61
End: 2021-02-05

## 2021-02-05 NOTE — TELEPHONE ENCOUNTER
"Writing nurse returned call to [facility] who reported patient was adamant that taking Xarelto would cause him to \"bleed to death\" from watching advertisements on television.   [facility] nurse informed [facility] to have patient take 325 mg aspirin BID for 42 days along with educating patient on the s/s of DVT and when to contact provider.    [facility] nurse reported patient is taking 325 mg aspirin BID. Patient has also been educated on DVT prevention, etc.    Cheri Colvin RN on 2/5/2021 at 11:53 AM      "

## 2021-02-05 NOTE — TELEPHONE ENCOUNTER
Vic calling from [facility]. She is requesting verbal orders for physical therapy 2x per week for 9 weeks, Skilled nurse visit 1x per week for 2 weeks, and 1 PRN visit as needed. She would like the orders by Monday 2/8. Please advise. Benita Spring LPN

## 2021-02-17 ENCOUNTER — OFFICE VISIT (OUTPATIENT)
Dept: ORTHOPEDICS | Facility: CLINIC | Age: 61
End: 2021-02-17
Payer: COMMERCIAL

## 2021-02-17 ENCOUNTER — ANCILLARY PROCEDURE (OUTPATIENT)
Dept: GENERAL RADIOLOGY | Facility: CLINIC | Age: 61
End: 2021-02-17
Attending: PHYSICIAN ASSISTANT
Payer: COMMERCIAL

## 2021-02-17 VITALS
SYSTOLIC BLOOD PRESSURE: 124 MMHG | DIASTOLIC BLOOD PRESSURE: 70 MMHG | BODY MASS INDEX: 35.65 KG/M2 | WEIGHT: 269 LBS | HEIGHT: 73 IN

## 2021-02-17 DIAGNOSIS — Z47.1 AFTERCARE FOLLOWING RIGHT HIP JOINT REPLACEMENT SURGERY: ICD-10-CM

## 2021-02-17 DIAGNOSIS — Z96.641 STATUS POST TOTAL REPLACEMENT OF RIGHT HIP: ICD-10-CM

## 2021-02-17 DIAGNOSIS — Z96.641 STATUS POST TOTAL REPLACEMENT OF RIGHT HIP: Primary | ICD-10-CM

## 2021-02-17 DIAGNOSIS — Z96.641 AFTERCARE FOLLOWING RIGHT HIP JOINT REPLACEMENT SURGERY: ICD-10-CM

## 2021-02-17 PROCEDURE — 73502 X-RAY EXAM HIP UNI 2-3 VIEWS: CPT | Mod: TC | Performed by: RADIOLOGY

## 2021-02-17 PROCEDURE — 99024 POSTOP FOLLOW-UP VISIT: CPT | Performed by: PHYSICIAN ASSISTANT

## 2021-02-17 ASSESSMENT — PAIN SCALES - GENERAL: PAINLEVEL: NO PAIN (1)

## 2021-02-17 ASSESSMENT — MIFFLIN-ST. JEOR: SCORE: 2079.06

## 2021-02-17 NOTE — LETTER
2/17/2021         RE: Fredy Clark  [address] [facility] [address]        Dear Colleague,    Thank you for referring your patient, Fredy Clark, to the [facility]. Please see a copy of my visit note below.    Orthopedic Clinic Post-Operative Note    CHIEF COMPLAINT:   Chief Complaint   Patient presents with     Surgical Followup     DOS: 2/2/2021~Right total hip arthroplasty~2 weeks postop       HISTORY OF PRESENT ILLNESS  Location: Right hip  Rating of Pain: Minimal  Pain is better with: Tylenol  Pain improving overall: Yes  Associated Features: Patient denies any fevers chills or any drainage from his incision.  Patient concerns: Not taking Xarelto because he is worried about bleeding but he did take 325 of aspirin twice a day since he was out of the hospital.  Working with home physical therapy.  Transitioning from walker to now cane.  Additional History: Patient states that swelling has gone down and his bruising is getting better.    Patient's past medical, surgical, social and family histories reviewed.     Past Medical History:   Diagnosis Date     Coronary atherosclerosis of unspecified type of vessel, native or graft     Coronary artery disease     Obesity, Class II, BMI 35-39.9, with comorbidity 10/26/2015     Obesity, unspecified      Other and unspecified hyperlipidemia      Type II or unspecified type diabetes mellitus without mention of complication, not stated as uncontrolled      Unspecified essential hypertension        Past Surgical History:   Procedure Laterality Date     ARTHROPLASTY HIP Left 1/5/2016    Procedure: ARTHROPLASTY HIP;  Surgeon: Dejon Luis DO;  [facility]     ARTHROPLASTY HIP Right 2/2/2021    Procedure: Right total hip replacement;  Surgeon: Dejon Luis DO;  [facility]     C ANESTH,CARDIOVERTER/DEFIB  01/2001    Implant defibrillator, 06/2006 - pulse generator change serial # [id number]     C CABG,  ARTERY-VEIN, FIVE       HC PPM GENERATOR REMOVAL  06/16/06    [facility]- removed Medtronic [id number] replaced with Medtronic Entrust#[id number]       Medications:  [START ON 3/9/2021] aspirin (ASA) 325 MG EC tablet, Take 1 tablet (325 mg) by mouth every 6 hours as needed for moderate pain  acetaminophen (TYLENOL) 325 MG tablet, Take 3 tablets (975 mg) by mouth every 8 hours as needed for other (mild pain)  CAYENNE 500 MG OR CAPS, 1 tab twice daily  hydrOXYzine (ATARAX) 25 MG tablet, Take 1 tablet (25 mg) by mouth every 6 hours as needed for itching or anxiety (with pain, moderate pain)  lisinopril-hydrochlorothiazide (ZESTORETIC) 20-25 MG tablet, TAKE TWO TABLETS BY MOUTH DAILY  metFORMIN (GLUCOPHAGE) 1000 MG tablet, TAKE ONE TABLET BY MOUTH TWICE A DAY WITH MEALS  MULTIVITAMIN TABS   OR, 1 tab daily  OMEGA-3 CAPS   OR, 1200 mg.--1 tab bid  oxyCODONE (ROXICODONE) 5 MG tablet, Take 1-2 tablets (5-10 mg) by mouth every 3 hours as needed for pain (Moderate to Severe) (Patient not taking: Reported on 2/17/2021)  polyethylene glycol (MIRALAX) 17 g packet, Take 17 g by mouth daily  rivaroxaban ANTICOAGULANT (XARELTO) 10 MG TABS tablet, Take 1 tablet (10 mg) by mouth daily (Patient not taking: Reported on 2/17/2021)  senna-docusate (SENOKOT-S/PERICOLACE) 8.6-50 MG tablet, Take 1-2 tablets by mouth 2 times daily Take while on oral narcotics to prevent or treat constipation.  tiZANidine (ZANAFLEX) 2 MG tablet, Take 1-2 tablets (2-4 mg) by mouth every 6 hours as needed for muscle spasms (pain) (Patient not taking: Reported on 2/17/2021)  VITAMIN C 500 MG OR TABS, 1 tab daily    No current facility-administered medications on file prior to visit.       Allergies   Allergen Reactions     No Known Drug Allergies        Social History     Occupational History     Not on file   Tobacco Use     Smoking status: Former Smoker     Packs/day: 2.00     Years: 20.00     Pack years: 40.00     Types: Cigarettes     Quit date: 6/1/1999  "    Years since quittin.7     Smokeless tobacco: Former User   Substance and Sexual Activity     Alcohol use: Yes     Alcohol/week: 0.0 standard drinks     Comment: very little     Drug use: No     Sexual activity: Not Currently       No family history on file.    REVIEW OF SYSTEMS  General: negative for, night sweats, dizziness, fatigue  Resp: No shortness of breath and no cough  CV: negative for chest pain, syncope or near-syncope  GI: negative for nausea, vomiting and diarrhea  : negative for dysuria and hematuria  Musculoskeletal: as above  Neurologic: negative for syncope   Hematologic: negative for bleeding disorder    Physical Exam:  Vitals: /70   Ht 1.854 m (6' 1\")   Wt 122 kg (269 lb)   BMI 35.49 kg/m    BMI= Body mass index is 35.49 kg/m .  Constitutional: healthy, alert and no acute distress   Psychiatric: mentation appears normal and affect normal/bright  NEURO: no focal deficits, CMS intact right lower extremity   RESP: Normal with easy respirations and no use of accessory muscles to breathe, no audible wheezing or retractions  CV: Calf soft and nontender to palpation, leg warm   SKIN: Incision healing well with no drainage and no erythema or swelling around it.  There is some ecchymosis just posterior to the incision.  The rest the leg with no erythema, rashes, excoriation, or breakdown. No evidence of infection.   MUSCULOSKELETAL:    INSPECTION of right hip: No gross deformities, erythema, edema, ecchymosis, atrophy or fasciculations.     PALPATION: no tenderness over the lateral hip and greater trochanteric region, thigh or lower leg.     ROM: Passive flexion to 125, internal rotation to 40, external rotation to 20.  Without catching locking or pain.     STRENGTH: 4 out of 5 hip flexion, quad and 5 out of 5 hamstring     SPECIAL TEST: None  GAIT: Not observed  Lymph: no palpable lymph nodes    Diagnostic Modalities:  Recent Results (from the past 744 hour(s))   XR Pelvis w Hip Port " Right 1 View    Narrative    XR PELVIS AD HIP PORTABLE RIGHT 1 VIEW 2/2/2021 2:05 PM     HISTORY: Status post Hip surgery      Impression    IMPRESSION: Right total hip arthroplasty without apparent  complication. Postoperative soft tissue gas is noted. Left total hip  arthroplasty is noted.    DAYANARA FLOOD MD     I agree with above reading.    Today we got AP pelvis as well as AP and lateral of the right hip.  These x-rays show good prosthesis placement no prosthesis failure and no loosening.  Also these x-rays are compared to the x-rays done on 2/2/2021 shows no change.  No fracture no dislocation no tumor.    Independent visualization of the images was performed.      Impression: 1.  15 days status post right total hip arthroplasty by Dr. Luis    FOCUSED PLAN:   Patient doing well and improving.  He has home physical therapy currently.  He is ambulating with a cane now from a walker.  He is not taking any narcotic anymore just Tylenol 3 times a day.  Discussed okay to start massaging incision in 1 week.  PT: Put in an order for outpatient physical therapy today for when his in-home is done. Anticoagulation: Patient was to take Xarelto once a day 10 mg for 35 days however he has a lot of anxiety about bleeding as he had a small bleeding issue with his last hip.  He did not take the Xarelto after hospitalization and instead has taken 325 of aspirin twice a day.  We had another discussion about why we do Xarelto because of his cardiac history.  He still would like to refuse the Xarelto.  I told him he needs to take that aspirin twice a day until he is at 6 weeks after surgery.  He understands the risks of not taking the Xarelto and taking the aspirin instead and will accept this.  I did notify Dr. Luis of his decision and we will proceed with this plan.  We discussed continue posterior hip precautions for a total of 6 weeks status post surgery.  Follow-up with Dr. Luis in 4 weeks which would be 6 weeks status  post surgery.     Re-x-ray on return: No    BP Readings from Last 1 Encounters:   02/17/21 124/70       BP noted to be well controlled today in office.      Patient does not use Tobacco products.    This note was dictated with [employer].    Ruy Mane PA-C              Again, thank you for allowing me to participate in the care of your patient.        Sincerely,        Ruy Mane PA-C

## 2021-02-17 NOTE — PROGRESS NOTES
Orthopedic Clinic Post-Operative Note    CHIEF COMPLAINT:   Chief Complaint   Patient presents with     Surgical Followup     DOS: 2/2/2021~Right total hip arthroplasty~2 weeks postop       HISTORY OF PRESENT ILLNESS  Location: Right hip  Rating of Pain: Minimal  Pain is better with: Tylenol  Pain improving overall: Yes  Associated Features: Patient denies any fevers chills or any drainage from his incision.  Patient concerns: Not taking Xarelto because he is worried about bleeding but he did take 325 of aspirin twice a day since he was out of the hospital.  Working with home physical therapy.  Transitioning from walker to now cane.  Additional History: Patient states that swelling has gone down and his bruising is getting better.    Patient's past medical, surgical, social and family histories reviewed.     Past Medical History:   Diagnosis Date     Coronary atherosclerosis of unspecified type of vessel, native or graft     Coronary artery disease     Obesity, Class II, BMI 35-39.9, with comorbidity 10/26/2015     Obesity, unspecified      Other and unspecified hyperlipidemia      Type II or unspecified type diabetes mellitus without mention of complication, not stated as uncontrolled      Unspecified essential hypertension        Past Surgical History:   Procedure Laterality Date     ARTHROPLASTY HIP Left 1/5/2016    Procedure: ARTHROPLASTY HIP;  Surgeon: Dejon Luis DO;  [facility]     ARTHROPLASTY HIP Right 2/2/2021    Procedure: Right total hip replacement;  Surgeon: Dejon Luis DO;  [facility]     C ANESTH,CARDIOVERTER/DEFIB  01/2001    Implant defibrillator, 06/2006 - pulse generator change serial # PNR [id number]     C CABG, ARTERY-VEIN, FIVE       HC PPM GENERATOR REMOVAL  06/16/06    [facility]- removed Medtronic [id number] replaced with Medtronic Entrust#[id number]       Medications:  [START ON 3/9/2021] aspirin (ASA) 325 MG EC tablet, Take 1 tablet (325 mg) by mouth  every 6 hours as needed for moderate pain  acetaminophen (TYLENOL) 325 MG tablet, Take 3 tablets (975 mg) by mouth every 8 hours as needed for other (mild pain)  CAYENNE 500 MG OR CAPS, 1 tab twice daily  hydrOXYzine (ATARAX) 25 MG tablet, Take 1 tablet (25 mg) by mouth every 6 hours as needed for itching or anxiety (with pain, moderate pain)  lisinopril-hydrochlorothiazide (ZESTORETIC) 20-25 MG tablet, TAKE TWO TABLETS BY MOUTH DAILY  metFORMIN (GLUCOPHAGE) 1000 MG tablet, TAKE ONE TABLET BY MOUTH TWICE A DAY WITH MEALS  MULTIVITAMIN TABS   OR, 1 tab daily  OMEGA-3 CAPS   OR, 1200 mg.--1 tab bid  oxyCODONE (ROXICODONE) 5 MG tablet, Take 1-2 tablets (5-10 mg) by mouth every 3 hours as needed for pain (Moderate to Severe) (Patient not taking: Reported on 2021)  polyethylene glycol (MIRALAX) 17 g packet, Take 17 g by mouth daily  rivaroxaban ANTICOAGULANT (XARELTO) 10 MG TABS tablet, Take 1 tablet (10 mg) by mouth daily (Patient not taking: Reported on 2021)  senna-docusate (SENOKOT-S/PERICOLACE) 8.6-50 MG tablet, Take 1-2 tablets by mouth 2 times daily Take while on oral narcotics to prevent or treat constipation.  tiZANidine (ZANAFLEX) 2 MG tablet, Take 1-2 tablets (2-4 mg) by mouth every 6 hours as needed for muscle spasms (pain) (Patient not taking: Reported on 2021)  VITAMIN C 500 MG OR TABS, 1 tab daily    No current facility-administered medications on file prior to visit.       Allergies   Allergen Reactions     No Known Drug Allergies        Social History     Occupational History     Not on file   Tobacco Use     Smoking status: Former Smoker     Packs/day: 2.00     Years: 20.00     Pack years: 40.00     Types: Cigarettes     Quit date: 1999     Years since quittin.7     Smokeless tobacco: Former User   Substance and Sexual Activity     Alcohol use: Yes     Alcohol/week: 0.0 standard drinks     Comment: very little     Drug use: No     Sexual activity: Not Currently       No family  "history on file.    REVIEW OF SYSTEMS  General: negative for, night sweats, dizziness, fatigue  Resp: No shortness of breath and no cough  CV: negative for chest pain, syncope or near-syncope  GI: negative for nausea, vomiting and diarrhea  : negative for dysuria and hematuria  Musculoskeletal: as above  Neurologic: negative for syncope   Hematologic: negative for bleeding disorder    Physical Exam:  Vitals: /70   Ht 1.854 m (6' 1\")   Wt 122 kg (269 lb)   BMI 35.49 kg/m    BMI= Body mass index is 35.49 kg/m .  Constitutional: healthy, alert and no acute distress   Psychiatric: mentation appears normal and affect normal/bright  NEURO: no focal deficits, CMS intact right lower extremity   RESP: Normal with easy respirations and no use of accessory muscles to breathe, no audible wheezing or retractions  CV: Calf soft and nontender to palpation, leg warm   SKIN: Incision healing well with no drainage and no erythema or swelling around it.  There is some ecchymosis just posterior to the incision.  The rest the leg with no erythema, rashes, excoriation, or breakdown. No evidence of infection.   MUSCULOSKELETAL:    INSPECTION of right hip: No gross deformities, erythema, edema, ecchymosis, atrophy or fasciculations.     PALPATION: no tenderness over the lateral hip and greater trochanteric region, thigh or lower leg.     ROM: Passive flexion to 125, internal rotation to 40, external rotation to 20.  Without catching locking or pain.     STRENGTH: 4 out of 5 hip flexion, quad and 5 out of 5 hamstring     SPECIAL TEST: None  GAIT: Not observed  Lymph: no palpable lymph nodes    Diagnostic Modalities:  Recent Results (from the past 744 hour(s))   XR Pelvis w Hip Port Right 1 View    Narrative    XR PELVIS AD HIP PORTABLE RIGHT 1 VIEW 2/2/2021 2:05 PM     HISTORY: Status post Hip surgery      Impression    IMPRESSION: Right total hip arthroplasty without apparent  complication. Postoperative soft tissue gas is " noted. Left total hip  arthroplasty is noted.    DAYANARA FLOOD MD     I agree with above reading.    Today we got AP pelvis as well as AP and lateral of the right hip.  These x-rays show good prosthesis placement no prosthesis failure and no loosening.  Also these x-rays are compared to the x-rays done on 2/2/2021 shows no change.  No fracture no dislocation no tumor.    Independent visualization of the images was performed.      Impression: 1.  15 days status post right total hip arthroplasty by Dr. Luis    FOCUSED PLAN:   Patient doing well and improving.  He has home physical therapy currently.  He is ambulating with a cane now from a walker.  He is not taking any narcotic anymore just Tylenol 3 times a day.  Discussed okay to start massaging incision in 1 week.  PT: Put in an order for outpatient physical therapy today for when his in-home is done. Anticoagulation: Patient was to take Xarelto once a day 10 mg for 35 days however he has a lot of anxiety about bleeding as he had a small bleeding issue with his last hip.  He did not take the Xarelto after hospitalization and instead has taken 325 of aspirin twice a day.  We had another discussion about why we do Xarelto because of his cardiac history.  He still would like to refuse the Xarelto.  I told him he needs to take that aspirin twice a day until he is at 6 weeks after surgery.  He understands the risks of not taking the Xarelto and taking the aspirin instead and will accept this.  I did notify Dr. Luis of his decision and we will proceed with this plan.  We discussed continue posterior hip precautions for a total of 6 weeks status post surgery.  Follow-up with Dr. Luis in 4 weeks which would be 6 weeks status post surgery.     Re-x-ray on return: No    BP Readings from Last 1 Encounters:   02/17/21 124/70       BP noted to be well controlled today in office.      Patient does not use Tobacco products.    This note was dictated with Juan Ramon  Medical.    Ruy Mane PA-C

## 2021-02-19 ENCOUNTER — TELEPHONE (OUTPATIENT)
Dept: INTERNAL MEDICINE | Facility: CLINIC | Age: 61
End: 2021-02-19

## 2021-02-19 NOTE — TELEPHONE ENCOUNTER
Home Health Certification and Plan of Care forms received.     Certification Period from 2/4/2021 to 4/4/2021.    Forms placed in the lakes folder.     JESIKA AvelarN, RN  [facility]

## 2021-02-22 NOTE — TELEPHONE ENCOUNTER
Medications reconciled on [facility] form, changes noted on form.  Form to PCP for signature.    Per [facility] form patient taking 325 mg Aspirin BID instead of taking the Xarelto?        Shiloh Velez RN  [facility]

## 2021-03-02 ENCOUNTER — HOSPITAL ENCOUNTER (OUTPATIENT)
Dept: PHYSICAL THERAPY | Facility: CLINIC | Age: 61
Setting detail: THERAPIES SERIES
End: 2021-03-02
Attending: INTERNAL MEDICINE
Payer: COMMERCIAL

## 2021-03-02 PROCEDURE — 97110 THERAPEUTIC EXERCISES: CPT | Mod: GP | Performed by: PHYSICAL THERAPIST

## 2021-03-02 PROCEDURE — 97530 THERAPEUTIC ACTIVITIES: CPT | Mod: GP | Performed by: PHYSICAL THERAPIST

## 2021-03-02 PROCEDURE — 97161 PT EVAL LOW COMPLEX 20 MIN: CPT | Mod: GP | Performed by: PHYSICAL THERAPIST

## 2021-03-02 NOTE — PROGRESS NOTES
03/02/21 0800   General Information   Type of Visit Initial OP Ortho PT Evaluation   Start of Care Date 03/02/21   Referring Physician VANNA Red   Patient/Family Goals Statement less pain, walk normally, may get back to work beginning of April 2021.    Orders Evaluate and Treat   Date of Order 02/02/21   Certification Required? No   Medical Diagnosis status post right total hip replacement   Surgical/Medical history reviewed Yes   Precautions/Limitations right hip precautions   Weight-Bearing Status - RLE weight-bearing as tolerated   General Information Comments 2 weeks in home physical therapy followed by outpatient physical therapy, 6 weeks posterior hip precautions., R BRANDON 2/2/21   Body Part(s)   Body Part(s) Hip   Presentation and Etiology   Pertinent history of current problem (include personal factors and/or comorbidities that impact the POC) Pt had R BRANDON on 2/2/21 and DC'd home 2/3/21 but stayed with his mother initially. Pt had home PT up until 2/17/21 or so. Recent follow with ortho went well, x rays looked good. Pt taking tylenol for pain. Pt had L BRANDON Jan of 2016. Pt noted he should have had R BRANDON 2 years ago but was delayed due to some health issues and later delayed due to covid. Notes he has been walking for exercise, not doing BRANDON ex every day.    Impairments B. Decreased WB tolerance;A. Pain;D. Decreased ROM;E. Decreased flexibility;F. Decreased strength and endurance;G. Impaired balance;H. Impaired gait   Functional Limitations perform activities of daily living;perform required work activities;perform desired leisure / sports activities   Symptom Location R lateral hip and posterior, also some LBP   How/Where did it occur From Degenerative Joint Disease   Onset date of current episode/exacerbation 02/02/21  (surgery date)   Chronicity New   Pain rating (0-10 point scale) Best (/10);Worst (/10)   Best (/10) average 1/10 for R hip, low back usually 0/10   Worst (/10) R hip worst 5/10    Pain quality B. Dull;C. Aching   Frequency of pain/symptoms A. Constant   Pain/symptoms are: The same all the time   Pain/symptoms exacerbated by A. Sitting;B. Walking;G. Certain positions;J. ADL;K. Home tasks;L. Work tasks;M. Other   Pain exacerbation comment too low of chair, slightly with lying on R side, transfers   Pain/symptoms eased by C. Rest;I. OTC medication(s)   Progression of symptoms since onset: Improved   Prior Level of Function   Prior Level of Function-Mobility used cane x 2 years   Prior Level of Function-ADLs used sock aid prior, hadn't used socks on in 2 years   Functional Level Prior Comment active with beef [employer] but normally sits more than 75% of day   Current Level of Function   Patient role/employment history A. 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 [facility] [employer]  (lives alone)   Home/community accessibility split entry, notes stairs going well   Current equipment-Gait/Locomotion Standard cane;Front wheeled walker   Fall Risk Screen   Fall screen completed by PT   Have you fallen 2 or more times in the past year? No   Have you fallen and had an injury in the past year? No   Is patient a fall risk? No   Abuse Screen (yes response referral indicated)   Feels Unsafe at Home or Work/School no   Feels Threatened by Someone no   Does Anyone Try to Keep You From Having Contact with Others or Doing Things Outside Your Home? no   Physical Signs of Abuse Present no   Functional Scales   Functional Scales Other   Other Scales  LEFS 31/80, reported functional level 25% compared to before 2 years ago   Hip Objective Findings   Side (if bilateral, select both right and left) Right   Observation pt prefers to sit in high chair or raised table, slow transfers and mobility   Integumentary  no concerns   Posture kyphotic lumbar spine sitting posture, in standing trunk flexed 23 degrees, R knee flexed, R LE ER, leans to R   Gait/Locomotion using  cane in L hand, slow gait, limp, not getting full R knee extension, trunk flexed 25 degrees   Right Hip Flexion PROM able to get to 80 degrees   Right Hip Abduction PROM 20 degrees   Right Hip Ext PROM in supine R hip -26 degrees from lying flat   Planned Therapy Interventions   Planned Therapy Interventions balance training;gait training;ROM;strengthening;neuromuscular re-education;stretching   Planned Therapy Interventions Comment manual therapy if needed   Planned Modality Interventions   Planned Modality Interventions Electrical stimulation;Ultrasound   Planned Modality Interventions Comments not likely needed   Clinical Impression   Criteria for Skilled Therapeutic Interventions Met yes, treatment indicated   PT Diagnosis right hip pain, decreased ROM, weakness, altered gait pattern and decreased functional level s/p R BRANDON 2/2/21   Influenced by the following impairments pain, ROM, strength, balance, flexiblity   Functional limitations due to impairments adl's, work, transfers, see LEFS   Clinical Presentation Stable/Uncomplicated   Clinical Presentation Rationale noting improvement   Clinical Decision Making (Complexity) Low complexity   Therapy Frequency 2 times/Week   Predicted Duration of Therapy Intervention (days/wks) 8 weeks, decrease as able   Risk & Benefits of therapy have been explained Yes   Patient, Family & other staff in agreement with plan of care Yes   Education Assessment   Preferred Learning Style Listening;Reading;Demonstration   Barriers to Learning No barriers   ORTHO GOALS   PT Ortho Eval Goals 1;2   Ortho Goal 1   Goal Identifier ROM   Goal Description pt will irmprove R hip extension AROM to greater than 0 degrees and R knee extension to near 0 degrees so pt can have normal standing and carry over to normalizing ambulation without assistive device x 200 fetet or longer   Target Date 03/23/21   Ortho Goal 2   Goal Identifier function   Goal Description pt will improve R hip ROM and  strength and carry over to improved functional level as measured by LEFS score increase from 31/80 to 40/80 or better   Target Date 03/30/21   Total Evaluation Time   PT Eval, Low Complexity Minutes (31585) 30

## 2021-03-05 ENCOUNTER — HOSPITAL ENCOUNTER (OUTPATIENT)
Dept: PHYSICAL THERAPY | Facility: CLINIC | Age: 61
Setting detail: THERAPIES SERIES
End: 2021-03-05
Attending: NURSE PRACTITIONER
Payer: COMMERCIAL

## 2021-03-05 PROCEDURE — 97110 THERAPEUTIC EXERCISES: CPT | Mod: GP | Performed by: PHYSICAL THERAPIST

## 2021-03-09 ENCOUNTER — HOSPITAL ENCOUNTER (OUTPATIENT)
Dept: PHYSICAL THERAPY | Facility: CLINIC | Age: 61
Setting detail: THERAPIES SERIES
End: 2021-03-09
Attending: NURSE PRACTITIONER
Payer: COMMERCIAL

## 2021-03-09 PROCEDURE — 97110 THERAPEUTIC EXERCISES: CPT | Mod: GP | Performed by: PHYSICAL THERAPIST

## 2021-03-09 PROCEDURE — 97530 THERAPEUTIC ACTIVITIES: CPT | Mod: GP | Performed by: PHYSICAL THERAPIST

## 2021-03-15 ENCOUNTER — HOSPITAL ENCOUNTER (OUTPATIENT)
Dept: PHYSICAL THERAPY | Facility: CLINIC | Age: 61
Setting detail: THERAPIES SERIES
End: 2021-03-15
Attending: NURSE PRACTITIONER
Payer: COMMERCIAL

## 2021-03-15 PROCEDURE — 97110 THERAPEUTIC EXERCISES: CPT | Mod: GP | Performed by: PHYSICAL THERAPIST

## 2021-03-15 PROCEDURE — 97530 THERAPEUTIC ACTIVITIES: CPT | Mod: GP | Performed by: PHYSICAL THERAPIST

## 2021-03-17 ENCOUNTER — OFFICE VISIT (OUTPATIENT)
Dept: ORTHOPEDICS | Facility: CLINIC | Age: 61
End: 2021-03-17
Payer: COMMERCIAL

## 2021-03-17 ENCOUNTER — HOSPITAL ENCOUNTER (OUTPATIENT)
Dept: PHYSICAL THERAPY | Facility: CLINIC | Age: 61
Setting detail: THERAPIES SERIES
End: 2021-03-17
Attending: NURSE PRACTITIONER
Payer: COMMERCIAL

## 2021-03-17 VITALS
DIASTOLIC BLOOD PRESSURE: 78 MMHG | HEIGHT: 73 IN | WEIGHT: 274.5 LBS | TEMPERATURE: 97 F | SYSTOLIC BLOOD PRESSURE: 166 MMHG | BODY MASS INDEX: 36.38 KG/M2

## 2021-03-17 DIAGNOSIS — Z96.641 STATUS POST TOTAL REPLACEMENT OF RIGHT HIP: Primary | ICD-10-CM

## 2021-03-17 PROCEDURE — 97110 THERAPEUTIC EXERCISES: CPT | Mod: GP | Performed by: PHYSICAL THERAPIST

## 2021-03-17 PROCEDURE — 99024 POSTOP FOLLOW-UP VISIT: CPT | Performed by: ORTHOPAEDIC SURGERY

## 2021-03-17 PROCEDURE — 97530 THERAPEUTIC ACTIVITIES: CPT | Mod: GP | Performed by: PHYSICAL THERAPIST

## 2021-03-17 ASSESSMENT — MIFFLIN-ST. JEOR: SCORE: 2104

## 2021-03-17 ASSESSMENT — PAIN SCALES - GENERAL: PAINLEVEL: NO PAIN (1)

## 2021-03-17 NOTE — LETTER
March 17, 2021        Fredy Clark  [address] [facility] [address]          To whom it may concern:    RE: Fredy Clark    Patient was seen and treated today at our clinic.  Patient may return to work April 5th 2021 with the following:  No restrictions    Please contact me for questions or concerns.      Sincerely,        Dejon Luis, DO

## 2021-03-17 NOTE — PROGRESS NOTES
Outpatient Physical Therapy Progress Note/Discharge Summary     Patient: Fredy Clark  : 1960    Beginning/End Dates of Reporting Period:  3/2/21 to 3/17/2021 (pt seen for 5 PT visits)    Referring Provider: Ruy Mane PAC    Therapy Diagnosis: right hip pain, decreased ROM, weakness, altered gait pattern and decreased functional level s/p R BRANDON 21     Client Self Report: R hip a little sore today but 0/10 after warming up in PT. Pleased so far with R BRANDON but taking longer to get better than when he had the L done 5 years ago. 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     Objective Measurements:  Objective Measure: LEFS ( at eval on 3/2/21)  Details: 48/80 on 3/17/20  Objective Measure: gait (at eval cane used in L handt, trunk flexed 23 degrees, R knee flexed, lean to R, limp present)  Details: cane in L hand and no asst device for short distances     Goals:  Goal Identifier ROM   Goal Description pt will irmprove R hip extension AROM to greater than 0 degrees and R knee extension to near 0 degrees so pt can have normal standing and carry over to normalizing ambulation without assistive device x 200 fetet or longer   Target Date 21   Date Met      Progress:     Goal Identifier function   Goal Description pt will improve R hip ROM and strength and carry over to improved functional level as measured by LEFS score increase from 31/80 to 40/80 or better   Target Date 21   Date Met  21   Progress:       Progress Toward Goals:   Progress this reporting period: pt meeting LEFS goal for function. Not meeting goal 1, not able to get 0 degrees R hip extension yet. Gait is improving and noted by pt much better than before surgery.     Plan:  Continue therapy per current plan of care. See 2x per week next week    Discharge:  No    Addendum: pt has not been seen for further PT after 3/17/21. He was a no show on 3/24/21.  PT has not been notified of any problems or questions since last visit. Above note will be discharge summary.

## 2021-03-17 NOTE — LETTER
3/17/2021         RE: Fredy Clark  [address] [facility] [address]        Dear Colleague,    Thank you for referring your patient, Fredy Clark, to the [facility]. Please see a copy of my visit note below.    Fredy to follow up with Primary Care provider regarding elevated blood pressure.  Emily Layton MA on 3/17/2021 at 9:45 AM        Orthopedic Clinic Post-Operative Note    CHIEF COMPLAINT:   Chief Complaint   Patient presents with     RECHECK     right hip follow up     Surgical Followup     DOS: 2/2/2021~Right total hip arthroplasty~ weeks postop       HISTORY OF PRESENT ILLNESS  Overall making progress.  Better than when he started.  Slightly slower when compared to his contralateral side.  Overall happy with his progress.    Patient's past medical, surgical, social and family histories reviewed.     Past Medical History:   Diagnosis Date     Coronary atherosclerosis of unspecified type of vessel, native or graft     Coronary artery disease     Obesity, Class II, BMI 35-39.9, with comorbidity 10/26/2015     Obesity, unspecified      Other and unspecified hyperlipidemia      Type II or unspecified type diabetes mellitus without mention of complication, not stated as uncontrolled      Unspecified essential hypertension        Past Surgical History:   Procedure Laterality Date     ARTHROPLASTY HIP Left 1/5/2016    Procedure: ARTHROPLASTY HIP;  Surgeon: Dejon Luis DO;  Location: [facility]     ARTHROPLASTY HIP Right 2/2/2021    Procedure: Right total hip replacement;  Surgeon: Dejon Luis DO;  Location: [facility] OR     C ANESTH,CARDIOVERTER/DEFIB  01/2001    Implant defibrillator, 06/2006 - pulse generator change serial # [id number]     C CABG, ARTERY-VEIN, FIVE       HC PPM GENERATOR REMOVAL  06/16/06    [facility]- removed Medtronic [id number] replaced with Medtronic Entrust#[id number]       Medications:  aspirin (ASA) 325 MG EC tablet, Take 1  tablet (325 mg) by mouth every 6 hours as needed for moderate pain  lisinopril-hydrochlorothiazide (ZESTORETIC) 20-25 MG tablet, TAKE TWO TABLETS BY MOUTH DAILY  metFORMIN (GLUCOPHAGE) 1000 MG tablet, TAKE ONE TABLET BY MOUTH TWICE A DAY WITH MEALS  MULTIVITAMIN TABS   OR, 1 tab daily  OMEGA-3 CAPS   OR, 1200 mg.--1 tab bid  VITAMIN C 500 MG OR TABS, 1 tab daily  acetaminophen (TYLENOL) 325 MG tablet, Take 3 tablets (975 mg) by mouth every 8 hours as needed for other (mild pain) (Patient not taking: Reported on 3/17/2021)  CAYENNE 500 MG OR CAPS, 1 tab twice daily  hydrOXYzine (ATARAX) 25 MG tablet, Take 1 tablet (25 mg) by mouth every 6 hours as needed for itching or anxiety (with pain, moderate pain) (Patient not taking: Reported on 3/17/2021)  oxyCODONE (ROXICODONE) 5 MG tablet, Take 1-2 tablets (5-10 mg) by mouth every 3 hours as needed for pain (Moderate to Severe) (Patient not taking: Reported on 3/17/2021)  polyethylene glycol (MIRALAX) 17 g packet, Take 17 g by mouth daily (Patient not taking: Reported on 3/17/2021)  rivaroxaban ANTICOAGULANT (XARELTO) 10 MG TABS tablet, Take 1 tablet (10 mg) by mouth daily (Patient not taking: Reported on 2021)  senna-docusate (SENOKOT-S/PERICOLACE) 8.6-50 MG tablet, Take 1-2 tablets by mouth 2 times daily Take while on oral narcotics to prevent or treat constipation. (Patient not taking: Reported on 3/17/2021)  tiZANidine (ZANAFLEX) 2 MG tablet, Take 1-2 tablets (2-4 mg) by mouth every 6 hours as needed for muscle spasms (pain) (Patient not taking: Reported on 3/17/2021)    No current facility-administered medications on file prior to visit.       Allergies   Allergen Reactions     No Known Drug Allergies        Social History     Occupational History     Not on file   Tobacco Use     Smoking status: Former Smoker     Packs/day: 2.00     Years: 20.00     Pack years: 40.00     Types: Cigarettes     Quit date: 1999     Years since quittin.8     Smokeless  "tobacco: Former User   Substance and Sexual Activity     Alcohol use: Yes     Alcohol/week: 0.0 standard drinks     Comment: very little     Drug use: No     Sexual activity: Not Currently       No family history on file.    REVIEW OF SYSTEMS  General: negative for, night sweats, dizziness, fatigue  Resp: No shortness of breath and no cough  CV: negative for chest pain, syncope or near-syncope  GI: negative for nausea, vomiting and diarrhea  : negative for dysuria and hematuria  Musculoskeletal: as above  Neurologic: negative for syncope   Hematologic: negative for bleeding disorder    Physical Exam:  Vitals: BP (!) 166/78 (BP Location: Left arm, Patient Position: Sitting, Cuff Size: Adult Large)   Temp 97  F (36.1  C) (Temporal)   Ht 1.854 m (6' 1\")   Wt 124.5 kg (274 lb 8 oz)   BMI 36.22 kg/m    BMI= Body mass index is 36.22 kg/m .  Constitutional: healthy, alert and no acute distress   Psychiatric: mentation appears normal and affect normal/bright  NEURO: no focal deficits  SKIN: .well healed, no erythema, no incision breakdown and no drainage.  JOINT/EXTREMITIES: No swelling.  Able go from seated to standing position with no issues.  Near full motion.  GAIT: not tested     Diagnostic Modalities:  Previous imaging reviewed.  Independent visualization of the images was performed.      Impression:   Chief Complaint   Patient presents with     RECHECK     right hip follow up     Surgical Followup     DOS: 2/2/2021~Right total hip arthroplasty~ weeks postop   Doing well.  Making progress.    Plan:   We discussed return to work.  He is feels like he is getting there.  Is working with therapy.  We discussed returning to work April 5.  He certainly can call me if there is any issues.  Otherwise plan to see him back in 6 weeks.  Return to clinic 6, week(s), or sooner as needed for changes.    Re-x-ray on return: Yes.    Jeremy Luis D.O.      Again, thank you for allowing me to participate in the care of your " patient.        Sincerely,        Dejon Luis, DO

## 2021-03-17 NOTE — PROGRESS NOTES
Fredy to follow up with Primary Care provider regarding elevated blood pressure.  Emily Layton MA on 3/17/2021 at 9:45 AM

## 2021-03-17 NOTE — PROGRESS NOTES
Orthopedic Clinic Post-Operative Note    CHIEF COMPLAINT:   Chief Complaint   Patient presents with     RECHECK     right hip follow up     Surgical Followup     DOS: 2/2/2021~Right total hip arthroplasty~ weeks postop       HISTORY OF PRESENT ILLNESS  Overall making progress.  Better than when he started.  Slightly slower when compared to his contralateral side.  Overall happy with his progress.    Patient's past medical, surgical, social and family histories reviewed.     Past Medical History:   Diagnosis Date     Coronary atherosclerosis of unspecified type of vessel, native or graft     Coronary artery disease     Obesity, Class II, BMI 35-39.9, with comorbidity 10/26/2015     Obesity, unspecified      Other and unspecified hyperlipidemia      Type II or unspecified type diabetes mellitus without mention of complication, not stated as uncontrolled      Unspecified essential hypertension        Past Surgical History:   Procedure Laterality Date     ARTHROPLASTY HIP Left 1/5/2016    Procedure: ARTHROPLASTY HIP;  Surgeon: Dejon Luis DO;  [facility]     ARTHROPLASTY HIP Right 2/2/2021    Procedure: Right total hip replacement;  Surgeon: Dejon Luis DO;  [facility]     C ANESTH,CARDIOVERTER/DEFIB  01/2001    Implant defibrillator, 06/2006 - pulse generator change serial # [id number]     C CABG, ARTERY-VEIN, FIVE       HC PPM GENERATOR REMOVAL  06/16/06    [facility]- removed Medtronic [id number] replaced with Medtronic Entrust#[id number]       Medications:  aspirin (ASA) 325 MG EC tablet, Take 1 tablet (325 mg) by mouth every 6 hours as needed for moderate pain  lisinopril-hydrochlorothiazide (ZESTORETIC) 20-25 MG tablet, TAKE TWO TABLETS BY MOUTH DAILY  metFORMIN (GLUCOPHAGE) 1000 MG tablet, TAKE ONE TABLET BY MOUTH TWICE A DAY WITH MEALS  MULTIVITAMIN TABS   OR, 1 tab daily  OMEGA-3 CAPS   OR, 1200 mg.--1 tab bid  VITAMIN C 500 MG OR TABS, 1 tab daily  acetaminophen  (TYLENOL) 325 MG tablet, Take 3 tablets (975 mg) by mouth every 8 hours as needed for other (mild pain) (Patient not taking: Reported on 3/17/2021)  CAYENNE 500 MG OR CAPS, 1 tab twice daily  hydrOXYzine (ATARAX) 25 MG tablet, Take 1 tablet (25 mg) by mouth every 6 hours as needed for itching or anxiety (with pain, moderate pain) (Patient not taking: Reported on 3/17/2021)  oxyCODONE (ROXICODONE) 5 MG tablet, Take 1-2 tablets (5-10 mg) by mouth every 3 hours as needed for pain (Moderate to Severe) (Patient not taking: Reported on 3/17/2021)  polyethylene glycol (MIRALAX) 17 g packet, Take 17 g by mouth daily (Patient not taking: Reported on 3/17/2021)  rivaroxaban ANTICOAGULANT (XARELTO) 10 MG TABS tablet, Take 1 tablet (10 mg) by mouth daily (Patient not taking: Reported on 2021)  senna-docusate (SENOKOT-S/PERICOLACE) 8.6-50 MG tablet, Take 1-2 tablets by mouth 2 times daily Take while on oral narcotics to prevent or treat constipation. (Patient not taking: Reported on 3/17/2021)  tiZANidine (ZANAFLEX) 2 MG tablet, Take 1-2 tablets (2-4 mg) by mouth every 6 hours as needed for muscle spasms (pain) (Patient not taking: Reported on 3/17/2021)    No current facility-administered medications on file prior to visit.       Allergies   Allergen Reactions     No Known Drug Allergies        Social History     Occupational History     Not on file   Tobacco Use     Smoking status: Former Smoker     Packs/day: 2.00     Years: 20.00     Pack years: 40.00     Types: Cigarettes     Quit date: 1999     Years since quittin.8     Smokeless tobacco: Former User   Substance and Sexual Activity     Alcohol use: Yes     Alcohol/week: 0.0 standard drinks     Comment: very little     Drug use: No     Sexual activity: Not Currently       No family history on file.    REVIEW OF SYSTEMS  General: negative for, night sweats, dizziness, fatigue  Resp: No shortness of breath and no cough  CV: negative for chest pain, syncope or  "near-syncope  GI: negative for nausea, vomiting and diarrhea  : negative for dysuria and hematuria  Musculoskeletal: as above  Neurologic: negative for syncope   Hematologic: negative for bleeding disorder    Physical Exam:  Vitals: BP (!) 166/78 (BP Location: Left arm, Patient Position: Sitting, Cuff Size: Adult Large)   Temp 97  F (36.1  C) (Temporal)   Ht 1.854 m (6' 1\")   Wt 124.5 kg (274 lb 8 oz)   BMI 36.22 kg/m    BMI= Body mass index is 36.22 kg/m .  Constitutional: healthy, alert and no acute distress   Psychiatric: mentation appears normal and affect normal/bright  NEURO: no focal deficits  SKIN: .well healed, no erythema, no incision breakdown and no drainage.  JOINT/EXTREMITIES: No swelling.  Able go from seated to standing position with no issues.  Near full motion.  GAIT: not tested     Diagnostic Modalities:  Previous imaging reviewed.  Independent visualization of the images was performed.      Impression:   Chief Complaint   Patient presents with     RECHECK     right hip follow up     Surgical Followup     DOS: 2/2/2021~Right total hip arthroplasty~ weeks postop   Doing well.  Making progress.    Plan:   We discussed return to work.  He is feels like he is getting there.  Is working with therapy.  We discussed returning to work April 5.  He certainly can call me if there is any issues.  Otherwise plan to see him back in 6 weeks.  Return to clinic 6, week(s), or sooner as needed for changes.    Re-x-ray on return: Yes.    Jeremy Luis D.O.  "

## 2021-03-23 NOTE — TELEPHONE ENCOUNTER
Not sure if HHC was every completed or faxed. Second request showed up today.     RN reviewed medications.     Forms given to PCP to sign.     DALJIT Avelar, RN  [facility]

## 2021-03-24 NOTE — TELEPHONE ENCOUNTER
Home Care nurse calling on status of Home Care Home Health Certification and Plan of Care. She is stating they have faxed over the form 3 times. Informed of documentation being made in the chart yesterday of form being received. Benita Spring LPN

## 2021-05-27 DIAGNOSIS — E11.9 TYPE 2 DIABETES MELLITUS WITHOUT COMPLICATION, WITHOUT LONG-TERM CURRENT USE OF INSULIN (H): ICD-10-CM

## 2021-06-28 ENCOUNTER — TELEPHONE (OUTPATIENT)
Dept: CARDIOLOGY | Facility: CLINIC | Age: 61
End: 2021-06-28

## 2021-06-28 NOTE — TELEPHONE ENCOUNTER
Called patient for [place] [employer], and there was no answer. Left a message to call clinic with any concerns about his BP.    Bre De León LPN

## 2021-07-01 DIAGNOSIS — I10 BENIGN ESSENTIAL HYPERTENSION: ICD-10-CM

## 2021-07-02 RX ORDER — LISINOPRIL AND HYDROCHLOROTHIAZIDE 20; 25 MG/1; MG/1
TABLET ORAL
Qty: 180 TABLET | Refills: 1 | Status: SHIPPED | OUTPATIENT
Start: 2021-07-02 | End: 2022-01-25

## 2021-07-02 NOTE — TELEPHONE ENCOUNTER
Routing refill request to provider for review/approval because:  Labs out of range:  Creatinine  Elevated BP on file.     Adelia Vieyra Rn

## 2021-07-30 DIAGNOSIS — E11.9 TYPE 2 DIABETES MELLITUS WITHOUT COMPLICATION, WITHOUT LONG-TERM CURRENT USE OF INSULIN (H): ICD-10-CM

## 2021-08-02 NOTE — TELEPHONE ENCOUNTER
"Requested Prescriptions   Pending Prescriptions Disp Refills    metFORMIN (GLUCOPHAGE) 1000 MG tablet [Pharmacy Med Name: METFORMIN HCL 1000MG TABS] 60 tablet 1     Sig: TAKE ONE TABLET BY MOUTH TWICE A DAY WITH MEALS        Biguanide Agents Failed - 7/30/2021  1:05 AM        Failed - Patient has documented A1c within the specified period of time.     If HgbA1C is 8 or greater, it needs to be on file within the past 3 months.  If less than 8, must be on file within the past 6 months.     Recent Labs   Lab Test 01/22/21  1100   A1C 7.5*             Failed - Recent (6 mo) or future (30 days) visit within the authorizing provider's specialty     Patient had office visit in the last 6 months or has a visit in the next 30 days with authorizing provider or within the authorizing provider's specialty.  See \"Patient Info\" tab in inbasket, or \"Choose Columns\" in Meds & Orders section of the refill encounter.            Passed - Patient is age 10 or older        Passed - Patient's CR is NOT>1.4 OR Patient's EGFR is NOT<45 within past 12 mos.       Recent Labs   Lab Test 01/22/21  1100   GFRESTIMATED >90   GFRESTBLACK >90       Recent Labs   Lab Test 01/22/21  1100   CR 0.62*             Passed - Patient does NOT have a diagnosis of CHF.        Passed - Medication is active on med list            Last Written Prescription Date:  5/28/2021  Last Fill Quantity: 60,  # refills: 1   Last office visit: 1/22/2021 with prescribing provider:  Mariela   Future Office Visit:          Routing refill request to provider for review/approval because:  Labs out of range:  A1C        Shiloh Velez RN  [facility]                 "

## 2021-08-06 DIAGNOSIS — E11.9 TYPE 2 DIABETES MELLITUS WITHOUT COMPLICATION, WITHOUT LONG-TERM CURRENT USE OF INSULIN (H): ICD-10-CM

## 2021-08-09 NOTE — TELEPHONE ENCOUNTER
"Requested Prescriptions   Pending Prescriptions Disp Refills    metFORMIN (GLUCOPHAGE) 1000 MG tablet [Pharmacy Med Name: METFORMIN HCL 1000MG TABS] 60 tablet 1     Sig: TAKE ONE TABLET BY MOUTH TWICE A DAY WITH MEALS        Biguanide Agents Failed - 8/6/2021  9:45 PM        Failed - Patient has documented A1c within the specified period of time.     If HgbA1C is 8 or greater, it needs to be on file within the past 3 months.  If less than 8, must be on file within the past 6 months.     Recent Labs   Lab Test 01/22/21  1100   A1C 7.5*             Failed - Recent (6 mo) or future (30 days) visit within the authorizing provider's specialty     Patient had office visit in the last 6 months or has a visit in the next 30 days with authorizing provider or within the authorizing provider's specialty.  See \"Patient Info\" tab in inbasket, or \"Choose Columns\" in Meds & Orders section of the refill encounter.            Passed - Patient is age 10 or older        Passed - Patient's CR is NOT>1.4 OR Patient's EGFR is NOT<45 within past 12 mos.       Recent Labs   Lab Test 01/22/21  1100   GFRESTIMATED >90   GFRESTBLACK >90       Recent Labs   Lab Test 01/22/21  1100   CR 0.62*             Passed - Patient does NOT have a diagnosis of CHF.        Passed - Medication is active on med list            Last Written Prescription Date:  8/3/2021  Last Fill Quantity: 60,  # refills: 1   Last office visit: 1/22/2021 with prescribing provider:  Matushin for pre-op   Future Office Visit:          Routing refill request to provider for review/approval because:  Labs out of range:  A1C  Patient needs to be seen because:  DM needs to be seen every 6 months?      Shiloh Velez RN  [facility]                 "

## 2021-09-05 DIAGNOSIS — E11.9 TYPE 2 DIABETES MELLITUS WITHOUT COMPLICATION, WITHOUT LONG-TERM CURRENT USE OF INSULIN (H): ICD-10-CM

## 2021-10-10 DIAGNOSIS — E11.9 TYPE 2 DIABETES MELLITUS WITHOUT COMPLICATION, WITHOUT LONG-TERM CURRENT USE OF INSULIN (H): ICD-10-CM

## 2021-10-12 NOTE — TELEPHONE ENCOUNTER
Routing refill request to provider for review/approval because:  Melissa given x1 and patient did not follow up, please advise  Patient needs to be seen because:  overdue for diabetic follow up    JESIKA AvelarN, RN  [facility]

## 2021-11-09 DIAGNOSIS — E11.9 TYPE 2 DIABETES MELLITUS WITHOUT COMPLICATION, WITHOUT LONG-TERM CURRENT USE OF INSULIN (H): ICD-10-CM

## 2021-11-10 NOTE — TELEPHONE ENCOUNTER
Pending Prescriptions:                       Disp   Refills    metFORMIN (GLUCOPHAGE) 1000 MG tablet [Pha*60 tab*0        Sig: TAKE ONE TABLET BY MOUTH TWICE A DAY WITH MEALS    Routing refill request to provider for review/approval because:  Labs not current:    Lab Results   Component Value Date    A1C 7.5 01/22/2021    A1C 7.5 08/03/2020    A1C 9.8 12/23/2019    A1C 7.6 12/17/2018    A1C 9.2 10/01/2018

## 2021-11-19 DIAGNOSIS — E11.9 TYPE 2 DIABETES MELLITUS WITHOUT COMPLICATION, WITHOUT LONG-TERM CURRENT USE OF INSULIN (H): ICD-10-CM

## 2021-12-18 DIAGNOSIS — E11.9 TYPE 2 DIABETES MELLITUS WITHOUT COMPLICATION, WITHOUT LONG-TERM CURRENT USE OF INSULIN (H): ICD-10-CM

## 2021-12-21 NOTE — TELEPHONE ENCOUNTER
Routing refill request to provider for review/approval because:  Labs not current:  A1c  Patient needs to be seen because:  Need 6 month diabetic visit      Johanne Rodas RN

## 2022-01-17 DIAGNOSIS — E11.9 TYPE 2 DIABETES MELLITUS WITHOUT COMPLICATION, WITHOUT LONG-TERM CURRENT USE OF INSULIN (H): ICD-10-CM

## 2022-01-19 NOTE — TELEPHONE ENCOUNTER
Pending Prescriptions:                       Disp   Refills    metFORMIN (GLUCOPHAGE) 1000 MG tablet [Pha*60 tab*0        Sig: TAKE ONE TABLET BY MOUTH TWICE A DAY WITH MEALS    Routing refill request to provider for review/approval because:  Biguanide Agents Failed 01/17/2022 01:07 AM   Protocol Details  Patient has documented A1c within the specified period of time.    Recent (6 mo) or future (30 days) visit within the authorizing provider's specialty     Hemoglobin A1C POCT   Date Value Ref Range Status   01/22/2021 7.5 (H) 0 - 5.6 % Final     Comment:     Normal <5.7% Prediabetes 5.7-6.4%  Diabetes 6.5% or higher - adopted from ADA   consensus guidelines.

## 2022-01-22 DIAGNOSIS — E11.9 TYPE 2 DIABETES MELLITUS WITHOUT COMPLICATION, WITHOUT LONG-TERM CURRENT USE OF INSULIN (H): ICD-10-CM

## 2022-01-22 DIAGNOSIS — I10 BENIGN ESSENTIAL HYPERTENSION: ICD-10-CM

## 2022-01-25 RX ORDER — LISINOPRIL AND HYDROCHLOROTHIAZIDE 20; 25 MG/1; MG/1
TABLET ORAL
Qty: 180 TABLET | Refills: 1 | Status: SHIPPED | OUTPATIENT
Start: 2022-01-25 | End: 2022-10-12

## 2022-01-25 NOTE — TELEPHONE ENCOUNTER
Pending Prescriptions:                       Disp   Refills    metFORMIN (GLUCOPHAGE) 1000 MG tablet [Pha*60 tab*0        Sig: TAKE ONE TABLET BY MOUTH TWICE A DAY WITH MEALS    lisinopril-hydrochlorothiazide (ZESTORETIC*180 ta*1        Sig: TAKE TWO TABLETS BY MOUTH EVERY DAY    Routing refill request to provider for review/approval because:  Labs out of range:    BP Readings from Last 3 Encounters:   03/17/21 (!) 166/78   02/17/21 124/70   02/03/21 (!) 162/86       Labs not current:    Creatinine   Date Value Ref Range Status   01/22/2021 0.62 (L) 0.66 - 1.25 mg/dL Final     Hemoglobin A1C POCT   Date Value Ref Range Status   01/22/2021 7.5 (H) 0 - 5.6 % Final     Comment:     Normal <5.7% Prediabetes 5.7-6.4%  Diabetes 6.5% or higher - adopted from ADA   consensus guidelines.             
Statement Selected

## 2022-02-17 NOTE — PATIENT INSTRUCTIONS

## 2022-02-25 DIAGNOSIS — E11.9 TYPE 2 DIABETES MELLITUS WITHOUT COMPLICATION, WITHOUT LONG-TERM CURRENT USE OF INSULIN (H): ICD-10-CM

## 2022-02-25 NOTE — TELEPHONE ENCOUNTER
Routing refill request to provider for review/approval because:  Patient needs to be seen because it has been more than 1 year since last office visit.    JESIKA AvelarN, RN  [facility]

## 2022-02-26 DIAGNOSIS — E11.9 TYPE 2 DIABETES MELLITUS WITHOUT COMPLICATION, WITHOUT LONG-TERM CURRENT USE OF INSULIN (H): ICD-10-CM

## 2022-03-24 DIAGNOSIS — E11.9 TYPE 2 DIABETES MELLITUS WITHOUT COMPLICATION, WITHOUT LONG-TERM CURRENT USE OF INSULIN (H): ICD-10-CM

## 2022-03-24 NOTE — TELEPHONE ENCOUNTER
Routing refill request to provider for review/approval because:  Labs not current:  A1C, CRE  Patient needs to be seen because:  6 month visit due      Johanne Rodas RN

## 2022-04-26 DIAGNOSIS — E11.9 TYPE 2 DIABETES MELLITUS WITHOUT COMPLICATION, WITHOUT LONG-TERM CURRENT USE OF INSULIN (H): ICD-10-CM

## 2022-04-27 NOTE — TELEPHONE ENCOUNTER
Routing refill request to provider for review/approval because:  Labs not current:  A1c, CRE/EGFR  Patient needs to be seen because:  6 month visit due      Johanne Rodas RN

## 2022-04-30 DIAGNOSIS — I10 BENIGN ESSENTIAL HYPERTENSION: ICD-10-CM

## 2022-05-02 RX ORDER — LISINOPRIL AND HYDROCHLOROTHIAZIDE 20; 25 MG/1; MG/1
TABLET ORAL
Qty: 180 TABLET | Refills: 1 | OUTPATIENT
Start: 2022-05-02

## 2022-05-02 NOTE — TELEPHONE ENCOUNTER
Zestoretic denied  Refill request too soon  Sent to this pharmacy on 1/25/2022 for 180 tablets and 1 refill    Alycia Rodas RN

## 2022-05-25 DIAGNOSIS — E11.9 TYPE 2 DIABETES MELLITUS WITHOUT COMPLICATION, WITHOUT LONG-TERM CURRENT USE OF INSULIN (H): ICD-10-CM

## 2022-07-19 DIAGNOSIS — E11.9 TYPE 2 DIABETES MELLITUS WITHOUT COMPLICATION, WITHOUT LONG-TERM CURRENT USE OF INSULIN (H): ICD-10-CM

## 2022-07-21 NOTE — TELEPHONE ENCOUNTER
Routing refill request to provider for review/approval because:  Labs not current:  A1c, CR/GFR  Patient needs to be seen because:  6 month visit    De Dillon RN

## 2022-09-02 ENCOUNTER — TELEPHONE (OUTPATIENT)
Dept: INTERNAL MEDICINE | Facility: CLINIC | Age: 62
End: 2022-09-02

## 2022-09-02 NOTE — TELEPHONE ENCOUNTER
Reason for Call: Request for an order or referral:    Order or referral being requested: LAB    Date needed: today    Has the patient been seen by the PCP for this problem? YES    Additional comments:   Additional comments: Patient is wanting blood labs for A1C, Covid Antibodies, lipids, and to be tested for lymes diease. Patient has made appointment     Phone number Patient can be reached at:  Cell number on file:    Telephone Information:   Mobile [phone]       Best Time:  anytime     Can we leave a detailed message on this number?  YES    Call taken on 9/2/2022 at 1:21 PM by Juliet Mcgee

## 2022-09-02 NOTE — TELEPHONE ENCOUNTER
Patient last seen by me in January 2021 for preoperative examination.    Patient previously on multiple medications.    Patient needs a in office visit/physical prior to any labs being ordered.    Patient can have lab work done without a visit but he will be billed directly.  If he chooses to order his own lab work, he should contact the laboratory to determine their policy.    Mariela

## 2022-09-02 NOTE — TELEPHONE ENCOUNTER
Reason for Call: Request for an order or referral:    Order or referral being requested: blood labs     Date needed: as soon as possible    Has the patient been seen by the PCP for this problem? Not Applicable    Additional comments: Patient is wanting blood labs for A1C, Covid Antibodies, lipids, and to be tested for lymes diease.    Phone number Patient can be reached at:  Home number on file [phone] (home) or Cell number on file:    Telephone Information:   Mobile [phone]       Best Time:  N/a    Can we leave a detailed message on this number?  YES    Call taken on 9/2/2022 at 8:50 AM by Noah Baltazar

## 2022-09-02 NOTE — TELEPHONE ENCOUNTER
This is a duplicate request there is still an open telephone encounter from this morning for these lab request. Closing this encounter. Provider was sent original message and waiting on reply.     Michelle Munoz MA

## 2022-09-02 NOTE — TELEPHONE ENCOUNTER
Routing to provider to sign if appropriate. Orders have been pended for the 4 patient requested.     I do see on HM that patient is also overdue for: Microalbumin, BMP, ALT, CBC.     Michelle Munoz MA

## 2022-10-11 ENCOUNTER — TELEPHONE (OUTPATIENT)
Dept: INTERNAL MEDICINE | Facility: CLINIC | Age: 62
End: 2022-10-11

## 2022-10-11 DIAGNOSIS — I10 BENIGN ESSENTIAL HYPERTENSION: ICD-10-CM

## 2022-10-11 NOTE — TELEPHONE ENCOUNTER
"Requested Prescriptions   Pending Prescriptions Disp Refills    lisinopril-hydrochlorothiazide (ZESTORETIC) 20-25 MG tablet 180 tablet 1     Sig: Take 2 tablets by mouth daily       Diuretics (Including Combos) Protocol Failed - 10/11/2022  5:16 PM        Failed - Blood pressure under 140/90 in past 12 months     BP Readings from Last 3 Encounters:   03/17/21 (!) 166/78   02/17/21 124/70   02/03/21 (!) 162/86                 Failed - Recent (12 mo) or future (30 days) visit within the authorizing provider's specialty     Patient has had an office visit with the authorizing provider or a provider within the authorizing providers department within the previous 12 mos or has a future within next 30 days. See \"Patient Info\" tab in inbasket, or \"Choose Columns\" in Meds & Orders section of the refill encounter.              Failed - Normal serum creatinine on file in past 12 months     Recent Labs   Lab Test 01/22/21  1100   CR 0.62*              Failed - Normal serum potassium on file in past 12 months     Recent Labs   Lab Test 01/22/21  1100   POTASSIUM 4.1                    Failed - Normal serum sodium on file in past 12 months     Recent Labs   Lab Test 01/22/21  1100                 Passed - Medication is active on med list        Passed - Patient is age 18 or older       ACE Inhibitors (Including Combos) Protocol Failed - 10/11/2022  5:16 PM        Failed - Blood pressure under 140/90 in past 12 months     BP Readings from Last 3 Encounters:   03/17/21 (!) 166/78   02/17/21 124/70   02/03/21 (!) 162/86                 Failed - Recent (12 mo) or future (30 days) visit within the authorizing provider's specialty     Patient has had an office visit with the authorizing provider or a provider within the authorizing providers department within the previous 12 mos or has a future within next 30 days. See \"Patient Info\" tab in inbasket, or \"Choose Columns\" in Meds & Orders section of the refill encounter.          "     Failed - Normal serum creatinine on file in past 12 months     Recent Labs   Lab Test 01/22/21  1100   CR 0.62*       Ok to refill medication if creatinine is low          Failed - Normal serum potassium on file in past 12 months     Recent Labs   Lab Test 01/22/21  1100   POTASSIUM 4.1             Passed - Medication is active on med list        Passed - Patient is age 18 or older             Alayna Bonilla, JESIKAN, RN

## 2022-10-11 NOTE — TELEPHONE ENCOUNTER
Patient requesting a work in next week for follow up on medications. OK to use same day slot on 10/21 AM?

## 2022-10-12 RX ORDER — LISINOPRIL AND HYDROCHLOROTHIAZIDE 20; 25 MG/1; MG/1
2 TABLET ORAL DAILY
Qty: 180 TABLET | Refills: 1 | Status: SHIPPED | OUTPATIENT
Start: 2022-10-12 | End: 2022-10-13

## 2022-10-13 ENCOUNTER — HOSPITAL ENCOUNTER (EMERGENCY)
Facility: CLINIC | Age: 62
Discharge: HOME | End: 2022-10-13
Attending: FAMILY MEDICINE | Admitting: FAMILY MEDICINE
Payer: COMMERCIAL

## 2022-10-13 ENCOUNTER — APPOINTMENT (OUTPATIENT)
Dept: GENERAL RADIOLOGY | Facility: CLINIC | Age: 62
End: 2022-10-13
Attending: FAMILY MEDICINE
Payer: COMMERCIAL

## 2022-10-13 ENCOUNTER — TELEPHONE (OUTPATIENT)
Dept: INTERNAL MEDICINE | Facility: CLINIC | Age: 62
End: 2022-10-13

## 2022-10-13 VITALS
WEIGHT: 274 LBS | RESPIRATION RATE: 27 BRPM | BODY MASS INDEX: 36.15 KG/M2 | DIASTOLIC BLOOD PRESSURE: 111 MMHG | OXYGEN SATURATION: 94 % | HEART RATE: 90 BPM | SYSTOLIC BLOOD PRESSURE: 168 MMHG | TEMPERATURE: 98.1 F

## 2022-10-13 DIAGNOSIS — I10 BENIGN ESSENTIAL HYPERTENSION: ICD-10-CM

## 2022-10-13 DIAGNOSIS — I50.9 ACUTE CONGESTIVE HEART FAILURE, UNSPECIFIED HEART FAILURE TYPE (H): ICD-10-CM

## 2022-10-13 LAB
ALBUMIN SERPL-MCNC: 3.3 G/DL (ref 3.4–5)
ALP SERPL-CCNC: 71 U/L (ref 40–150)
ALT SERPL W P-5'-P-CCNC: 14 U/L (ref 0–70)
ANION GAP SERPL CALCULATED.3IONS-SCNC: 7 MMOL/L (ref 3–14)
AST SERPL W P-5'-P-CCNC: 7 U/L (ref 0–45)
BASOPHILS # BLD AUTO: 0.1 10E3/UL (ref 0–0.2)
BASOPHILS NFR BLD AUTO: 1 %
BILIRUB SERPL-MCNC: 0.9 MG/DL (ref 0.2–1.3)
BUN SERPL-MCNC: 14 MG/DL (ref 7–30)
CALCIUM SERPL-MCNC: 8.9 MG/DL (ref 8.5–10.1)
CHLORIDE BLD-SCNC: 107 MMOL/L (ref 94–109)
CO2 SERPL-SCNC: 24 MMOL/L (ref 20–32)
CREAT SERPL-MCNC: 0.71 MG/DL (ref 0.66–1.25)
D DIMER PPP FEU-MCNC: 0.41 UG/ML FEU (ref 0–0.5)
EOSINOPHIL # BLD AUTO: 0.1 10E3/UL (ref 0–0.7)
EOSINOPHIL NFR BLD AUTO: 2 %
ERYTHROCYTE [DISTWIDTH] IN BLOOD BY AUTOMATED COUNT: 12.1 % (ref 10–15)
GFR SERPL CREATININE-BSD FRML MDRD: >90 ML/MIN/1.73M2
GLUCOSE BLD-MCNC: 208 MG/DL (ref 70–99)
HCT VFR BLD AUTO: 41.5 % (ref 40–53)
HGB BLD-MCNC: 14.6 G/DL (ref 13.3–17.7)
IMM GRANULOCYTES # BLD: 0 10E3/UL
IMM GRANULOCYTES NFR BLD: 0 %
LYMPHOCYTES # BLD AUTO: 1.3 10E3/UL (ref 0.8–5.3)
LYMPHOCYTES NFR BLD AUTO: 18 %
MAGNESIUM SERPL-MCNC: 1.9 MG/DL (ref 1.6–2.3)
MCH RBC QN AUTO: 32.3 PG (ref 26.5–33)
MCHC RBC AUTO-ENTMCNC: 35.2 G/DL (ref 31.5–36.5)
MCV RBC AUTO: 92 FL (ref 78–100)
MONOCYTES # BLD AUTO: 0.6 10E3/UL (ref 0–1.3)
MONOCYTES NFR BLD AUTO: 9 %
NEUTROPHILS # BLD AUTO: 5.1 10E3/UL (ref 1.6–8.3)
NEUTROPHILS NFR BLD AUTO: 70 %
NRBC # BLD AUTO: 0 10E3/UL
NRBC BLD AUTO-RTO: 0 /100
NT-PROBNP SERPL-MCNC: 1588 PG/ML (ref 0–900)
PLATELET # BLD AUTO: 224 10E3/UL (ref 150–450)
POTASSIUM BLD-SCNC: 3.9 MMOL/L (ref 3.4–5.3)
PROT SERPL-MCNC: 7.1 G/DL (ref 6.8–8.8)
RBC # BLD AUTO: 4.52 10E6/UL (ref 4.4–5.9)
SODIUM SERPL-SCNC: 138 MMOL/L (ref 133–144)
TROPONIN I SERPL HS-MCNC: 23 NG/L
WBC # BLD AUTO: 7.2 10E3/UL (ref 4–11)

## 2022-10-13 PROCEDURE — 83880 ASSAY OF NATRIURETIC PEPTIDE: CPT | Performed by: FAMILY MEDICINE

## 2022-10-13 PROCEDURE — 84484 ASSAY OF TROPONIN QUANT: CPT | Performed by: FAMILY MEDICINE

## 2022-10-13 PROCEDURE — 36415 COLL VENOUS BLD VENIPUNCTURE: CPT | Performed by: FAMILY MEDICINE

## 2022-10-13 PROCEDURE — 99284 EMERGENCY DEPT VISIT MOD MDM: CPT | Performed by: FAMILY MEDICINE

## 2022-10-13 PROCEDURE — 83735 ASSAY OF MAGNESIUM: CPT | Performed by: FAMILY MEDICINE

## 2022-10-13 PROCEDURE — 85004 AUTOMATED DIFF WBC COUNT: CPT | Performed by: FAMILY MEDICINE

## 2022-10-13 PROCEDURE — 85379 FIBRIN DEGRADATION QUANT: CPT | Performed by: FAMILY MEDICINE

## 2022-10-13 PROCEDURE — 99285 EMERGENCY DEPT VISIT HI MDM: CPT | Mod: 25

## 2022-10-13 PROCEDURE — 250N000011 HC RX IP 250 OP 636: Performed by: FAMILY MEDICINE

## 2022-10-13 PROCEDURE — 80053 COMPREHEN METABOLIC PANEL: CPT | Performed by: FAMILY MEDICINE

## 2022-10-13 PROCEDURE — 71045 X-RAY EXAM CHEST 1 VIEW: CPT

## 2022-10-13 PROCEDURE — 96374 THER/PROPH/DIAG INJ IV PUSH: CPT

## 2022-10-13 RX ORDER — FUROSEMIDE 10 MG/ML
60 INJECTION INTRAMUSCULAR; INTRAVENOUS ONCE
Status: COMPLETED | OUTPATIENT
Start: 2022-10-13 | End: 2022-10-13

## 2022-10-13 RX ORDER — LISINOPRIL AND HYDROCHLOROTHIAZIDE 20; 25 MG/1; MG/1
2 TABLET ORAL DAILY
Qty: 60 TABLET | Refills: 0 | Status: SHIPPED | OUTPATIENT
Start: 2022-10-13

## 2022-10-13 RX ADMIN — FUROSEMIDE 60 MG: 10 INJECTION, SOLUTION INTRAVENOUS at 10:38

## 2022-10-13 ASSESSMENT — ACTIVITIES OF DAILY LIVING (ADL): ADLS_ACUITY_SCORE: 35

## 2022-10-13 NOTE — TELEPHONE ENCOUNTER
Dr. Sierra patient needs to be worked into a ED follow up appointment wants to see only you.  Discharged from the DR for shortness of breath 10/13/2022.  Patient admits to going too long not taking his medications.    Please call patient at [phone]    Merlene SLOAN     [facility]

## 2022-10-13 NOTE — ED TRIAGE NOTES
"Patient states he has had worsening shortness of breath the last \"couple days\". He reports his doctor stopped filling his blood pressure med about 6 weeks ago and he thinks symptoms are related to that.     Triage Assessment     Row Name 10/13/22 0829       Triage Assessment (Adult)    Airway WDL WDL       Respiratory WDL    Respiratory WDL X;rhythm/pattern    Rhythm/Pattern, Respiratory dyspnea on exertion;shortness of breath;tachypneic              "

## 2022-10-13 NOTE — ED PROVIDER NOTES
History     Chief Complaint   Patient presents with     Shortness of Breath     HPI  Fredy Clark is a 62 year old male who presents with 1 week history of worsening shortness of breath.  Patient thinks it could be because he is out of his blood pressure and diabetes medications.  His doctor will not refill them and has been out of them for a couple of months.  Over the last week he has noticed his breathing is worse.  It is worse with activity.  There is no orthopnea.  Denies any chest pain or palpitations.  Denies any fevers or chills.  Has not had a cough.  Denies a sore throat or runny nose.  Patient has been eating and drinking normally.  He has noticed some swelling to his legs but they have gone down today.    Allergies:  Allergies   Allergen Reactions     No Known Drug Allergies        Problem List:    Patient Active Problem List    Diagnosis Date Noted     S/P total hip arthroplasty 02/02/2021     Priority: Medium     Atypical atrial flutter (H) 01/22/2021     Priority: Medium     CHF (congestive heart failure) (H) 01/22/2021     Priority: Medium     Primary osteoarthritis of right hip 02/15/2019     Priority: Medium     Obesity (BMI 35.0-39.9) with comorbidity (H) 12/17/2018     Priority: Medium     Benign essential hypertension 07/18/2016     Priority: Medium     Status post total hip replacement, left 01/05/2016     Priority: Medium     Obesity, Class II, BMI 35-39.9, with comorbidity 10/26/2015     Priority: Medium     Type 2 diabetes mellitus with hyperglycemia, without long-term current use of insulin (H) 10/31/2010     Priority: Medium     Hyperlipidemia LDL goal <100 10/31/2010     Priority: Medium     Cardiovascular disease 03/29/2006     Priority: Medium     Problem list name updated by automated process. Provider to review          Past Medical History:    Past Medical History:   Diagnosis Date     Coronary atherosclerosis of unspecified type of vessel, native or graft      Obesity, Class  II, BMI 35-39.9, with comorbidity 10/26/2015     Obesity, unspecified      Other and unspecified hyperlipidemia      Type II or unspecified type diabetes mellitus without mention of complication, not stated as uncontrolled      Unspecified essential hypertension        Past Surgical History:    Past Surgical History:   Procedure Laterality Date     ARTHROPLASTY HIP Left 2016    Procedure: ARTHROPLASTY HIP;  Surgeon: Dejon Luis DO;  Location: [facility] OR     ARTHROPLASTY HIP Right 2021    Procedure: Right total hip replacement;  Surgeon: Dejon Luis DO;  Location: [facility] OR     HC PPM GENERATOR REMOVAL  06    [facility]- removed Medtronic [id number] replaced with Medtronic Entrust#[id number]     [facility] ANESTH,CARDIOVERTER/DEFIB  2001    Implant defibrillator, 2006 - pulse generator change serial # [id number]     [facility] CABG, ARTERY-VEIN, FIVE         Family History:    No family history on file.    Social History:  Marital Status:  Single [1]  Social History     Tobacco Use     Smoking status: Former     Packs/day: 2.00     Years: 20.00     Pack years: 40.00     Types: Cigarettes     Quit date: 1999     Years since quittin.3     Smokeless tobacco: Former   Substance Use Topics     Alcohol use: Yes     Alcohol/week: 0.0 standard drinks     Comment: very little     Drug use: No        Medications:    lisinopril-hydrochlorothiazide (ZESTORETIC) 20-25 MG tablet  acetaminophen (TYLENOL) 325 MG tablet  aspirin (ASA) 325 MG EC tablet  CAYENNE 500 MG OR CAPS  hydrOXYzine (ATARAX) 25 MG tablet  metFORMIN (GLUCOPHAGE) 1000 MG tablet  MULTIVITAMIN TABS   OR  OMEGA-3 CAPS   OR  oxyCODONE (ROXICODONE) 5 MG tablet  polyethylene glycol (MIRALAX) 17 g packet  rivaroxaban ANTICOAGULANT (XARELTO) 10 MG TABS tablet  senna-docusate (SENOKOT-S/PERICOLACE) 8.6-50 MG tablet  tiZANidine (ZANAFLEX) 2 MG tablet  VITAMIN C 500 MG OR TABS          Review of Systems   All other systems reviewed and  are negative.      Physical Exam   BP: (!) 156/108  Pulse: 100  Temp: 98.1  F (36.7  C)  Resp: 26  Weight: 124.3 kg (274 lb)  SpO2: 96 %      Physical Exam  Vitals and nursing note reviewed.   Constitutional:       General: He is not in acute distress.     Appearance: He is well-developed and well-nourished. He is not diaphoretic.   HENT:      Head: Normocephalic and atraumatic.      Nose: Nose normal.      Mouth/Throat:      Mouth: Oropharynx is clear and moist.      Pharynx: No oropharyngeal exudate.   Eyes:      General: No scleral icterus.     Conjunctiva/sclera: Conjunctivae normal.      Pupils: Pupils are equal, round, and reactive to light.   Cardiovascular:      Rate and Rhythm: Normal rate and regular rhythm.      Pulses: Intact distal pulses.      Heart sounds: Normal heart sounds. No murmur heard.    No friction rub.   Pulmonary:      Effort: Pulmonary effort is normal. No respiratory distress.      Breath sounds: Normal breath sounds. No wheezing or rales.   Abdominal:      General: Bowel sounds are normal. There is no distension.      Palpations: Abdomen is soft. There is no mass.      Tenderness: There is no abdominal tenderness. There is no guarding or rebound.   Musculoskeletal:         General: No tenderness or edema. Normal range of motion.      Cervical back: Normal range of motion.   Skin:     General: Skin is warm.      Findings: No rash.   Neurological:      Mental Status: He is alert and oriented to person, place, and time.   Psychiatric:         Judgment: Judgment normal.         ED Course                 Procedures        Results for orders placed or performed during the hospital encounter of 10/13/22 (from the past 24 hour(s))   CBC with platelets differential    Narrative    The following orders were created for panel order CBC with platelets differential.  Procedure                               Abnormality         Status                     ---------                                -----------         ------                     CBC with platelets and d...[[id number]]                      Final result                 Please view results for these tests on the individual orders.   D dimer quantitative   Result Value Ref Range    D-Dimer Quantitative 0.41 0.00 - 0.50 ug/mL FEU    Narrative    This D-dimer assay is intended for use in conjunction with a clinical pretest probability assessment model to exclude pulmonary embolism (PE) and deep venous thrombosis (DVT) in outpatients suspected of PE or DVT. The cut-off value is 0.50 ug/mL FEU.   Comprehensive metabolic panel   Result Value Ref Range    Sodium 138 133 - 144 mmol/L    Potassium 3.9 3.4 - 5.3 mmol/L    Chloride 107 94 - 109 mmol/L    Carbon Dioxide (CO2) 24 20 - 32 mmol/L    Anion Gap 7 3 - 14 mmol/L    Urea Nitrogen 14 7 - 30 mg/dL    Creatinine 0.71 0.66 - 1.25 mg/dL    Calcium 8.9 8.5 - 10.1 mg/dL    Glucose 208 (H) 70 - 99 mg/dL    Alkaline Phosphatase 71 40 - 150 U/L    AST 7 0 - 45 U/L    ALT 14 0 - 70 U/L    Protein Total 7.1 6.8 - 8.8 g/dL    Albumin 3.3 (L) 3.4 - 5.0 g/dL    Bilirubin Total 0.9 0.2 - 1.3 mg/dL    GFR Estimate >90 >60 mL/min/1.73m2   Troponin I   Result Value Ref Range    Troponin I High Sensitivity 23 <79 ng/L   Magnesium   Result Value Ref Range    Magnesium 1.9 1.6 - 2.3 mg/dL   Nt probnp inpatient (BNP)   Result Value Ref Range    N terminal Pro BNP Inpatient 1,588 (H) 0 - 900 pg/mL   CBC with platelets and differential   Result Value Ref Range    WBC Count 7.2 4.0 - 11.0 10e3/uL    RBC Count 4.52 4.40 - 5.90 10e6/uL    Hemoglobin 14.6 13.3 - 17.7 g/dL    Hematocrit 41.5 40.0 - 53.0 %    MCV 92 78 - 100 fL    MCH 32.3 26.5 - 33.0 pg    MCHC 35.2 31.5 - 36.5 g/dL    RDW 12.1 10.0 - 15.0 %    Platelet Count 224 150 - 450 10e3/uL    % Neutrophils 70 %    % Lymphocytes 18 %    % Monocytes 9 %    % Eosinophils 2 %    % Basophils 1 %    % Immature Granulocytes 0 %    NRBCs per 100 WBC 0 <1 /100    Absolute  Neutrophils 5.1 1.6 - 8.3 10e3/uL    Absolute Lymphocytes 1.3 0.8 - 5.3 10e3/uL    Absolute Monocytes 0.6 0.0 - 1.3 10e3/uL    Absolute Eosinophils 0.1 0.0 - 0.7 10e3/uL    Absolute Basophils 0.1 0.0 - 0.2 10e3/uL    Absolute Immature Granulocytes 0.0 <=0.4 10e3/uL    Absolute NRBCs 0.0 10e3/uL   XR Chest Port 1 View    Narrative    CHEST ONE VIEW PORTABLE   10/13/2022 9:45 AM     HISTORY:  Shortness of breath.    COMPARISON: 3/14/2018.      Impression    IMPRESSION: Cardiac enlargement has increased, and mild pulmonary  venous congestion is new since the previous exam. Mild interstitial  prominence in both lower lungs suggests mild pulmonary edema. No other  significant interval change. Sternotomy. Dual-lead cardiac device left  chest wall, with lead tips projected over the RA and RV. No  pneumothorax.       Medications   furosemide (LASIX) injection 60 mg (has no administration in time range)     Labs were reviewed and patient's BNP did come back significantly elevated.  There is also signs of some fluid retention on the x-ray.  I think this is likely from being out of his blood pressure and diuretic medications.  We will give the patient a one-time dose of Lasix which should start to help and patient was given a refill to get back on his medications.  I want the patient to follow-up in the next week to make sure that his breathing is improving from getting back on his medications and he may need further work-up for his heart failure.    Assessments & Plan (with Medical Decision Making)  CHF exacerbation     I have reviewed the nursing notes.    I have reviewed the findings, diagnosis, plan and need for follow up with the patient.      New Prescriptions    No medications on file       Final diagnoses:   Acute congestive heart failure, unspecified heart failure type (H)       10/13/2022   [facility] EMERGENCY DEPT     Micky Vazquez MD  10/13/22 9046

## 2022-10-13 NOTE — TELEPHONE ENCOUNTER
Please place the patient on my schedule in any available 20 minute time slot that is not restricted.    If a timely appointment is not available, please refer the patient to one of the many excellent [place] providers who might have an opening.    Thank you.    Mariela Rodas

## 2022-10-20 NOTE — TELEPHONE ENCOUNTER
2nd attempt, called and LM for patient to call back. Please relay note below and help schedule.   Michelle Munoz MA

## 2023-02-14 DIAGNOSIS — E11.9 TYPE 2 DIABETES MELLITUS WITHOUT COMPLICATION, WITHOUT LONG-TERM CURRENT USE OF INSULIN (H): ICD-10-CM

## 2023-02-14 NOTE — TELEPHONE ENCOUNTER
"Requested Prescriptions   Pending Prescriptions Disp Refills    metFORMIN (GLUCOPHAGE) 1000 MG tablet 60 tablet 3     Sig: Take 1 tablet (1,000 mg) by mouth 2 times daily (with meals)       Biguanide Agents Failed - 2/14/2023  9:16 AM        Failed - Patient has documented A1c within the specified period of time.     If HgbA1C is 8 or greater, it needs to be on file within the past 3 months.  If less than 8, must be on file within the past 6 months.     Recent Labs   Lab Test 01/22/21  1100   A1C 7.5*             Failed - Recent (6 mo) or future (30 days) visit within the authorizing provider's specialty     Patient had office visit in the last 6 months or has a visit in the next 30 days with authorizing provider or within the authorizing provider's specialty.  See \"Patient Info\" tab in inbasket, or \"Choose Columns\" in Meds & Orders section of the refill encounter.            Passed - Patient is age 10 or older        Passed - Patient's CR is NOT>1.4 OR Patient's EGFR is NOT<45 within past 12 mos.     Recent Labs   Lab Test 10/13/22  0920 01/22/21  1100   GFRESTIMATED >90 >90   GFRESTBLACK  --  >90       Recent Labs   Lab Test 10/13/22  0920   CR 0.71             Passed - Patient does NOT have a diagnosis of CHF.        Passed - Medication is active on med list             "

## 2023-08-02 ENCOUNTER — TELEPHONE (OUTPATIENT)
Dept: INTERNAL MEDICINE | Facility: CLINIC | Age: 63
End: 2023-08-02
Payer: COMMERCIAL

## 2023-08-02 NOTE — TELEPHONE ENCOUNTER
Reason for Call:  Form, our goal is to have forms completed with 72 hours, however, some forms may require a visit or additional information.    Type of letter, form or note:  Home Health Certification    Who is the form from?: Home care    Where did the form come from: form was faxed in    What clinic location was the form placed at?: [facility]    Where the form was placed: Given to EUEGNIA Mays    What number is listed as a contact on the form?: [phone]       Additional comments: Knute Nelson    Call taken on 8/2/2023 at 3:53 PM by Michelle López

## 2023-08-04 NOTE — TELEPHONE ENCOUNTER
Medication reconciliation completed by RN. Form and chart forwarded to PCP for signatures and review of medication not listed with clinic.    Alycia Rodas RN

## 2023-08-08 DIAGNOSIS — Z53.9 DIAGNOSIS NOT YET DEFINED: Primary | ICD-10-CM

## 2023-08-08 PROCEDURE — G0180 MD CERTIFICATION HHA PATIENT: HCPCS | Performed by: INTERNAL MEDICINE

## (undated) DEVICE — ADH SKIN CLOSURE PREMIERPRO EXOFIN 1.0ML 3470

## (undated) DEVICE — DRAPE POUCH INSTRUMENT 1018

## (undated) DEVICE — DRSG GAUZE 4X4" TRAY

## (undated) DEVICE — SU VICRYL 0 OS-6 18" UND J754T

## (undated) DEVICE — HOOD FLYTE 0408-800-000

## (undated) DEVICE — ESU PENCIL W/SMOKE EVAC

## (undated) DEVICE — NDL COUNTER 40CT

## (undated) DEVICE — SOL NACL 0.9% IRRIG 3000ML BAG 07972-08

## (undated) DEVICE — SUCTION IRR SYSTEM W/O TIP INTERPULSE HANDPIECE 0210-100-000

## (undated) DEVICE — BLADE SAW SAGITTAL STRK 25X90X1.19MM HD SYS 6 6125-119-090

## (undated) DEVICE — DRAPE SHEET REV FOLD 3/4 9349

## (undated) DEVICE — ESU GROUND PAD UNIVERSAL W/O CORD

## (undated) DEVICE — DRSG AQUACEL AG 3.5X9.75" HYDROFIBER 412011

## (undated) DEVICE — DRAPE U SPLIT 74X120" 29440

## (undated) DEVICE — DRAPE CONVERTORS U-DRAPE 60X72" 8476

## (undated) DEVICE — SU VICRYL 2-0 CP-2 18" UND J762D

## (undated) DEVICE — SUCTION TIP YANKAUER ORTHO SUPER SUCKER EFS-111

## (undated) DEVICE — DRAPE STERI TOWEL SM 1000

## (undated) DEVICE — BNDG COBAN 6"X5YDS STERILE

## (undated) DEVICE — CAST PADDING 6" COTTON WEBRIL UNSTERILE 9086

## (undated) DEVICE — SU ETHIBOND 2 V-37 4X30" MX69G

## (undated) DEVICE — ESU ELEC BLADE 6" COATED E1450-6

## (undated) DEVICE — GLOVE ESTEEM BLUE W/NEU-THERA 7.5  2D73PB75

## (undated) DEVICE — BONE CLEANING TIP INTERPULSE  0210-010-000

## (undated) DEVICE — GLOVE ESTEEM BLUE W/NEU-THERA 8.0  2D73PB80

## (undated) DEVICE — TAPE MEDIPORE 4"X10YD 2964

## (undated) DEVICE — PACK TOTAL JOINT STD LATEX

## (undated) DEVICE — GLOVE PROTEXIS W/NEU-THERA 7.0  2D73TE70

## (undated) DEVICE — GLOVE PROTEXIS W/NEU-THERA 7.5  2D73TE75

## (undated) DEVICE — CATH TRAY FOLEY 16FR DRAINAGE BAG STATLOCK 899916

## (undated) DEVICE — PREP CHLORAPREP 26ML TINTED ORANGE  260815

## (undated) RX ORDER — PROPOFOL 10 MG/ML
INJECTION, EMULSION INTRAVENOUS
Status: DISPENSED
Start: 2021-02-02

## (undated) RX ORDER — DEXAMETHASONE SODIUM PHOSPHATE 10 MG/ML
INJECTION, SOLUTION INTRAMUSCULAR; INTRAVENOUS
Status: DISPENSED
Start: 2021-02-02

## (undated) RX ORDER — LIDOCAINE HYDROCHLORIDE 20 MG/ML
INJECTION, SOLUTION EPIDURAL; INFILTRATION; INTRACAUDAL; PERINEURAL
Status: DISPENSED
Start: 2021-02-02

## (undated) RX ORDER — FENTANYL CITRATE 50 UG/ML
INJECTION, SOLUTION INTRAMUSCULAR; INTRAVENOUS
Status: DISPENSED
Start: 2021-02-02

## (undated) RX ORDER — GLYCOPYRROLATE 0.2 MG/ML
INJECTION INTRAMUSCULAR; INTRAVENOUS
Status: DISPENSED
Start: 2021-02-02